# Patient Record
Sex: FEMALE | Race: BLACK OR AFRICAN AMERICAN | NOT HISPANIC OR LATINO | Employment: UNEMPLOYED | URBAN - METROPOLITAN AREA
[De-identification: names, ages, dates, MRNs, and addresses within clinical notes are randomized per-mention and may not be internally consistent; named-entity substitution may affect disease eponyms.]

---

## 2023-01-18 PROBLEM — H26.492 PCO (POSTERIOR CAPSULAR OPACIFICATION), LEFT: Status: ACTIVE | Noted: 2023-01-18

## 2023-01-18 PROBLEM — Z96.1 PSEUDOPHAKIA, RIGHT EYE: Status: ACTIVE | Noted: 2023-01-18

## 2023-01-18 PROBLEM — M79.641 PAIN IN BOTH HANDS: Status: ACTIVE | Noted: 2023-01-18

## 2023-01-18 PROBLEM — M19.90 ARTHRITIS: Status: ACTIVE | Noted: 2023-01-18

## 2023-01-18 PROBLEM — H26.9 CATARACT OF RIGHT EYE: Status: ACTIVE | Noted: 2023-01-18

## 2023-01-18 PROBLEM — K21.9 ESOPHAGEAL REFLUX: Status: ACTIVE | Noted: 2023-01-18

## 2023-01-18 PROBLEM — H25.811 COMBINED FORM OF AGE-RELATED CATARACT, RIGHT EYE: Status: ACTIVE | Noted: 2023-01-18

## 2023-01-18 PROBLEM — H26.40 AFTER CATARACT, LEFT EYE: Status: ACTIVE | Noted: 2023-01-18

## 2023-01-18 PROBLEM — R45.89 LONELINESS: Status: ACTIVE | Noted: 2023-01-18

## 2023-01-18 PROBLEM — Z74.09 DECREASED AMBULATION STATUS: Status: ACTIVE | Noted: 2023-01-18

## 2023-01-18 PROBLEM — G56.03 CARPAL TUNNEL SYNDROME, BILATERAL: Status: ACTIVE | Noted: 2023-01-18

## 2023-01-18 PROBLEM — Z78.0 ASYMPTOMATIC MENOPAUSE: Status: RESOLVED | Noted: 2023-01-18 | Resolved: 2023-01-18

## 2023-01-18 PROBLEM — N18.31 CKD STAGE G3A/A1, GFR 45-59 AND ALBUMIN CREATININE RATIO <30 MG/G (MULTI): Status: ACTIVE | Noted: 2023-01-18

## 2023-01-18 PROBLEM — G62.9 NEUROPATHY: Status: ACTIVE | Noted: 2023-01-18

## 2023-01-18 PROBLEM — E03.9 HYPOTHYROIDISM: Status: ACTIVE | Noted: 2023-01-18

## 2023-01-18 PROBLEM — R16.0 HEPATOMEGALY: Status: ACTIVE | Noted: 2023-01-18

## 2023-01-18 PROBLEM — R05.3 PERSISTENT COUGH: Status: ACTIVE | Noted: 2023-01-18

## 2023-01-18 PROBLEM — H52.4 MYOPIA WITH PRESBYOPIA: Status: ACTIVE | Noted: 2023-01-18

## 2023-01-18 PROBLEM — M79.642 PAIN IN BOTH HANDS: Status: ACTIVE | Noted: 2023-01-18

## 2023-01-18 PROBLEM — M79.606 LEG PAIN: Status: ACTIVE | Noted: 2023-01-18

## 2023-01-18 PROBLEM — Z72.0 CURRENT TOBACCO USE: Status: ACTIVE | Noted: 2023-01-18

## 2023-01-18 PROBLEM — R79.89 ELEVATED PARATHYROID HORMONE: Status: ACTIVE | Noted: 2023-01-18

## 2023-01-18 PROBLEM — I10 BENIGN ESSENTIAL HYPERTENSION: Status: ACTIVE | Noted: 2023-01-18

## 2023-01-18 PROBLEM — M54.17 LUMBOSACRAL NEURITIS: Status: ACTIVE | Noted: 2023-01-18

## 2023-01-18 PROBLEM — M54.9 BACK PAIN: Status: ACTIVE | Noted: 2023-01-18

## 2023-01-18 PROBLEM — H91.93 DECREASED HEARING OF BOTH EARS: Status: ACTIVE | Noted: 2023-01-18

## 2023-01-18 PROBLEM — M25.519 SHOULDER PAIN: Status: ACTIVE | Noted: 2023-01-18

## 2023-01-18 PROBLEM — H93.19 TINNITUS: Status: ACTIVE | Noted: 2023-01-18

## 2023-01-18 PROBLEM — Z96.1 PSEUDOPHAKIA, LEFT EYE: Status: ACTIVE | Noted: 2023-01-18

## 2023-01-18 PROBLEM — J31.0 CHRONIC RHINITIS: Status: ACTIVE | Noted: 2023-01-18

## 2023-01-18 PROBLEM — E06.3 HASHIMOTO'S THYROIDITIS: Status: ACTIVE | Noted: 2023-01-18

## 2023-01-18 PROBLEM — Z78.0 ASYMPTOMATIC MENOPAUSE: Status: ACTIVE | Noted: 2023-01-18

## 2023-01-18 PROBLEM — H52.209 ASTIGMATISM: Status: ACTIVE | Noted: 2023-01-18

## 2023-01-18 PROBLEM — M54.14 THORACIC NEURITIS: Status: ACTIVE | Noted: 2023-01-18

## 2023-01-18 PROBLEM — Z72.0 CURRENT TOBACCO USE: Status: RESOLVED | Noted: 2023-01-18 | Resolved: 2023-01-18

## 2023-01-18 PROBLEM — R45.89 LONELINESS: Status: RESOLVED | Noted: 2023-01-18 | Resolved: 2023-01-18

## 2023-01-18 PROBLEM — Z74.09 DECREASED AMBULATION STATUS: Status: RESOLVED | Noted: 2023-01-18 | Resolved: 2023-01-18

## 2023-01-18 PROBLEM — K52.9 GASTROENTERITIS: Status: ACTIVE | Noted: 2023-01-18

## 2023-01-18 PROBLEM — H52.10 MYOPIA WITH PRESBYOPIA: Status: ACTIVE | Noted: 2023-01-18

## 2023-01-18 PROBLEM — M17.9 OSTEOARTHRITIS OF KNEE: Status: ACTIVE | Noted: 2023-01-18

## 2023-01-18 PROBLEM — R79.89 RENAL AZOTEMIA: Status: ACTIVE | Noted: 2023-01-18

## 2023-01-18 PROBLEM — E55.9 VITAMIN D DEFICIENCY: Status: ACTIVE | Noted: 2023-01-18

## 2023-01-18 PROBLEM — E87.6 HYPOKALEMIA: Status: ACTIVE | Noted: 2023-01-18

## 2023-01-18 PROBLEM — M79.646 FINGER PAIN: Status: ACTIVE | Noted: 2023-01-18

## 2023-01-18 PROBLEM — Z91.81 AT MODERATE RISK FOR FALL: Status: RESOLVED | Noted: 2023-01-18 | Resolved: 2023-01-18

## 2023-01-18 PROBLEM — Z91.81 AT MODERATE RISK FOR FALL: Status: ACTIVE | Noted: 2023-01-18

## 2023-01-18 PROBLEM — H40.053 OCULAR HYPERTENSION, BILATERAL: Status: ACTIVE | Noted: 2023-01-18

## 2023-01-18 PROBLEM — K59.00 CONSTIPATION: Status: ACTIVE | Noted: 2023-01-18

## 2023-01-18 PROBLEM — M77.9 TENDONITIS: Status: ACTIVE | Noted: 2023-01-18

## 2023-01-18 PROBLEM — Z98.42 HISTORY OF YAG LASER CAPSULOTOMY OF LENS OF LEFT EYE: Status: ACTIVE | Noted: 2023-01-18

## 2023-01-18 PROBLEM — H91.90 HEARING LOSS: Status: ACTIVE | Noted: 2023-01-18

## 2023-01-18 PROBLEM — E78.00 HYPERCHOLESTEROLEMIA: Status: ACTIVE | Noted: 2023-01-18

## 2023-01-18 PROBLEM — D36.9 TUBULAR ADENOMA: Status: ACTIVE | Noted: 2023-01-18

## 2023-01-18 RX ORDER — LEVOTHYROXINE SODIUM 125 UG/1
TABLET ORAL DAILY
COMMUNITY
End: 2023-06-28 | Stop reason: SDUPTHER

## 2023-01-18 RX ORDER — FLUTICASONE PROPIONATE 50 MCG
1 SPRAY, SUSPENSION (ML) NASAL DAILY
COMMUNITY

## 2023-01-18 RX ORDER — ACETAMINOPHEN 325 MG/1
325-650 TABLET ORAL EVERY 4 HOURS PRN
COMMUNITY

## 2023-01-18 RX ORDER — AMLODIPINE BESYLATE 10 MG/1
10 TABLET ORAL DAILY
COMMUNITY
End: 2023-04-28 | Stop reason: SDUPTHER

## 2023-01-18 RX ORDER — MECLIZINE HYDROCHLORIDE 25 MG/1
25 TABLET ORAL 3 TIMES DAILY
COMMUNITY
End: 2024-02-28

## 2023-01-18 RX ORDER — BACLOFEN 10 MG/1
10 TABLET ORAL 3 TIMES DAILY PRN
COMMUNITY

## 2023-01-18 RX ORDER — HYDROCHLOROTHIAZIDE 25 MG/1
25 TABLET ORAL DAILY
COMMUNITY
End: 2023-04-28 | Stop reason: SDUPTHER

## 2023-01-18 RX ORDER — ACETAMINOPHEN 500 MG
1 TABLET ORAL DAILY
COMMUNITY

## 2023-01-18 RX ORDER — AMOXICILLIN 250 MG
1 CAPSULE ORAL DAILY PRN
COMMUNITY

## 2023-02-17 PROBLEM — E21.0 HYPERPARATHYROIDISM, PRIMARY (MULTI): Status: ACTIVE | Noted: 2023-02-17

## 2023-03-06 ENCOUNTER — OFFICE VISIT (OUTPATIENT)
Dept: PRIMARY CARE | Facility: CLINIC | Age: 89
End: 2023-03-06
Payer: MEDICARE

## 2023-03-06 DIAGNOSIS — Z00.00 ROUTINE GENERAL MEDICAL EXAMINATION AT HEALTH CARE FACILITY: ICD-10-CM

## 2023-03-06 DIAGNOSIS — E03.9 HYPOTHYROIDISM, UNSPECIFIED TYPE: ICD-10-CM

## 2023-03-06 DIAGNOSIS — E55.9 VITAMIN D DEFICIENCY: ICD-10-CM

## 2023-03-06 DIAGNOSIS — E21.0 HYPERPARATHYROIDISM, PRIMARY (MULTI): ICD-10-CM

## 2023-03-06 DIAGNOSIS — N18.31 CKD STAGE G3A/A1, GFR 45-59 AND ALBUMIN CREATININE RATIO <30 MG/G (MULTI): ICD-10-CM

## 2023-03-06 DIAGNOSIS — I10 BENIGN ESSENTIAL HYPERTENSION: Primary | ICD-10-CM

## 2023-03-06 DIAGNOSIS — Z00.00 MEDICARE ANNUAL WELLNESS VISIT, SUBSEQUENT: ICD-10-CM

## 2023-03-06 PROCEDURE — 4004F PT TOBACCO SCREEN RCVD TLK: CPT | Performed by: STUDENT IN AN ORGANIZED HEALTH CARE EDUCATION/TRAINING PROGRAM

## 2023-03-06 PROCEDURE — 1160F RVW MEDS BY RX/DR IN RCRD: CPT | Performed by: STUDENT IN AN ORGANIZED HEALTH CARE EDUCATION/TRAINING PROGRAM

## 2023-03-06 PROCEDURE — 3075F SYST BP GE 130 - 139MM HG: CPT | Performed by: STUDENT IN AN ORGANIZED HEALTH CARE EDUCATION/TRAINING PROGRAM

## 2023-03-06 PROCEDURE — G0439 PPPS, SUBSEQ VISIT: HCPCS | Performed by: STUDENT IN AN ORGANIZED HEALTH CARE EDUCATION/TRAINING PROGRAM

## 2023-03-06 PROCEDURE — 99214 OFFICE O/P EST MOD 30 MIN: CPT | Performed by: STUDENT IN AN ORGANIZED HEALTH CARE EDUCATION/TRAINING PROGRAM

## 2023-03-06 PROCEDURE — 1159F MED LIST DOCD IN RCRD: CPT | Performed by: STUDENT IN AN ORGANIZED HEALTH CARE EDUCATION/TRAINING PROGRAM

## 2023-03-06 PROCEDURE — 3078F DIAST BP <80 MM HG: CPT | Performed by: STUDENT IN AN ORGANIZED HEALTH CARE EDUCATION/TRAINING PROGRAM

## 2023-03-06 PROCEDURE — 1170F FXNL STATUS ASSESSED: CPT | Performed by: STUDENT IN AN ORGANIZED HEALTH CARE EDUCATION/TRAINING PROGRAM

## 2023-03-06 NOTE — PROGRESS NOTES
Subjective   Patient ID: Елена Perez is a 88 y.o. female who presents for Annual Exam (MCW. /77).    HPI     Review of Systems    Objective   Pulse 102   Wt 81.4 kg (179 lb 7.3 oz)   SpO2 95%   BMI 28.96 kg/m²     Physical Exam    Assessment/Plan

## 2023-03-07 VITALS
DIASTOLIC BLOOD PRESSURE: 77 MMHG | HEIGHT: 66 IN | WEIGHT: 179.45 LBS | OXYGEN SATURATION: 95 % | BODY MASS INDEX: 28.84 KG/M2 | SYSTOLIC BLOOD PRESSURE: 133 MMHG | HEART RATE: 102 BPM

## 2023-03-07 ASSESSMENT — PATIENT HEALTH QUESTIONNAIRE - PHQ9
2. FEELING DOWN, DEPRESSED OR HOPELESS: NOT AT ALL
SUM OF ALL RESPONSES TO PHQ9 QUESTIONS 1 AND 2: 0
1. LITTLE INTEREST OR PLEASURE IN DOING THINGS: NOT AT ALL

## 2023-03-07 ASSESSMENT — ACTIVITIES OF DAILY LIVING (ADL)
BATHING: INDEPENDENT
TOILETING: INDEPENDENT
FEEDING YOURSELF: INDEPENDENT
GROOMING: INDEPENDENT
HEARING - RIGHT EAR: HEARING AID
WALKS IN HOME: INDEPENDENT
HEARING - LEFT EAR: HEARING AID
PATIENT'S MEMORY ADEQUATE TO SAFELY COMPLETE DAILY ACTIVITIES?: YES
DRESSING YOURSELF: INDEPENDENT
ADEQUATE_TO_COMPLETE_ADL: YES

## 2023-03-07 NOTE — PROGRESS NOTES
Subjective   Reason for Visit: Елена Perez is an 88 y.o. female here for a Medicare Wellness visit.     Past Medical, Surgical, and Family History reviewed and updated in chart.    Reviewed all medications by prescribing practitioner or clinical pharmacist (such as prescriptions, OTCs, herbal therapies and supplements) and documented in the medical record.    HPI  87 y/o female with HTN, hypothyroidism ( ESt with endo ) , hyperparathyroidism  She is here with her son , Mr. Monteiro     No acute concerns today , but both son and pt were upset abt the MMSE and Mas personality .     Elevated PTH and vit D insuff noted in Dec 2022   Hypothyroidism : Est with endo   HTN  : at goal                         Patient Care Team:  Natty Gabriel MD as PCP - General     Review of Systems  Constitutional : No feeling poorly / fevers/ chills / night sweats/ fatigue   Cardiovascular : No CP /Palpitations/ lower extremity edema / syncope   Respiratory : No Cough /LUZ/Dyspnea at rest   Gastrointestinal : No abd pain / N/V  No bloody stools/ melena / constipation  Endo : No polyuria/polydipsia/ muscle weakness / sluggishness   CNS: No confusion / HA/ tingling/ numbness/ weakness of extremities  Psychiatric: No anxiety/ depression/ SI/HI    All other systems have been reviewed and are negative for complaint   Objective   Vitals:  Pulse 102   Wt 81.4 kg (179 lb 7.3 oz)   SpO2 95%   BMI 28.96 kg/m²       Physical Exam  Constitutional : Vitals reviewed. Alert and in no distress  Cardiovascular : RRR, Normal S1, S2, No pericardial rub/ gallop, no peripheral edema   Pulmonary: No respiratory distress, CTAB   MSK : Normal gait and station , strength and tone   Skin: Normal skin color and pigmentation, normal skin turgor and no rash   Neurologic : CNs 2-12 grossly intact , no obvious FNDs  Psych : A,Ox3, normal mood and affect    Assessment/Plan   Problem List Items Addressed This Visit    None  87 y/o female with HTN,  hypothyroidism ( ESt with endo ) , hyperparathyroidism  She is here with her son , Mr. Monteiro     Chronic medical conditions: Reviewed , assessed , stable.  - HTN : at goal   - Hyperparathyroidism :Rpt PTH, BMP, Vit D   Age appropriate labs / labs for mgmt of chronic medical conditions ordered, further mgmt pending the results.      Risk factors identified during visit :   None    Immunizations :  Influenza : Given today or previously   Prevnar 20 : Given today or previously   Pneumovax 23: Given today or previously    Shingles: recommended to receive at the pharmacy    Cancer screenings:   Mammogram :   Screening not indicated  Cervical cancer:  Screening not indicated  Colon cancer:     Screening  not indicated  Lung cancer :     Screening not indicated  HIV screening:   Screening not indicated  Osteoporosis :   Screening not indicated        RTO  in 6m or sooner if needed

## 2023-03-09 ASSESSMENT — ACTIVITIES OF DAILY LIVING (ADL)
DRESSING: INDEPENDENT
MANAGING_FINANCES: INDEPENDENT
BATHING: INDEPENDENT
GROCERY_SHOPPING: NEEDS ASSISTANCE
DOING_HOUSEWORK: NEEDS ASSISTANCE
TAKING_MEDICATION: INDEPENDENT

## 2023-03-09 ASSESSMENT — PATIENT HEALTH QUESTIONNAIRE - PHQ9
SUM OF ALL RESPONSES TO PHQ9 QUESTIONS 1 AND 2: 0
2. FEELING DOWN, DEPRESSED OR HOPELESS: NOT AT ALL
1. LITTLE INTEREST OR PLEASURE IN DOING THINGS: NOT AT ALL

## 2023-04-26 ENCOUNTER — TELEPHONE (OUTPATIENT)
Dept: PRIMARY CARE | Facility: CLINIC | Age: 89
End: 2023-04-26
Payer: COMMERCIAL

## 2023-04-26 NOTE — PROGRESS NOTES
"Patient Experience from recent annual visit with Dr. Gabriel on March 6, 2023.    Q 1. Easy get appt? ANSWER: yes  Q 2. Wait long to get appt? ANSWER: not long  Q 3. Referrals Easy? ANSWER: yes  Q 4. Dr know result of referral? ANSWER: probably yes  Q 5. Review Rxs? ANSWER: yes  Q 6. Dr review labs, xrays, etc.?  ANSWER: yes  Q 7. Insurance cover Rx? ANSWER: yes  Q 8. Dr ask about Balance? ANSWER: yes  Q 9.  Ask about bladder control? ANSWER: yes  Q 10.  Ask about exercise level? ANSWER: yes, she covered everything with me.    Other Comments:  \"very good doctor and like her very much.\"  "

## 2023-04-28 DIAGNOSIS — I10 BENIGN ESSENTIAL HYPERTENSION: Primary | ICD-10-CM

## 2023-05-01 RX ORDER — HYDROCHLOROTHIAZIDE 25 MG/1
25 TABLET ORAL DAILY
Qty: 90 TABLET | Refills: 1 | Status: SHIPPED | OUTPATIENT
Start: 2023-05-01 | End: 2023-11-17 | Stop reason: SDUPTHER

## 2023-05-01 RX ORDER — AMLODIPINE BESYLATE 10 MG/1
10 TABLET ORAL DAILY
Qty: 90 TABLET | Refills: 1 | Status: SHIPPED | OUTPATIENT
Start: 2023-05-01 | End: 2023-11-17 | Stop reason: SDUPTHER

## 2023-06-28 DIAGNOSIS — E03.9 HYPOTHYROIDISM, UNSPECIFIED TYPE: ICD-10-CM

## 2023-06-28 RX ORDER — LEVOTHYROXINE SODIUM 125 UG/1
TABLET ORAL
Qty: 52 TABLET | Refills: 3 | Status: SHIPPED | OUTPATIENT
Start: 2023-06-28

## 2023-09-07 ENCOUNTER — APPOINTMENT (OUTPATIENT)
Dept: PRIMARY CARE | Facility: CLINIC | Age: 89
End: 2023-09-07
Payer: COMMERCIAL

## 2023-09-08 ENCOUNTER — OFFICE VISIT (OUTPATIENT)
Dept: PRIMARY CARE | Facility: CLINIC | Age: 89
End: 2023-09-08
Payer: COMMERCIAL

## 2023-09-08 ENCOUNTER — LAB (OUTPATIENT)
Dept: LAB | Facility: LAB | Age: 89
End: 2023-09-08
Payer: COMMERCIAL

## 2023-09-08 VITALS
SYSTOLIC BLOOD PRESSURE: 131 MMHG | WEIGHT: 175.8 LBS | OXYGEN SATURATION: 96 % | HEART RATE: 93 BPM | HEIGHT: 66 IN | DIASTOLIC BLOOD PRESSURE: 70 MMHG | BODY MASS INDEX: 28.25 KG/M2

## 2023-09-08 DIAGNOSIS — H53.8 BLURRY VISION, LEFT EYE: ICD-10-CM

## 2023-09-08 DIAGNOSIS — Z23 NEED FOR INFLUENZA VACCINATION: ICD-10-CM

## 2023-09-08 DIAGNOSIS — E78.2 MIXED HYPERLIPIDEMIA: ICD-10-CM

## 2023-09-08 DIAGNOSIS — E21.0 HYPERPARATHYROIDISM, PRIMARY (MULTI): ICD-10-CM

## 2023-09-08 DIAGNOSIS — E03.9 HYPOTHYROIDISM, UNSPECIFIED TYPE: Primary | ICD-10-CM

## 2023-09-08 DIAGNOSIS — E03.9 HYPOTHYROIDISM, UNSPECIFIED TYPE: ICD-10-CM

## 2023-09-08 LAB
ANION GAP IN SER/PLAS: 13 MMOL/L (ref 10–20)
CALCIDIOL (25 OH VITAMIN D3) (NG/ML) IN SER/PLAS: 26 NG/ML
CALCIUM (MG/DL) IN SER/PLAS: 9.8 MG/DL (ref 8.6–10.6)
CARBON DIOXIDE, TOTAL (MMOL/L) IN SER/PLAS: 27 MMOL/L (ref 21–32)
CHLORIDE (MMOL/L) IN SER/PLAS: 104 MMOL/L (ref 98–107)
CHOLESTEROL (MG/DL) IN SER/PLAS: 203 MG/DL (ref 0–199)
CHOLESTEROL IN HDL (MG/DL) IN SER/PLAS: 35 MG/DL
CHOLESTEROL/HDL RATIO: 5.8
CREATININE (MG/DL) IN SER/PLAS: 1.08 MG/DL (ref 0.5–1.05)
GFR FEMALE: 49 ML/MIN/1.73M2
GLUCOSE (MG/DL) IN SER/PLAS: 113 MG/DL (ref 74–99)
LDL: 138 MG/DL (ref 0–99)
POTASSIUM (MMOL/L) IN SER/PLAS: 3.9 MMOL/L (ref 3.5–5.3)
SODIUM (MMOL/L) IN SER/PLAS: 140 MMOL/L (ref 136–145)
THYROTROPIN (MIU/L) IN SER/PLAS BY DETECTION LIMIT <= 0.05 MIU/L: 6.41 MIU/L (ref 0.44–3.98)
THYROXINE (T4) FREE (NG/DL) IN SER/PLAS: 1.14 NG/DL (ref 0.78–1.48)
TRIGLYCERIDE (MG/DL) IN SER/PLAS: 149 MG/DL (ref 0–149)
UREA NITROGEN (MG/DL) IN SER/PLAS: 18 MG/DL (ref 6–23)
VLDL: 30 MG/DL (ref 0–40)

## 2023-09-08 PROCEDURE — 82306 VITAMIN D 25 HYDROXY: CPT

## 2023-09-08 PROCEDURE — 1160F RVW MEDS BY RX/DR IN RCRD: CPT | Performed by: STUDENT IN AN ORGANIZED HEALTH CARE EDUCATION/TRAINING PROGRAM

## 2023-09-08 PROCEDURE — 3075F SYST BP GE 130 - 139MM HG: CPT | Performed by: STUDENT IN AN ORGANIZED HEALTH CARE EDUCATION/TRAINING PROGRAM

## 2023-09-08 PROCEDURE — 83970 ASSAY OF PARATHORMONE: CPT

## 2023-09-08 PROCEDURE — 84439 ASSAY OF FREE THYROXINE: CPT

## 2023-09-08 PROCEDURE — 84443 ASSAY THYROID STIM HORMONE: CPT

## 2023-09-08 PROCEDURE — 1159F MED LIST DOCD IN RCRD: CPT | Performed by: STUDENT IN AN ORGANIZED HEALTH CARE EDUCATION/TRAINING PROGRAM

## 2023-09-08 PROCEDURE — G0008 ADMIN INFLUENZA VIRUS VAC: HCPCS | Performed by: STUDENT IN AN ORGANIZED HEALTH CARE EDUCATION/TRAINING PROGRAM

## 2023-09-08 PROCEDURE — 99214 OFFICE O/P EST MOD 30 MIN: CPT | Performed by: STUDENT IN AN ORGANIZED HEALTH CARE EDUCATION/TRAINING PROGRAM

## 2023-09-08 PROCEDURE — 80061 LIPID PANEL: CPT

## 2023-09-08 PROCEDURE — 4004F PT TOBACCO SCREEN RCVD TLK: CPT | Performed by: STUDENT IN AN ORGANIZED HEALTH CARE EDUCATION/TRAINING PROGRAM

## 2023-09-08 PROCEDURE — 3078F DIAST BP <80 MM HG: CPT | Performed by: STUDENT IN AN ORGANIZED HEALTH CARE EDUCATION/TRAINING PROGRAM

## 2023-09-08 PROCEDURE — 90662 IIV NO PRSV INCREASED AG IM: CPT | Performed by: STUDENT IN AN ORGANIZED HEALTH CARE EDUCATION/TRAINING PROGRAM

## 2023-09-08 PROCEDURE — 80048 BASIC METABOLIC PNL TOTAL CA: CPT

## 2023-09-08 PROCEDURE — 36415 COLL VENOUS BLD VENIPUNCTURE: CPT

## 2023-09-08 NOTE — PROGRESS NOTES
Subjective   Patient ID: Елена Perez is a 88 y.o. female who presents for Follow-up (6 month follow-up. ).        HPI    89 y/o female with HTN, hypothyroidism ( ESt with endo ) , hyperparathyroidism   She is here with her son      No LMP recorded.     Review of Systems    Constitutional : No feeling poorly / fevers/ chills / night sweats/ fatigue   Cardiovascular : No CP /Palpitations/ lower extremity edema / syncope   Respiratory : No Cough /LUZ/Dyspnea at rest   Gastrointestinal : No abd pain / N/V  No bloody stools/ melena / constipation    CNS: No confusion / HA/ tingling/ numbness/ weakness of extremities  Psychiatric: No anxiety/ depression/ SI/HI    All other systems have been reviewed and are negative for complaint       Physical Exam    Constitutional : Vitals reviewed. Alert and in no distress  Cardiovascular : RRR, Normal S1, S2, No pericardial rub/ gallop, no peripheral edema   Pulmonary: No respiratory distress, CTAB     Neurologic : CNs 2-12 grossly intact , no obvious FNDs  Psych : A,Ox3, normal mood and affect      Assessment/Plan   Diagnoses and all orders for this visit:  Hypothyroidism, unspecified type  -     TSH with reflex to Free T4 if abnormal; Future  Need for influenza vaccination  -     Flu vaccine, quadrivalent, high-dose, preservative free, age 65y+ (FLUZONE)  Mixed hyperlipidemia  -     Lipid Panel; Future  Blurry vision, left eye  -     Referral to Ophthalmology; Future       89 y/o female with HTN, hypothyroidism ( ESt with endo ) , hyperparathyroidism  She is here with her son , Mr. Monteiro      Chronic medical conditions: Reviewed , assessed , stable.  - HTN : at goal   - Hyperparathyroidism :Rpt PTH, BMP, Vit D  to be done today  Age appropriate labs / labs for mgmt of chronic medical conditions ordered, further mgmt pending the results.    - HPL : Declined statin , dietary changes recommended     RTO  in 6m for MCW

## 2023-09-09 LAB — PARATHYRIN INTACT (PG/ML) IN SER/PLAS: 89.3 PG/ML (ref 18.5–88)

## 2023-11-17 DIAGNOSIS — I10 BENIGN ESSENTIAL HYPERTENSION: ICD-10-CM

## 2023-11-20 RX ORDER — AMLODIPINE BESYLATE 10 MG/1
10 TABLET ORAL DAILY
Qty: 90 TABLET | Refills: 1 | Status: SHIPPED | OUTPATIENT
Start: 2023-11-20 | End: 2024-05-17 | Stop reason: SDUPTHER

## 2023-11-20 RX ORDER — HYDROCHLOROTHIAZIDE 25 MG/1
25 TABLET ORAL DAILY
Qty: 90 TABLET | Refills: 1 | Status: SHIPPED | OUTPATIENT
Start: 2023-11-20 | End: 2024-05-17 | Stop reason: SDUPTHER

## 2024-02-28 ENCOUNTER — HOSPITAL ENCOUNTER (OUTPATIENT)
Dept: RADIOLOGY | Facility: CLINIC | Age: 89
Discharge: HOME | End: 2024-02-28
Payer: COMMERCIAL

## 2024-02-28 ENCOUNTER — OFFICE VISIT (OUTPATIENT)
Dept: PRIMARY CARE | Facility: CLINIC | Age: 89
End: 2024-02-28
Payer: COMMERCIAL

## 2024-02-28 VITALS
HEART RATE: 96 BPM | OXYGEN SATURATION: 94 % | DIASTOLIC BLOOD PRESSURE: 70 MMHG | SYSTOLIC BLOOD PRESSURE: 110 MMHG | BODY MASS INDEX: 28.48 KG/M2 | HEIGHT: 66 IN | WEIGHT: 177.2 LBS

## 2024-02-28 DIAGNOSIS — M47.892 OTHER OSTEOARTHRITIS OF SPINE, CERVICAL REGION: Primary | ICD-10-CM

## 2024-02-28 DIAGNOSIS — M47.892 OTHER OSTEOARTHRITIS OF SPINE, CERVICAL REGION: ICD-10-CM

## 2024-02-28 DIAGNOSIS — N18.31 CKD STAGE G3A/A1, GFR 45-59 AND ALBUMIN CREATININE RATIO <30 MG/G (MULTI): ICD-10-CM

## 2024-02-28 DIAGNOSIS — E21.0 HYPERPARATHYROIDISM, PRIMARY (MULTI): ICD-10-CM

## 2024-02-28 PROCEDURE — 72040 X-RAY EXAM NECK SPINE 2-3 VW: CPT

## 2024-02-28 PROCEDURE — 1159F MED LIST DOCD IN RCRD: CPT | Performed by: STUDENT IN AN ORGANIZED HEALTH CARE EDUCATION/TRAINING PROGRAM

## 2024-02-28 PROCEDURE — 3078F DIAST BP <80 MM HG: CPT | Performed by: STUDENT IN AN ORGANIZED HEALTH CARE EDUCATION/TRAINING PROGRAM

## 2024-02-28 PROCEDURE — 3074F SYST BP LT 130 MM HG: CPT | Performed by: STUDENT IN AN ORGANIZED HEALTH CARE EDUCATION/TRAINING PROGRAM

## 2024-02-28 PROCEDURE — 99214 OFFICE O/P EST MOD 30 MIN: CPT | Performed by: STUDENT IN AN ORGANIZED HEALTH CARE EDUCATION/TRAINING PROGRAM

## 2024-02-28 PROCEDURE — 1160F RVW MEDS BY RX/DR IN RCRD: CPT | Performed by: STUDENT IN AN ORGANIZED HEALTH CARE EDUCATION/TRAINING PROGRAM

## 2024-02-28 NOTE — PROGRESS NOTES
"Subjective   Patient ID: Елена Perez is a 89 y.o. female who presents for Follow-up ( follow-up neck sprain. ).        HPI    Neck muscle spasm and pain after trying to lift a chair     UC visit thereafter , was given prednisone and muscle relaxer.  \"Improved greatly \"   Visit Vitals  /70   Pulse 96   Ht 1.676 m (5' 6\")   Wt 80.4 kg (177 lb 3.2 oz)   SpO2 94%   BMI 28.60 kg/m²   Smoking Status Every Day   BSA 1.93 m²      No LMP recorded.     Review of Systems    Constitutional : No feeling poorly / fevers/ chills / night sweats/ fatigue   Cardiovascular : No CP /Palpitations/ lower extremity edema / syncope   Respiratory : No Cough /LUZ/Dyspnea at rest   Gastrointestinal : No abd pain / N/V  No bloody stools/ melena / constipation  Endo : No polyuria/polydipsia/ muscle weakness / sluggishness   CNS: No confusion / HA/ tingling/ numbness/ weakness of extremities  Psychiatric: No anxiety/ depression/ SI/HI    All other systems have been reviewed and are negative for complaint       Physical Exam    Constitutional : Vitals reviewed. Alert and in no distress  Cardiovascular : RRR, Normal S1, S2, No pericardial rub/ gallop, no peripheral edema   Pulmonary: No respiratory distress, CTAB   MSK : Normal gait and station , strength and tone   TTP at the left lateral side of the neck    Neurologic : CNs 2-12 grossly intact , no obvious FNDs  Psych : A,Ox3, normal mood and affect      Assessment/Plan   Diagnoses and all orders for this visit:  Other osteoarthritis of spine, cervical region  -     XR cervical spine 2-3 views; Future  Hyperparathyroidism, primary (CMS/HCC)  CKD stage G3a/A1, GFR 45-59 and albumin creatinine ratio <30 mg/g (CMS/HCC)        Possible neck muscle spasm after moving heavy recliner  Will get Xray C spine to r/o fracture given known DJD C spine and her age   Try Voltaren gel , salon patches for pain relief     Conditions addressed and mgmt as noted above.  Pertinent labs, images/ imaging " reports , chart review was done .

## 2024-05-17 DIAGNOSIS — I10 BENIGN ESSENTIAL HYPERTENSION: ICD-10-CM

## 2024-05-20 RX ORDER — HYDROCHLOROTHIAZIDE 25 MG/1
25 TABLET ORAL DAILY
Qty: 30 TABLET | Refills: 0 | Status: SHIPPED | OUTPATIENT
Start: 2024-05-20

## 2024-05-20 RX ORDER — AMLODIPINE BESYLATE 10 MG/1
10 TABLET ORAL DAILY
Qty: 30 TABLET | Refills: 0 | Status: SHIPPED | OUTPATIENT
Start: 2024-05-20

## 2024-07-09 DIAGNOSIS — E03.9 HYPOTHYROIDISM, UNSPECIFIED TYPE: ICD-10-CM

## 2024-07-09 DIAGNOSIS — I10 BENIGN ESSENTIAL HYPERTENSION: ICD-10-CM

## 2024-07-09 RX ORDER — LEVOTHYROXINE SODIUM 125 UG/1
TABLET ORAL
Qty: 52 TABLET | Refills: 3 | Status: SHIPPED | OUTPATIENT
Start: 2024-07-09

## 2024-07-09 RX ORDER — HYDROCHLOROTHIAZIDE 25 MG/1
25 TABLET ORAL DAILY
Qty: 90 TABLET | Refills: 1 | Status: SHIPPED | OUTPATIENT
Start: 2024-07-09

## 2024-07-09 RX ORDER — AMLODIPINE BESYLATE 10 MG/1
10 TABLET ORAL DAILY
Qty: 90 TABLET | Refills: 1 | Status: SHIPPED | OUTPATIENT
Start: 2024-07-09

## 2024-07-23 ENCOUNTER — APPOINTMENT (OUTPATIENT)
Dept: PRIMARY CARE | Facility: CLINIC | Age: 89
End: 2024-07-23
Payer: COMMERCIAL

## 2024-07-23 VITALS
HEIGHT: 66 IN | HEART RATE: 87 BPM | BODY MASS INDEX: 28.12 KG/M2 | OXYGEN SATURATION: 96 % | WEIGHT: 175 LBS | DIASTOLIC BLOOD PRESSURE: 75 MMHG | SYSTOLIC BLOOD PRESSURE: 126 MMHG

## 2024-07-23 DIAGNOSIS — M47.816 OSTEOARTHRITIS OF LUMBAR SPINE, UNSPECIFIED SPINAL OSTEOARTHRITIS COMPLICATION STATUS: ICD-10-CM

## 2024-07-23 DIAGNOSIS — H53.9 VISION CHANGES: ICD-10-CM

## 2024-07-23 DIAGNOSIS — E03.9 ACQUIRED HYPOTHYROIDISM: ICD-10-CM

## 2024-07-23 DIAGNOSIS — Z00.00 MEDICARE ANNUAL WELLNESS VISIT, SUBSEQUENT: Primary | ICD-10-CM

## 2024-07-23 DIAGNOSIS — E55.9 VITAMIN D DEFICIENCY: ICD-10-CM

## 2024-07-23 DIAGNOSIS — E21.0 HYPERPARATHYROIDISM, PRIMARY (MULTI): ICD-10-CM

## 2024-07-23 DIAGNOSIS — N18.31 CKD STAGE G3A/A1, GFR 45-59 AND ALBUMIN CREATININE RATIO <30 MG/G (MULTI): ICD-10-CM

## 2024-07-23 PROCEDURE — 1170F FXNL STATUS ASSESSED: CPT | Performed by: STUDENT IN AN ORGANIZED HEALTH CARE EDUCATION/TRAINING PROGRAM

## 2024-07-23 PROCEDURE — 4004F PT TOBACCO SCREEN RCVD TLK: CPT | Performed by: STUDENT IN AN ORGANIZED HEALTH CARE EDUCATION/TRAINING PROGRAM

## 2024-07-23 PROCEDURE — G0439 PPPS, SUBSEQ VISIT: HCPCS | Performed by: STUDENT IN AN ORGANIZED HEALTH CARE EDUCATION/TRAINING PROGRAM

## 2024-07-23 PROCEDURE — 99214 OFFICE O/P EST MOD 30 MIN: CPT | Performed by: STUDENT IN AN ORGANIZED HEALTH CARE EDUCATION/TRAINING PROGRAM

## 2024-07-23 PROCEDURE — 1124F ACP DISCUSS-NO DSCNMKR DOCD: CPT | Performed by: STUDENT IN AN ORGANIZED HEALTH CARE EDUCATION/TRAINING PROGRAM

## 2024-07-23 PROCEDURE — 1160F RVW MEDS BY RX/DR IN RCRD: CPT | Performed by: STUDENT IN AN ORGANIZED HEALTH CARE EDUCATION/TRAINING PROGRAM

## 2024-07-23 PROCEDURE — 3074F SYST BP LT 130 MM HG: CPT | Performed by: STUDENT IN AN ORGANIZED HEALTH CARE EDUCATION/TRAINING PROGRAM

## 2024-07-23 PROCEDURE — 1159F MED LIST DOCD IN RCRD: CPT | Performed by: STUDENT IN AN ORGANIZED HEALTH CARE EDUCATION/TRAINING PROGRAM

## 2024-07-23 PROCEDURE — 3078F DIAST BP <80 MM HG: CPT | Performed by: STUDENT IN AN ORGANIZED HEALTH CARE EDUCATION/TRAINING PROGRAM

## 2024-07-23 RX ORDER — CYCLOBENZAPRINE HCL 5 MG
5 TABLET ORAL NIGHTLY PRN
Qty: 30 TABLET | Refills: 2 | Status: SHIPPED | OUTPATIENT
Start: 2024-07-23 | End: 2024-09-21

## 2024-07-23 RX ORDER — CYCLOBENZAPRINE HCL 5 MG
TABLET ORAL
COMMUNITY
Start: 2024-02-20

## 2024-07-23 ASSESSMENT — ACTIVITIES OF DAILY LIVING (ADL)
DRESSING: INDEPENDENT
BATHING: INDEPENDENT
TAKING_MEDICATION: INDEPENDENT
GROCERY_SHOPPING: TOTAL CARE
DOING_HOUSEWORK: NEEDS ASSISTANCE
MANAGING_FINANCES: INDEPENDENT

## 2024-07-23 ASSESSMENT — PATIENT HEALTH QUESTIONNAIRE - PHQ9
2. FEELING DOWN, DEPRESSED OR HOPELESS: NOT AT ALL
1. LITTLE INTEREST OR PLEASURE IN DOING THINGS: NOT AT ALL
SUM OF ALL RESPONSES TO PHQ9 QUESTIONS 1 AND 2: 0

## 2024-07-23 NOTE — PROGRESS NOTES
Subjective   Reason for Visit: Елена Perez is an 89 y.o. female here for a Medicare Wellness visit.     Past Medical, Surgical, and Family History reviewed and updated in chart.    Reviewed all medications by prescribing practitioner or clinical pharmacist (such as prescriptions, OTCs, herbal therapies and supplements) and documented in the medical record.    HPI  89 y/o female with HTN, hypothyroidism ( ESt with endo ) , hyperparathyroidism   She is here with her son                Patient Care Team:  Natty Gabriel MD as PCP - General (Family Medicine)     Current Outpatient Medications   Medication Instructions    acetaminophen (TYLENOL) 325-650 mg, oral, Every 4 hours PRN    amLODIPine (NORVASC) 10 mg, oral, Daily    cholecalciferol (Vitamin D-3) 5,000 Units tablet 1 tablet, oral, Daily    cyclobenzaprine (Flexeril) 5 mg tablet TAKE 1 TABLET BY MOUTH EVERY 8 HOURS FOR 10 DAYS AS NEEDED    cyclobenzaprine (FLEXERIL) 5 mg, oral, Nightly PRN    fluticasone (Flonase) 50 mcg/actuation nasal spray 1 spray, Each Nostril, Daily, Shake gently. Before first use, prime pump. After use, clean tip and replace cap.    hydroCHLOROthiazide (HYDRODIURIL) 25 mg, oral, Daily    levothyroxine (Synthroid, Levoxyl) 125 mcg tablet Take 0.5 tablet by mouth every day x6 days, and 1 full tablet on day7    sennosides-docusate sodium (Lindsey-Colace) 8.6-50 mg tablet 1 tablet, oral, Daily PRN        Review of Systems  Constitutional: no chills, no fever and no night sweats.     Eyes: no blurred vision and no eyesight problems.     ENT: no hearing loss, no nasal congestion, no nasal discharge, no hoarseness and no sore throat.     Cardiovascular: no chest pain, no intermittent leg claudication, no lower extremity edema, no palpitations and no syncope.     Respiratory: no cough, no shortness of breath during exertion, no shortness of breath at rest and no wheezing.     Gastrointestinal: no abdominal pain, no blood in stools, no  "constipation, no diarrhea, no melena, no nausea, no rectal pain and no vomiting.     Genitourinary: no dysuria, no change in urinary frequency, no urinary hesitancy, no feelings of urinary urgency and no vaginal discharge.     Musculoskeletal: no arthralgias, no back pain and no myalgias.     Integumentary: no new skin lesions and no rashes.     Neurological: no difficulty walking, no headache, no limb weakness, no numbness and no tingling.     Psychiatric: no anxiety, no depression, no anhedonia and no substance use disorders.     Endocrine: no recent weight gain and no recent weight loss.     Hematologic/Lymphatic: no tendency for easy bruising and no swollen glands.          All other systems have been reviewed and are negative for complaint.    Objective   Vitals:  /75   Pulse 87   Ht 1.676 m (5' 6\")   Wt 79.4 kg (175 lb)   SpO2 96%   BMI 28.25 kg/m²       Physical Exam    Constitutional: Alert and in no acute distress. Well developed, well nourished.     Eyes: Normal external exam. Pupils were equal in size, round, reactive to light (PERRL) with normal accommodation and extraocular movements intact (EOMI).     Ears, Nose, Mouth, and Throat: External inspection of ears and nose: Normal.  Otoscopic examination: Normal.      Neck: No neck mass was observed. Supple.     Cardiovascular: Heart rate and rhythm were normal, normal S1 and S2, no gallops, no murmurs and no pericardial rub    Pulmonary: No respiratory distress. Clear bilateral breath sounds.     Abdomen: Soft nontender; no abdominal mass palpated. No organomegaly.     Musculoskeletal: No joint swelling seen, normal movements of all extremities. Range of motion: Normal.  Muscle strength/tone: Normal.        Neurologic: Deep tendon reflexes were 2+ and symmetric. Sensation: Normal.     Psychiatric: Judgment and insight: Intact. Mood and affect: Normal.    Assessment/Plan   Problem List Items Addressed This Visit       CKD stage G3a/A1, GFR 45-59 " and albumin creatinine ratio <30 mg/g (Multi)    Relevant Orders    CBC    Comprehensive Metabolic Panel    Hypothyroidism    Relevant Orders    TSH with reflex to Free T4 if abnormal    Lipid Panel    Vitamin D deficiency    Relevant Orders    Vitamin D 25-Hydroxy,Total (for eval of Vitamin D levels)    Hyperparathyroidism, primary (Multi)    Relevant Orders    Parathyroid Hormone, Intact     Other Visit Diagnoses       Medicare annual wellness visit, subsequent    -  Primary    Relevant Orders    Hemoglobin A1C    Vision changes        Relevant Orders    Referral to Ophthalmology    Osteoarthritis of lumbar spine, unspecified spinal osteoarthritis complication status        Relevant Medications    cyclobenzaprine (Flexeril) 5 mg tablet          89 y/o female with HTN, hypothyroidism ( ESt with endo ) , hyperparathyroidism  She is here with her son , Mr. Monteiro      Chronic medical conditions: Reviewed , assessed , stable.  - HTN : at goal   - Hyperparathyroidism :Rpt PTH, BMP, Vit D   Age appropriate labs / labs for mgmt of chronic medical conditions ordered, further mgmt pending the results.    - hypothyroidism : due for labs   - Morning stiffness frrom DJD : Flexeril prn   Drowsiness as SE discussed      MMSE : 26/30 today  Immunizations :  Influenza :  in the fall   Prevnar 20 : Given . previously   Pneumovax 23: Given .previously    Shingles: recommended to receive at the pharmacy, pt declined      Cancer screenings:   Mammogram :   Screening not indicated  Cervical cancer:  Screening not indicated  Colon cancer:     Screening  not indicated  Lung cancer :     Screening not indicated  HIV screening:   Screening not indicated  Osteoporosis :   Screening not indicated           RTO  in 6m or sooner if needed

## 2024-07-24 ENCOUNTER — LAB (OUTPATIENT)
Dept: LAB | Facility: LAB | Age: 89
End: 2024-07-24
Payer: COMMERCIAL

## 2024-07-24 DIAGNOSIS — Z00.00 MEDICARE ANNUAL WELLNESS VISIT, SUBSEQUENT: ICD-10-CM

## 2024-07-24 DIAGNOSIS — E03.9 ACQUIRED HYPOTHYROIDISM: ICD-10-CM

## 2024-07-24 DIAGNOSIS — E21.0 HYPERPARATHYROIDISM, PRIMARY (MULTI): ICD-10-CM

## 2024-07-24 DIAGNOSIS — N18.31 CKD STAGE G3A/A1, GFR 45-59 AND ALBUMIN CREATININE RATIO <30 MG/G (MULTI): ICD-10-CM

## 2024-07-24 DIAGNOSIS — E55.9 VITAMIN D DEFICIENCY: ICD-10-CM

## 2024-07-24 LAB
25(OH)D3 SERPL-MCNC: 29 NG/ML (ref 30–100)
ALBUMIN SERPL BCP-MCNC: 4.1 G/DL (ref 3.4–5)
ALP SERPL-CCNC: 69 U/L (ref 33–136)
ALT SERPL W P-5'-P-CCNC: 9 U/L (ref 7–45)
ANION GAP SERPL CALC-SCNC: 13 MMOL/L (ref 10–20)
AST SERPL W P-5'-P-CCNC: 11 U/L (ref 9–39)
BILIRUB SERPL-MCNC: 0.5 MG/DL (ref 0–1.2)
BUN SERPL-MCNC: 15 MG/DL (ref 6–23)
CALCIUM SERPL-MCNC: 9.8 MG/DL (ref 8.6–10.6)
CHLORIDE SERPL-SCNC: 98 MMOL/L (ref 98–107)
CHOLEST SERPL-MCNC: 206 MG/DL (ref 0–199)
CHOLESTEROL/HDL RATIO: 5.2
CO2 SERPL-SCNC: 27 MMOL/L (ref 21–32)
CREAT SERPL-MCNC: 0.96 MG/DL (ref 0.5–1.05)
EGFRCR SERPLBLD CKD-EPI 2021: 57 ML/MIN/1.73M*2
ERYTHROCYTE [DISTWIDTH] IN BLOOD BY AUTOMATED COUNT: 17.1 % (ref 11.5–14.5)
EST. AVERAGE GLUCOSE BLD GHB EST-MCNC: 146 MG/DL
GLUCOSE SERPL-MCNC: 114 MG/DL (ref 74–99)
HBA1C MFR BLD: 6.7 %
HCT VFR BLD AUTO: 63.2 % (ref 36–46)
HDLC SERPL-MCNC: 39.4 MG/DL
HGB BLD-MCNC: 20.7 G/DL (ref 12–16)
LDLC SERPL CALC-MCNC: 144 MG/DL
MCH RBC QN AUTO: 28.2 PG (ref 26–34)
MCHC RBC AUTO-ENTMCNC: 32.8 G/DL (ref 32–36)
MCV RBC AUTO: 86 FL (ref 80–100)
NON HDL CHOLESTEROL: 167 MG/DL (ref 0–149)
NRBC BLD-RTO: 0 /100 WBCS (ref 0–0)
PLATELET # BLD AUTO: 143 X10*3/UL (ref 150–450)
POTASSIUM SERPL-SCNC: 3.4 MMOL/L (ref 3.5–5.3)
PROT SERPL-MCNC: 7.6 G/DL (ref 6.4–8.2)
PTH-INTACT SERPL-MCNC: 82.1 PG/ML (ref 18.5–88)
RBC # BLD AUTO: 7.34 X10*6/UL (ref 4–5.2)
SODIUM SERPL-SCNC: 135 MMOL/L (ref 136–145)
T4 FREE SERPL-MCNC: 1.42 NG/DL (ref 0.78–1.48)
TRIGL SERPL-MCNC: 113 MG/DL (ref 0–149)
TSH SERPL-ACNC: 4.1 MIU/L (ref 0.44–3.98)
VLDL: 23 MG/DL (ref 0–40)
WBC # BLD AUTO: 6.8 X10*3/UL (ref 4.4–11.3)

## 2024-07-24 PROCEDURE — 84443 ASSAY THYROID STIM HORMONE: CPT

## 2024-07-24 PROCEDURE — 83036 HEMOGLOBIN GLYCOSYLATED A1C: CPT

## 2024-07-24 PROCEDURE — 82306 VITAMIN D 25 HYDROXY: CPT

## 2024-07-24 PROCEDURE — 84439 ASSAY OF FREE THYROXINE: CPT

## 2024-07-24 PROCEDURE — 80061 LIPID PANEL: CPT

## 2024-07-24 PROCEDURE — 80053 COMPREHEN METABOLIC PANEL: CPT

## 2024-07-24 PROCEDURE — 85027 COMPLETE CBC AUTOMATED: CPT

## 2024-07-24 PROCEDURE — 83970 ASSAY OF PARATHORMONE: CPT

## 2024-08-07 DIAGNOSIS — D58.2 ELEVATED HEMOGLOBIN (CMS-HCC): Primary | ICD-10-CM

## 2025-01-02 DIAGNOSIS — I10 BENIGN ESSENTIAL HYPERTENSION: ICD-10-CM

## 2025-01-07 ENCOUNTER — APPOINTMENT (OUTPATIENT)
Dept: OPHTHALMOLOGY | Facility: CLINIC | Age: OVER 89
End: 2025-01-07
Payer: COMMERCIAL

## 2025-01-07 DIAGNOSIS — H53.9 VISION CHANGES: ICD-10-CM

## 2025-01-07 DIAGNOSIS — Z96.1 PSEUDOPHAKIA, LEFT EYE: ICD-10-CM

## 2025-01-07 DIAGNOSIS — H34.8110 CENTRAL RETINAL VEIN OCCLUSION WITH MACULAR EDEMA OF RIGHT EYE: Primary | ICD-10-CM

## 2025-01-07 DIAGNOSIS — H26.491 PCO (POSTERIOR CAPSULAR OPACIFICATION), RIGHT: ICD-10-CM

## 2025-01-07 DIAGNOSIS — Z98.42 HISTORY OF YAG LASER CAPSULOTOMY OF LENS OF LEFT EYE: ICD-10-CM

## 2025-01-07 DIAGNOSIS — H40.053 OCULAR HYPERTENSION, BILATERAL: ICD-10-CM

## 2025-01-07 DIAGNOSIS — Z96.1 PSEUDOPHAKIA, RIGHT EYE: ICD-10-CM

## 2025-01-07 PROBLEM — H26.40 AFTER CATARACT, LEFT EYE: Status: RESOLVED | Noted: 2023-01-18 | Resolved: 2025-01-07

## 2025-01-07 PROBLEM — H25.811 COMBINED FORM OF AGE-RELATED CATARACT, RIGHT EYE: Status: RESOLVED | Noted: 2023-01-18 | Resolved: 2025-01-07

## 2025-01-07 PROCEDURE — 92134 CPTRZ OPH DX IMG PST SGM RTA: CPT | Performed by: STUDENT IN AN ORGANIZED HEALTH CARE EDUCATION/TRAINING PROGRAM

## 2025-01-07 PROCEDURE — 92004 COMPRE OPH EXAM NEW PT 1/>: CPT | Performed by: STUDENT IN AN ORGANIZED HEALTH CARE EDUCATION/TRAINING PROGRAM

## 2025-01-07 RX ORDER — HYDROCHLOROTHIAZIDE 25 MG/1
25 TABLET ORAL DAILY
Qty: 90 TABLET | Refills: 3 | Status: SHIPPED | OUTPATIENT
Start: 2025-01-07

## 2025-01-07 RX ORDER — AMLODIPINE BESYLATE 10 MG/1
10 TABLET ORAL DAILY
Qty: 90 TABLET | Refills: 3 | Status: SHIPPED | OUTPATIENT
Start: 2025-01-07

## 2025-01-07 ASSESSMENT — ENCOUNTER SYMPTOMS
RESPIRATORY NEGATIVE: 0
ENDOCRINE NEGATIVE: 0
PSYCHIATRIC NEGATIVE: 0
MUSCULOSKELETAL NEGATIVE: 0
CARDIOVASCULAR NEGATIVE: 0
EYES NEGATIVE: 1
GASTROINTESTINAL NEGATIVE: 0
HEMATOLOGIC/LYMPHATIC NEGATIVE: 0
ALLERGIC/IMMUNOLOGIC NEGATIVE: 0
NEUROLOGICAL NEGATIVE: 0
CONSTITUTIONAL NEGATIVE: 0

## 2025-01-07 ASSESSMENT — VISUAL ACUITY
METHOD: SNELLEN - LINEAR
OS_SC: 20/30
OD_SC: 20/400

## 2025-01-07 ASSESSMENT — SLIT LAMP EXAM - LIDS
COMMENTS: DERMATOCHALASIS, MGD
COMMENTS: DERMATOCHALASIS, MGD

## 2025-01-07 ASSESSMENT — REFRACTION_MANIFEST
OD_ADD: +2.50
OD_AXIS: 024
OS_CYLINDER: +0.50
OS_SPHERE: -0.75
OS_ADD: +2.50
OS_AXIS: 111
OD_SPHERE: -1.50
OD_CYLINDER: +1.00

## 2025-01-07 ASSESSMENT — EXTERNAL EXAM - LEFT EYE: OS_EXAM: NORMAL

## 2025-01-07 ASSESSMENT — CUP TO DISC RATIO
OD_RATIO: .3
OS_RATIO: .3

## 2025-01-07 ASSESSMENT — CONF VISUAL FIELD
OS_NORMAL: 1
OS_SUPERIOR_TEMPORAL_RESTRICTION: 0
OS_INFERIOR_NASAL_RESTRICTION: 0
OD_SUPERIOR_NASAL_RESTRICTION: 0
OD_NORMAL: 1
OD_INFERIOR_NASAL_RESTRICTION: 0
OS_SUPERIOR_NASAL_RESTRICTION: 0
OS_INFERIOR_TEMPORAL_RESTRICTION: 0
OD_SUPERIOR_TEMPORAL_RESTRICTION: 0
OD_INFERIOR_TEMPORAL_RESTRICTION: 0

## 2025-01-07 ASSESSMENT — TONOMETRY
OS_IOP_MMHG: 20
OD_IOP_MMHG: 19
IOP_METHOD: GOLDMANN APPLANATION

## 2025-01-07 ASSESSMENT — EXTERNAL EXAM - RIGHT EYE: OD_EXAM: NORMAL

## 2025-01-07 NOTE — PROGRESS NOTES
Assessment/Plan   Diagnoses and all orders for this visit:  Central retinal vein occlusion with macular edema of right eye  89 YO F presents with complaint of right eye painless vision loss for the past 6 months. Presented to [facility] ED 11/2024 for evaluation but did not see ophthalmology at that time.   (+)med hx of CKD and Benign Essential Hypertension  On DFE and MAC OCT extensive macular edema with hemorrhages noted right eye  Suspected etiology of edema is a CRVO  No signs of neovascularization noted on exam  Pt ed on findings. Referral to retina for eval and treatment 1-3 weeks  Advised f/u with PCP for vascular risk factor workup    History of YAG laser capsulotomy of lens of left eye  Pseudophakia, left eye  Pseudophakia, right eye  PCO (posterior capsular opacification), right  OD 6/2019-[facility]sie  OS approximately 2014, s/p YAG 3/2019  On exam today demonstrating signs of PCO right eye making views more difficulty-monitor at this time while being evaluated and treated for posterior segment findings    Ocular Hypertension Bilateral  (-)FHX of glaucoma  TMAX 22/22  IOP today 19/20  OCT RNFL today OD: unreliable OS: all quad wnl  Last HVF 24-2 2019 unreliable  With stable ONH appearance and OCT continue to monitor     Retina for eval and treatment 1-3 weeks

## 2025-01-24 ENCOUNTER — APPOINTMENT (OUTPATIENT)
Dept: PRIMARY CARE | Facility: CLINIC | Age: OVER 89
End: 2025-01-24
Payer: COMMERCIAL

## 2025-01-24 VITALS
BODY MASS INDEX: 27.87 KG/M2 | HEART RATE: 97 BPM | OXYGEN SATURATION: 95 % | WEIGHT: 173.4 LBS | HEIGHT: 66 IN | SYSTOLIC BLOOD PRESSURE: 134 MMHG | DIASTOLIC BLOOD PRESSURE: 76 MMHG

## 2025-01-24 DIAGNOSIS — E03.9 HYPOTHYROIDISM, UNSPECIFIED TYPE: ICD-10-CM

## 2025-01-24 DIAGNOSIS — N18.31 CKD STAGE G3A/A1, GFR 45-59 AND ALBUMIN CREATININE RATIO <30 MG/G (MULTI): ICD-10-CM

## 2025-01-24 DIAGNOSIS — E78.2 MIXED HYPERLIPIDEMIA: ICD-10-CM

## 2025-01-24 DIAGNOSIS — Z23 NEED FOR INFLUENZA VACCINATION: ICD-10-CM

## 2025-01-24 DIAGNOSIS — I10 PRIMARY HYPERTENSION: Primary | ICD-10-CM

## 2025-01-24 DIAGNOSIS — E55.9 VITAMIN D INSUFFICIENCY: ICD-10-CM

## 2025-01-24 DIAGNOSIS — R73.02 IGT (IMPAIRED GLUCOSE TOLERANCE): ICD-10-CM

## 2025-01-24 DIAGNOSIS — E11.9 DIABETES MELLITUS WITHOUT COMPLICATION (MULTI): ICD-10-CM

## 2025-01-24 DIAGNOSIS — I48.91 NEW ONSET A-FIB (MULTI): ICD-10-CM

## 2025-01-24 DIAGNOSIS — I48.91 ATRIAL FIBRILLATION, UNSPECIFIED TYPE (MULTI): ICD-10-CM

## 2025-01-24 DIAGNOSIS — R79.89 ABNORMAL TSH: ICD-10-CM

## 2025-01-24 DIAGNOSIS — M19.91 PRIMARY OSTEOARTHRITIS, UNSPECIFIED SITE: ICD-10-CM

## 2025-01-24 DIAGNOSIS — Z00.00 MEDICARE ANNUAL WELLNESS VISIT, SUBSEQUENT: ICD-10-CM

## 2025-01-24 PROBLEM — H34.8110 CENTRAL RETINAL VEIN OCCLUSION WITH MACULAR EDEMA OF RIGHT EYE: Status: RESOLVED | Noted: 2025-01-07 | Resolved: 2025-01-24

## 2025-01-24 RX ORDER — METOPROLOL TARTRATE 25 MG/1
25 TABLET, FILM COATED ORAL 2 TIMES DAILY
Qty: 180 TABLET | Refills: 1 | Status: SHIPPED | OUTPATIENT
Start: 2025-01-24 | End: 2025-07-23

## 2025-01-24 RX ORDER — CYCLOBENZAPRINE HCL 5 MG
TABLET ORAL
Qty: 30 TABLET | Refills: 2 | Status: SHIPPED | OUTPATIENT
Start: 2025-01-24

## 2025-01-24 NOTE — PROGRESS NOTES
"Subjective   Patient ID: Елена Perez is a 89 y.o. female who presents for Follow-up (6 month follow-up. ).        HPI    89 y/o female with HTN, hypothyroidism ( ESt with endo ) , hyperparathyroidism , ckd stage3   She is here with her son           Stiffness of joints           Visit Vitals  /76   Pulse 97   Ht 1.676 m (5' 6\")   Wt 78.7 kg (173 lb 6.4 oz)   SpO2 95%   BMI 27.99 kg/m²   Smoking Status Every Day   BSA 1.91 m²      No LMP recorded.   Current Outpatient Medications   Medication Instructions    acetaminophen (TYLENOL) 325-650 mg, Every 4 hours PRN    amLODIPine (NORVASC) 10 mg, oral, Daily    apixaban (ELIQUIS) 5 mg, oral, 2 times daily    cholecalciferol (Vitamin D-3) 5,000 Units tablet 1 tablet, Daily    cyclobenzaprine (Flexeril) 5 mg tablet 1 tab by mouth for stiffness , as needed    fluticasone (Flonase) 50 mcg/actuation nasal spray 1 spray, Daily    hydroCHLOROthiazide (HYDRODIURIL) 25 mg, oral, Daily    levothyroxine (Synthroid, Levoxyl) 125 mcg tablet Take 0.5 tablet by mouth every day x6 days, and 1 full tablet on day7    metoprolol tartrate (LOPRESSOR) 25 mg, oral, 2 times daily    sennosides-docusate sodium (Lindsey-Colace) 8.6-50 mg tablet 1 tablet, Daily PRN      Social History     Tobacco Use    Smoking status: Every Day     Types: Cigarettes    Smokeless tobacco: Never    Tobacco comments:     7/23/24: 3/4 ppd , smokes only at home   Substance Use Topics    Alcohol use: Not Currently        Review of Systems    Constitutional : No feeling poorly / fevers/ chills / night sweats/ fatigue   Cardiovascular : No CP /Palpitations/ lower extremity edema / syncope   Respiratory : No Cough /LUZ/Dyspnea at rest   Gastrointestinal : No abd pain / N/V  No bloody stools/ melena / constipation  Endo : No polyuria/polydipsia/ muscle weakness / sluggishness   CNS: No confusion / HA/ tingling/ numbness/ weakness of extremities  Psychiatric: No anxiety/ depression/ SI/HI    All other systems have been " reviewed and are negative for complaint       Physical Exam    Constitutional : Vitals reviewed. Alert and in no distress  Cardiovascular : irregular rhythm noted No pericardial rub/ gallop, no peripheral edema   Pulmonary: No respiratory distress, CTAB   MSK : Normal gait and station , strength and tone   Skin: Warm to touch ,  normal skin turgor   Neurologic : CNs 2-12 grossly intact , no obvious FNDs  Psych : A,Ox3, normal mood and affect      Assessment/Plan   Diagnoses and all orders for this visit:  Primary hypertension  Need for influenza vaccination  -     Flu vaccine, trivalent, preservative free, HIGH-DOSE, age 65y+ (Fluzone)  Diabetes mellitus without complication (Multi)  CKD stage G3a/A1, GFR 45-59 and albumin creatinine ratio <30 mg/g (Multi)  -     CBC; Future  -     Comprehensive Metabolic Panel; Future  -     Albumin-Creatinine Ratio, Urine Random; Future  Primary osteoarthritis, unspecified site  -     cyclobenzaprine (Flexeril) 5 mg tablet; 1 tab by mouth for stiffness , as needed  Abnormal TSH  -     Triiodothyronine, Total; Future  Medicare annual wellness visit, subsequent  Hypothyroidism, unspecified type  -     TSH with reflex to Free T4 if abnormal; Future  Mixed hyperlipidemia  -     Lipid Panel; Future  Vitamin D insufficiency  -     Vitamin D 25-Hydroxy,Total (for eval of Vitamin D levels); Future  IGT (impaired glucose tolerance)  -     Hemoglobin A1C; Future  Atrial fibrillation, unspecified type (Multi)  -     metoprolol tartrate (Lopressor) 25 mg tablet; Take 1 tablet (25 mg) by mouth 2 times a day.  New onset a-fib (Multi)  -     ECG 12 lead (Clinic Performed)  -     Referral to Cardiology; Future  -     apixaban (Eliquis) 5 mg tablet; Take 1 tablet (5 mg) by mouth 2 times a day.    89 y/o female with HTN, hypothyroidism ( ESt with endo ) , hyperparathyroidism , ckd stage3   She is here with her son     New onset afib on auscultation and EKG   Start BB for rate control   Cardiology  referral     ROMAN?DS?-VASc Score : 4 . Needs AC     Start Eliquis as above     High complexity  42043           Conditions addressed and mgmt as noted above.  Pertinent labs, images/ imaging reports , chart review was done .   Age appropriate labs / labs for mgmt of chronic medical conditions ordered, further mgmt pending the results.       This note is intended for the physician writing it, as well as to communicate findings to other healthcare professionals. These notes use medical lexicon that may be misunderstood by non medical persons. Therefore, interpretations of medical notes and terminology should be approached with caution.

## 2025-01-27 ENCOUNTER — APPOINTMENT (OUTPATIENT)
Dept: OPHTHALMOLOGY | Facility: CLINIC | Age: OVER 89
End: 2025-01-27
Payer: COMMERCIAL

## 2025-01-27 DIAGNOSIS — H34.8110 CENTRAL RETINAL VEIN OCCLUSION WITH MACULAR EDEMA OF RIGHT EYE: Primary | ICD-10-CM

## 2025-01-27 PROCEDURE — 92134 CPTRZ OPH DX IMG PST SGM RTA: CPT | Performed by: OPHTHALMOLOGY

## 2025-01-27 PROCEDURE — 99213 OFFICE O/P EST LOW 20 MIN: CPT | Performed by: OPHTHALMOLOGY

## 2025-01-27 PROCEDURE — 67028 INJECTION EYE DRUG: CPT | Mod: RIGHT SIDE | Performed by: OPHTHALMOLOGY

## 2025-01-27 RX ADMIN — CIPROFLOXACIN HYDROCHLORIDE 1.25 MG: 500 TABLET ORAL at 09:00

## 2025-01-27 ASSESSMENT — CONF VISUAL FIELD
OS_INFERIOR_TEMPORAL_RESTRICTION: 0
OS_SUPERIOR_TEMPORAL_RESTRICTION: 0
OD_INFERIOR_NASAL_RESTRICTION: 0
OD_INFERIOR_TEMPORAL_RESTRICTION: 0
OS_SUPERIOR_NASAL_RESTRICTION: 0
OS_NORMAL: 1
OD_SUPERIOR_NASAL_RESTRICTION: 0
OD_NORMAL: 1
OS_INFERIOR_NASAL_RESTRICTION: 0
OD_SUPERIOR_TEMPORAL_RESTRICTION: 0

## 2025-01-27 ASSESSMENT — TONOMETRY
IOP_METHOD: GOLDMANN APPLANATION
OD_IOP_MMHG: 18
OS_IOP_MMHG: 18

## 2025-01-27 ASSESSMENT — VISUAL ACUITY
OS_PH_SC: 20/25-2
OS_SC: 20/30
OD_SC: 20/200-1
METHOD: SNELLEN - LINEAR

## 2025-01-27 NOTE — PROGRESS NOTES
Assessment/Plan     Referral from Dr. Archer      Diagnoses and all orders for this visit:  Central retinal vein occlusion with macular edema of right eye  89 YO F presents with complaint of right eye painless vision loss for the past 6 months. Presented to [facility] ED 11/2024 for evaluation but did not see ophthalmology at that time.   (+)med hx of CKD and Benign Essential Hypertension  On DFE and MAC OCT extensive macular edema with hemorrhages noted right eye  Reports decreased vision right eye about 6 months  No pain  Hx/ of HTN  CRVO OD with ME  Discussed r/b/a/c of intravitreal antiVEGF, patient would like to proceed  MIL OD without difficulty today  1 month follow up    Pseudophakia OU  Ocular HTN OU  -Being followed by Dr. Archer

## 2025-01-29 ENCOUNTER — CLINICAL SUPPORT (OUTPATIENT)
Dept: EMERGENCY MEDICINE | Facility: HOSPITAL | Age: OVER 89
End: 2025-01-29
Payer: COMMERCIAL

## 2025-01-29 ENCOUNTER — HOSPITAL ENCOUNTER (EMERGENCY)
Facility: HOSPITAL | Age: OVER 89
Discharge: HOME | End: 2025-01-30
Attending: STUDENT IN AN ORGANIZED HEALTH CARE EDUCATION/TRAINING PROGRAM
Payer: COMMERCIAL

## 2025-01-29 DIAGNOSIS — I48.91 ATRIAL FIBRILLATION, UNSPECIFIED TYPE (MULTI): Primary | ICD-10-CM

## 2025-01-29 LAB
ALBUMIN SERPL BCP-MCNC: 4.4 G/DL (ref 3.4–5)
ALP SERPL-CCNC: 69 U/L (ref 33–136)
ALT SERPL W P-5'-P-CCNC: 20 U/L (ref 7–45)
ANION GAP SERPL CALC-SCNC: 15 MMOL/L (ref 10–20)
AST SERPL W P-5'-P-CCNC: 32 U/L (ref 9–39)
BASOPHILS # BLD AUTO: 0.03 X10*3/UL (ref 0–0.1)
BASOPHILS NFR BLD AUTO: 0.4 %
BILIRUB SERPL-MCNC: 0.6 MG/DL (ref 0–1.2)
BUN SERPL-MCNC: 19 MG/DL (ref 6–23)
CALCIUM SERPL-MCNC: 9.6 MG/DL (ref 8.6–10.6)
CARDIAC TROPONIN I PNL SERPL HS: 8 NG/L (ref 0–34)
CHLORIDE SERPL-SCNC: 101 MMOL/L (ref 98–107)
CO2 SERPL-SCNC: 25 MMOL/L (ref 21–32)
CREAT SERPL-MCNC: 1.16 MG/DL (ref 0.5–1.05)
EGFRCR SERPLBLD CKD-EPI 2021: 45 ML/MIN/1.73M*2
EOSINOPHIL # BLD AUTO: 0.07 X10*3/UL (ref 0–0.4)
EOSINOPHIL NFR BLD AUTO: 1 %
ERYTHROCYTE [DISTWIDTH] IN BLOOD BY AUTOMATED COUNT: 14.5 % (ref 11.5–14.5)
GLUCOSE SERPL-MCNC: 122 MG/DL (ref 74–99)
HCT VFR BLD AUTO: 37.6 % (ref 36–46)
HGB BLD-MCNC: 12.5 G/DL (ref 12–16)
IMM GRANULOCYTES # BLD AUTO: 0.02 X10*3/UL (ref 0–0.5)
IMM GRANULOCYTES NFR BLD AUTO: 0.3 % (ref 0–0.9)
LYMPHOCYTES # BLD AUTO: 1.91 X10*3/UL (ref 0.8–3)
LYMPHOCYTES NFR BLD AUTO: 26.1 %
MCH RBC QN AUTO: 27.2 PG (ref 26–34)
MCHC RBC AUTO-ENTMCNC: 33.2 G/DL (ref 32–36)
MCV RBC AUTO: 82 FL (ref 80–100)
MONOCYTES # BLD AUTO: 0.78 X10*3/UL (ref 0.05–0.8)
MONOCYTES NFR BLD AUTO: 10.7 %
NEUTROPHILS # BLD AUTO: 4.5 X10*3/UL (ref 1.6–5.5)
NEUTROPHILS NFR BLD AUTO: 61.5 %
NRBC BLD-RTO: 0 /100 WBCS (ref 0–0)
PLATELET # BLD AUTO: 222 X10*3/UL (ref 150–450)
POTASSIUM SERPL-SCNC: 4.9 MMOL/L (ref 3.5–5.3)
PROT SERPL-MCNC: 8.2 G/DL (ref 6.4–8.2)
RBC # BLD AUTO: 4.6 X10*6/UL (ref 4–5.2)
SODIUM SERPL-SCNC: 136 MMOL/L (ref 136–145)
WBC # BLD AUTO: 7.3 X10*3/UL (ref 4.4–11.3)

## 2025-01-29 PROCEDURE — 80053 COMPREHEN METABOLIC PANEL: CPT | Performed by: EMERGENCY MEDICINE

## 2025-01-29 PROCEDURE — 84484 ASSAY OF TROPONIN QUANT: CPT | Performed by: STUDENT IN AN ORGANIZED HEALTH CARE EDUCATION/TRAINING PROGRAM

## 2025-01-29 PROCEDURE — 36415 COLL VENOUS BLD VENIPUNCTURE: CPT | Performed by: EMERGENCY MEDICINE

## 2025-01-29 PROCEDURE — 85025 COMPLETE CBC W/AUTO DIFF WBC: CPT | Performed by: STUDENT IN AN ORGANIZED HEALTH CARE EDUCATION/TRAINING PROGRAM

## 2025-01-29 PROCEDURE — 99284 EMERGENCY DEPT VISIT MOD MDM: CPT | Performed by: STUDENT IN AN ORGANIZED HEALTH CARE EDUCATION/TRAINING PROGRAM

## 2025-01-29 PROCEDURE — 93005 ELECTROCARDIOGRAM TRACING: CPT

## 2025-01-29 PROCEDURE — 85025 COMPLETE CBC W/AUTO DIFF WBC: CPT | Performed by: EMERGENCY MEDICINE

## 2025-01-29 PROCEDURE — 80053 COMPREHEN METABOLIC PANEL: CPT | Performed by: STUDENT IN AN ORGANIZED HEALTH CARE EDUCATION/TRAINING PROGRAM

## 2025-01-29 PROCEDURE — 84484 ASSAY OF TROPONIN QUANT: CPT | Performed by: EMERGENCY MEDICINE

## 2025-01-29 RX ORDER — METOPROLOL TARTRATE 50 MG/1
25 TABLET ORAL ONCE
Status: COMPLETED | OUTPATIENT
Start: 2025-01-29 | End: 2025-01-30

## 2025-01-29 ASSESSMENT — PAIN SCALES - GENERAL: PAINLEVEL_OUTOF10: 0 - NO PAIN

## 2025-01-29 ASSESSMENT — PAIN - FUNCTIONAL ASSESSMENT: PAIN_FUNCTIONAL_ASSESSMENT: 0-10

## 2025-01-29 ASSESSMENT — COLUMBIA-SUICIDE SEVERITY RATING SCALE - C-SSRS
2. HAVE YOU ACTUALLY HAD ANY THOUGHTS OF KILLING YOURSELF?: NO
1. IN THE PAST MONTH, HAVE YOU WISHED YOU WERE DEAD OR WISHED YOU COULD GO TO SLEEP AND NOT WAKE UP?: NO
6. HAVE YOU EVER DONE ANYTHING, STARTED TO DO ANYTHING, OR PREPARED TO DO ANYTHING TO END YOUR LIFE?: NO

## 2025-01-29 NOTE — ED TRIAGE NOTES
Pt presents to ED feeling SOB with a rapid HR, onset this morning. Per family HR was in the 180's. Pt was diagnosed with A-Fib a few days ago. Pt also reports intermittent CP, is currently pain free

## 2025-01-30 VITALS
RESPIRATION RATE: 16 BRPM | HEIGHT: 66 IN | OXYGEN SATURATION: 98 % | DIASTOLIC BLOOD PRESSURE: 71 MMHG | TEMPERATURE: 98.2 F | WEIGHT: 173 LBS | HEART RATE: 67 BPM | BODY MASS INDEX: 27.8 KG/M2 | SYSTOLIC BLOOD PRESSURE: 111 MMHG

## 2025-01-30 LAB
CARDIAC TROPONIN I PNL SERPL HS: 8 NG/L (ref 0–34)
Q ONSET: 219 MS
QRS COUNT: 17 BEATS
QRS DURATION: 76 MS
QT INTERVAL: 344 MS
QTC CALCULATION(BAZETT): 454 MS
QTC FREDERICIA: 414 MS
R AXIS: 20 DEGREES
T AXIS: 0 DEGREES
T OFFSET: 391 MS
VENTRICULAR RATE: 105 BPM

## 2025-01-30 PROCEDURE — 84484 ASSAY OF TROPONIN QUANT: CPT | Performed by: STUDENT IN AN ORGANIZED HEALTH CARE EDUCATION/TRAINING PROGRAM

## 2025-01-30 PROCEDURE — 84443 ASSAY THYROID STIM HORMONE: CPT | Performed by: EMERGENCY MEDICINE

## 2025-01-30 PROCEDURE — 36415 COLL VENOUS BLD VENIPUNCTURE: CPT | Performed by: STUDENT IN AN ORGANIZED HEALTH CARE EDUCATION/TRAINING PROGRAM

## 2025-01-30 PROCEDURE — 2500000001 HC RX 250 WO HCPCS SELF ADMINISTERED DRUGS (ALT 637 FOR MEDICARE OP): Performed by: PEDIATRICS

## 2025-01-30 RX ADMIN — METOPROLOL TARTRATE 25 MG: 50 TABLET, FILM COATED ORAL at 00:02

## 2025-01-30 NOTE — DISCHARGE INSTRUCTIONS
We saw you today for shortness of breath. We think your heart rate increased due to your newly diagnosed atrial fibrillation. We gave you here your home med. Your blood draws are re-assuring.     Reasons to return to the ED:   -Chest pain  -Palpitations  -Shortness of breath  -feeling tired, dizzy or about to pass out  -Confused  -Any other concerns

## 2025-01-30 NOTE — ED PROVIDER NOTES
Emergency Department Provider Note        History of Present Illness     History provided by: Patient and Family Member  Limitations to History: None  External Records Reviewed with Brief Summary: PMHx of hypothyroidism, hypertension, CKD stage 3, DM, hyperlipidemia and new diagnosis of A-fib on 01/24/25. No recent admission. During visit of 01/24/25 she was noted to have an irregular rhythm for which an EKG was done. She was diagnosed with a-fib. She was started on Metoprolol and Eliquis.     HPI:  Елена Perez is a 89 y.o. female with PMHx of hypothyroidism, hypertension, CKD stage 3, DM, hyperlipidemia and new diagnosis of A-fib on 01/24/25 that presents for shortness of breath. She states that she felt shortness of breath this AM. Her son had a pulse ox and at that time her HR was 180s. He gave her one of his meds (he also has a-fib) at 11am. He doesn't remember the name. She states that after that medicine she did feel better. During the day, she had another episode of shortness of breath and son noted increased HR for which they brought her into the ED. She did report that during these episodes she felt nauseous and about to pass out. Of note, the pharmacy did not have metoprolol available until today. No LOC, fevers, headache, dizziness, chest pain, palpitations, abdominal pain, emesis.     Meds:   Metoprolol 25mg BID  Eliquis 5mg BID  Hydrochlorothiazide 25mg q D  Amlodipine 10mg q D      Physical Exam   Triage vitals:  T 36.7 °C (98.1 °F)  HR (!) 112  /82  RR 17  O2 96 % None (Room air)    General: Awake, alert, in no acute distress  Eyes: Gaze conjugate.  No scleral icterus or injection  HENT: Normo-cephalic, atraumatic. No stridor  CV: Regular rate, irregular rhythm. Radial pulses 2+ bilaterally  Resp: Breathing non-labored, speaking in full sentences.  Clear to auscultation bilaterally  GI: Soft, non-distended, non-tender. No rebound or guarding.  MSK/Extremities: No gross bony deformities.  Moving all extremities  Skin: Warm. Appropriate color  Neuro: Alert. Oriented. Face symmetric. Speech is fluent.  Gross strength and sensation intact in b/l UE and LEs  Psych: Appropriate mood and affect    Medical Decision Making & ED Course   Medical Decision Makin y.o. female with PMHx of hypothyroidism, hypertension, CKD stage 3, DM, hyperlipidemia and new diagnosis of A-fib on 25 that presents for shortness of breath. VS initially with tachycardia to 112. On my exam, she was not tachycardic. Repeat VS showed normal HR. Otherwise, she did have an irregular rhythm on exam. CBC re-assuring. First trop negative. Will give her home med (metoprolol). Pending 2nd trop.     Patient feels better. 2nd trop was negative. Will discharge home.     Differential diagnoses considered include but are not limited to: A-fib vs cardiac ischemia     Social Determinants of Health which Significantly Impact Care: None identified     EKG Independent Interpretation:  It demonstrates irregular rhythm (a-fib) with a ventricular rate of 105. Normal axis. Qtc 454ms. QRS 76ms . No ST elevation.    Independent Result Review and Interpretation:  Please see [facility]    Chronic conditions affecting the patient's care: As documented above in MDM    The patient was discussed with the following consultants/services: None    Care Considerations: As documented above in [facility]    ED Course:  ED Course as of 25   2340 Emergency Medicine Attending Attestation:     [unfilled]    The patient was seen by the resident/fellow.  I have personally performed a substantive portion of the encounter.  I have seen and examined the patient; agree with the workup, evaluation, MDM, management and diagnosis.  The care plan has been discussed with the resident; I have reviewed the resident's note and agree with the documented findings.      Patient with recently diagnosed atrial fibrillation (on metoprolol and Eliquis) who presents to  the emergency department for shortness of breath as well as palpitations.  Patient states that she has been unable to fill her metoprolol and Eliquis, although she took a few of her sons medication and she also has atrial fibrillation.  Patient noted some shortness of breath earlier today with a high heart rate a few times at the day.  On exam, patient is well-appearing, in no acute distress, sitting comfortably in the gurney with resolution of her symptoms.  She has clear breath sounds bilaterally with symmetric chest rise.  She has an irregularly irregular rhythm with a normal heart rate.  She has a soft benign abdomen.  She has trace lower extremity edema.  Patient presented to the emergency department with shortness of breath likely in the setting of atrial fibrillation with intermittent RVR secondary to not being able to fill her metoprolol.  She is now in a normal heart rate and asymptomatic.  Will evaluate for any signs suggestive of heart failure, any signs just of ischemia, we will plan to give her metoprolol and should she continue to feel well, she will be likely appropriate for discharge.  She has cardiology follow-up next week.    I independently interpreted patient's EKG and agree with the above mentioned interpretation.      Jerson Reyes MD   [AM]      ED Course User Index  [AM] Jerosn Reyes MD         Diagnoses as of 01/30/25 0225   Atrial fibrillation, unspecified type (Multi)     Disposition   As a result of the work-up, the patient was discharged home.  she was informed of her diagnosis and instructed to come back with any concerns or worsening of condition.  she and was agreeable to the plan as discussed above.  she was given the opportunity to ask questions.  All of the patient's questions were answered.    Procedures   Procedures    Patient seen and discussed with ED attending physician.    Nancy Heaton MD  Emergency Medicine       Nancy Heaton MD  01/30/25  [id number]

## 2025-02-02 RX ORDER — CIPROFLOXACIN HYDROCHLORIDE 500 MG/1
1.25 TABLET ORAL
Status: COMPLETED | OUTPATIENT
Start: 2025-01-27 | End: 2025-01-27

## 2025-02-02 ASSESSMENT — EXTERNAL EXAM - RIGHT EYE: OD_EXAM: NORMAL

## 2025-02-02 ASSESSMENT — CUP TO DISC RATIO
OS_RATIO: .3
OD_RATIO: .3

## 2025-02-02 ASSESSMENT — SLIT LAMP EXAM - LIDS
COMMENTS: DERMATOCHALASIS, MGD
COMMENTS: DERMATOCHALASIS, MGD

## 2025-02-02 ASSESSMENT — EXTERNAL EXAM - LEFT EYE: OS_EXAM: NORMAL

## 2025-02-03 ENCOUNTER — CLINICAL SUPPORT (OUTPATIENT)
Dept: EMERGENCY MEDICINE | Facility: HOSPITAL | Age: OVER 89
End: 2025-02-03
Payer: COMMERCIAL

## 2025-02-03 ENCOUNTER — HOSPITAL ENCOUNTER (EMERGENCY)
Facility: HOSPITAL | Age: OVER 89
Discharge: HOME | End: 2025-02-03
Attending: EMERGENCY MEDICINE
Payer: COMMERCIAL

## 2025-02-03 ENCOUNTER — APPOINTMENT (OUTPATIENT)
Dept: RADIOLOGY | Facility: HOSPITAL | Age: OVER 89
End: 2025-02-03
Payer: COMMERCIAL

## 2025-02-03 VITALS
TEMPERATURE: 97.2 F | RESPIRATION RATE: 20 BRPM | HEART RATE: 105 BPM | HEIGHT: 66 IN | DIASTOLIC BLOOD PRESSURE: 79 MMHG | SYSTOLIC BLOOD PRESSURE: 112 MMHG | BODY MASS INDEX: 27.64 KG/M2 | OXYGEN SATURATION: 96 % | WEIGHT: 172 LBS

## 2025-02-03 DIAGNOSIS — I73.9 PERIPHERAL VASCULAR DISEASE (CMS-HCC): ICD-10-CM

## 2025-02-03 DIAGNOSIS — N39.0 URINARY TRACT INFECTION WITHOUT HEMATURIA, SITE UNSPECIFIED: Primary | ICD-10-CM

## 2025-02-03 LAB
ALBUMIN SERPL BCP-MCNC: 4.3 G/DL (ref 3.4–5)
ALP SERPL-CCNC: 67 U/L (ref 33–136)
ALT SERPL W P-5'-P-CCNC: 20 U/L (ref 7–45)
ANION GAP SERPL CALC-SCNC: 13 MMOL/L (ref 10–20)
APPEARANCE UR: CLEAR
AST SERPL W P-5'-P-CCNC: 22 U/L (ref 9–39)
BACTERIA #/AREA URNS AUTO: ABNORMAL /HPF
BASOPHILS # BLD AUTO: 0.02 X10*3/UL (ref 0–0.1)
BASOPHILS NFR BLD AUTO: 0.2 %
BILIRUB SERPL-MCNC: 0.7 MG/DL (ref 0–1.2)
BILIRUB UR STRIP.AUTO-MCNC: NEGATIVE MG/DL
BUN SERPL-MCNC: 19 MG/DL (ref 6–23)
CALCIUM SERPL-MCNC: 9.9 MG/DL (ref 8.6–10.6)
CARDIAC TROPONIN I PNL SERPL HS: 8 NG/L (ref 0–34)
CARDIAC TROPONIN I PNL SERPL HS: 9 NG/L (ref 0–34)
CHLORIDE SERPL-SCNC: 100 MMOL/L (ref 98–107)
CO2 SERPL-SCNC: 28 MMOL/L (ref 21–32)
COLOR UR: ABNORMAL
CREAT SERPL-MCNC: 0.98 MG/DL (ref 0.5–1.05)
EGFRCR SERPLBLD CKD-EPI 2021: 55 ML/MIN/1.73M*2
EOSINOPHIL # BLD AUTO: 0.11 X10*3/UL (ref 0–0.4)
EOSINOPHIL NFR BLD AUTO: 1.1 %
ERYTHROCYTE [DISTWIDTH] IN BLOOD BY AUTOMATED COUNT: 14.6 % (ref 11.5–14.5)
FLUAV RNA RESP QL NAA+PROBE: NOT DETECTED
FLUBV RNA RESP QL NAA+PROBE: NOT DETECTED
GLUCOSE SERPL-MCNC: 93 MG/DL (ref 74–99)
GLUCOSE UR STRIP.AUTO-MCNC: NORMAL MG/DL
HCT VFR BLD AUTO: 39 % (ref 36–46)
HGB BLD-MCNC: 13.1 G/DL (ref 12–16)
IMM GRANULOCYTES # BLD AUTO: 0.05 X10*3/UL (ref 0–0.5)
IMM GRANULOCYTES NFR BLD AUTO: 0.5 % (ref 0–0.9)
INR PPP: 1.3 (ref 0.9–1.1)
KETONES UR STRIP.AUTO-MCNC: NEGATIVE MG/DL
LEUKOCYTE ESTERASE UR QL STRIP.AUTO: ABNORMAL
LYMPHOCYTES # BLD AUTO: 3.26 X10*3/UL (ref 0.8–3)
LYMPHOCYTES NFR BLD AUTO: 33.7 %
MAGNESIUM SERPL-MCNC: 2.42 MG/DL (ref 1.6–2.4)
MCH RBC QN AUTO: 27.6 PG (ref 26–34)
MCHC RBC AUTO-ENTMCNC: 33.6 G/DL (ref 32–36)
MCV RBC AUTO: 82 FL (ref 80–100)
MONOCYTES # BLD AUTO: 0.97 X10*3/UL (ref 0.05–0.8)
MONOCYTES NFR BLD AUTO: 10 %
MUCOUS THREADS #/AREA URNS AUTO: ABNORMAL /LPF
NEUTROPHILS # BLD AUTO: 5.25 X10*3/UL (ref 1.6–5.5)
NEUTROPHILS NFR BLD AUTO: 54.5 %
NITRITE UR QL STRIP.AUTO: ABNORMAL
NRBC BLD-RTO: 0 /100 WBCS (ref 0–0)
PH UR STRIP.AUTO: 6 [PH]
PHOSPHATE SERPL-MCNC: 3.9 MG/DL (ref 2.5–4.9)
PLATELET # BLD AUTO: 220 X10*3/UL (ref 150–450)
POTASSIUM SERPL-SCNC: 4.4 MMOL/L (ref 3.5–5.3)
PROT SERPL-MCNC: 8.1 G/DL (ref 6.4–8.2)
PROT UR STRIP.AUTO-MCNC: NEGATIVE MG/DL
PROTHROMBIN TIME: 15.1 SECONDS (ref 9.8–12.8)
RBC # BLD AUTO: 4.74 X10*6/UL (ref 4–5.2)
RBC # UR STRIP.AUTO: NEGATIVE /UL
RBC #/AREA URNS AUTO: ABNORMAL /HPF
RSV RNA RESP QL NAA+PROBE: NOT DETECTED
SARS-COV-2 RNA RESP QL NAA+PROBE: NOT DETECTED
SODIUM SERPL-SCNC: 137 MMOL/L (ref 136–145)
SP GR UR STRIP.AUTO: 1.02
SQUAMOUS #/AREA URNS AUTO: ABNORMAL /HPF
TSH SERPL-ACNC: 3.66 MIU/L (ref 0.44–3.98)
UROBILINOGEN UR STRIP.AUTO-MCNC: NORMAL MG/DL
WBC # BLD AUTO: 9.7 X10*3/UL (ref 4.4–11.3)
WBC #/AREA URNS AUTO: ABNORMAL /HPF

## 2025-02-03 PROCEDURE — 36415 COLL VENOUS BLD VENIPUNCTURE: CPT

## 2025-02-03 PROCEDURE — 71275 CT ANGIOGRAPHY CHEST: CPT

## 2025-02-03 PROCEDURE — 85610 PROTHROMBIN TIME: CPT

## 2025-02-03 PROCEDURE — 2500000001 HC RX 250 WO HCPCS SELF ADMINISTERED DRUGS (ALT 637 FOR MEDICARE OP)

## 2025-02-03 PROCEDURE — 81001 URINALYSIS AUTO W/SCOPE: CPT | Performed by: EMERGENCY MEDICINE

## 2025-02-03 PROCEDURE — 93005 ELECTROCARDIOGRAM TRACING: CPT

## 2025-02-03 PROCEDURE — 84075 ASSAY ALKALINE PHOSPHATASE: CPT

## 2025-02-03 PROCEDURE — 71046 X-RAY EXAM CHEST 2 VIEWS: CPT

## 2025-02-03 PROCEDURE — 84484 ASSAY OF TROPONIN QUANT: CPT

## 2025-02-03 PROCEDURE — 71275 CT ANGIOGRAPHY CHEST: CPT | Performed by: STUDENT IN AN ORGANIZED HEALTH CARE EDUCATION/TRAINING PROGRAM

## 2025-02-03 PROCEDURE — 87637 SARSCOV2&INF A&B&RSV AMP PRB: CPT

## 2025-02-03 PROCEDURE — 99285 EMERGENCY DEPT VISIT HI MDM: CPT | Mod: 25 | Performed by: EMERGENCY MEDICINE

## 2025-02-03 PROCEDURE — 2550000001 HC RX 255 CONTRASTS: Performed by: EMERGENCY MEDICINE

## 2025-02-03 PROCEDURE — 71046 X-RAY EXAM CHEST 2 VIEWS: CPT | Performed by: RADIOLOGY

## 2025-02-03 PROCEDURE — 74174 CTA ABD&PLVS W/CONTRAST: CPT | Performed by: STUDENT IN AN ORGANIZED HEALTH CARE EDUCATION/TRAINING PROGRAM

## 2025-02-03 PROCEDURE — 83735 ASSAY OF MAGNESIUM: CPT

## 2025-02-03 PROCEDURE — 85025 COMPLETE CBC W/AUTO DIFF WBC: CPT

## 2025-02-03 PROCEDURE — 2500000004 HC RX 250 GENERAL PHARMACY W/ HCPCS (ALT 636 FOR OP/ED): Performed by: EMERGENCY MEDICINE

## 2025-02-03 PROCEDURE — 80069 RENAL FUNCTION PANEL: CPT | Performed by: EMERGENCY MEDICINE

## 2025-02-03 RX ORDER — CEPHALEXIN 250 MG/1
500 CAPSULE ORAL ONCE
Status: COMPLETED | OUTPATIENT
Start: 2025-02-03 | End: 2025-02-03

## 2025-02-03 RX ORDER — CEPHALEXIN 500 MG/1
500 CAPSULE ORAL 4 TIMES DAILY
Qty: 20 CAPSULE | Refills: 0 | Status: SHIPPED | OUTPATIENT
Start: 2025-02-03 | End: 2025-02-08

## 2025-02-03 RX ORDER — ATORVASTATIN CALCIUM 10 MG/1
10 TABLET, FILM COATED ORAL DAILY
Qty: 20 TABLET | Refills: 0 | Status: SHIPPED | OUTPATIENT
Start: 2025-02-03 | End: 2025-02-23

## 2025-02-03 RX ORDER — NAPROXEN SODIUM 220 MG/1
81 TABLET, FILM COATED ORAL DAILY
Qty: 360 TABLET | Refills: 0 | Status: SHIPPED | OUTPATIENT
Start: 2025-02-03 | End: 2026-02-03

## 2025-02-03 RX ORDER — CEFTRIAXONE 1 G/50ML
1 INJECTION, SOLUTION INTRAVENOUS ONCE
Status: DISCONTINUED | OUTPATIENT
Start: 2025-02-03 | End: 2025-02-03 | Stop reason: HOSPADM

## 2025-02-03 RX ORDER — NAPROXEN SODIUM 220 MG/1
81 TABLET, FILM COATED ORAL ONCE
Status: COMPLETED | OUTPATIENT
Start: 2025-02-03 | End: 2025-02-03

## 2025-02-03 RX ORDER — ATORVASTATIN CALCIUM 20 MG/1
10 TABLET, FILM COATED ORAL ONCE
Status: COMPLETED | OUTPATIENT
Start: 2025-02-03 | End: 2025-02-03

## 2025-02-03 RX ADMIN — CEPHALEXIN 500 MG: 250 CAPSULE ORAL at 20:24

## 2025-02-03 RX ADMIN — ATORVASTATIN CALCIUM 10 MG: 20 TABLET, FILM COATED ORAL at 20:24

## 2025-02-03 RX ADMIN — IOHEXOL 90 ML: 350 INJECTION, SOLUTION INTRAVENOUS at 12:51

## 2025-02-03 RX ADMIN — ASPIRIN 81 MG CHEWABLE TABLET 81 MG: 81 TABLET CHEWABLE at 20:24

## 2025-02-03 ASSESSMENT — LIFESTYLE VARIABLES
TOTAL SCORE: 0
HAVE YOU EVER FELT YOU SHOULD CUT DOWN ON YOUR DRINKING: NO
HAVE PEOPLE ANNOYED YOU BY CRITICIZING YOUR DRINKING: NO
EVER FELT BAD OR GUILTY ABOUT YOUR DRINKING: NO
EVER HAD A DRINK FIRST THING IN THE MORNING TO STEADY YOUR NERVES TO GET RID OF A HANGOVER: NO

## 2025-02-03 ASSESSMENT — PAIN - FUNCTIONAL ASSESSMENT: PAIN_FUNCTIONAL_ASSESSMENT: 0-10

## 2025-02-03 NOTE — PROGRESS NOTES
"Елена Perez is a 89 y.o. female on day 0 of admission presenting with No Principal Problem: There is no principal problem currently on the Problem List. Please update the Problem List and refresh..    Subjective   ***       Objective     Physical Exam    Last Recorded Vitals  Blood pressure 134/70, pulse 86, temperature 36.4 °C (97.5 °F), temperature source Temporal, resp. rate (!) 24, height 1.676 m (5' 6\"), weight 78 kg (172 lb), SpO2 (!) 93%.  Intake/Output last 3 Shifts:  No intake/output data recorded.    Relevant Results  {If you would like to pull in Medications, type .meds     If you would like to pull in Lab results for the last 24 hours, type .[id number]    If you would like to pull in Imaging results, type .imgrslt :99}    {Link to Stroke Scoring tools - Link :99}                        Assessment/Plan   Assessment & Plan    ***     {This patient does not have an ACP note on file for this encounter, please fill one out - Advance Care Planning Activity :99}      NANDO RASHID      "

## 2025-02-03 NOTE — ED TRIAGE NOTES
Pt presents with SOB, that she has had for a few days she reports that she came to the ED for these same symptoms a few days ago but has not had any improvement. She denies having any cough, chest pain, trouble swallowing or throat pain

## 2025-02-03 NOTE — ED PROVIDER NOTES
HPI   Chief Complaint   Patient presents with    Shortness of Breath       HPI  Pt is 89f with PMH HTN, HLD, DM, hypothyroidism on levothyroxine, hypoparathyroidism, CDK stage 3, a fib with RVR recently diagnosed and started on eloquis and metoprolol 25 BID who is presenting with SOB and general malaise. Of note pt was recently seen in ED for a fib with RVR with SOB because she could not fill her metoprolol script.    Today, she says her symptoms are not the same as when she came in last week. She says her symptoms started around when she started taking her metoprolol a few days ago and felt fine beforehand. She has general feelings of nervousness/anxiety with some SOB. She also describes general weakness that she isn't sure whether it is more on one side or not. She also says she has occasional abdominal pain that is sharp with possible L sided CP that is mostly diffuse but sometimes epigastric and more left sided that started yesterday that didn't go fully away after taking pepto bismol. It also did not go away after a BM which it normally does. She has occasional palpitations. She is normally constipated, but did have a BM yesterday. She has no f/c, n/v, leg swelling, changes in BM, urination.           Patient History   Past Medical History:   Diagnosis Date    Pain in leg, unspecified 02/15/2019    Leg pain     Past Surgical History:   Procedure Laterality Date    OTHER SURGICAL HISTORY  09/30/2022    No history of surgery     Family History   Problem Relation Name Age of Onset    Parkinsonism Mother      Liver cancer Sister      Pancreatic cancer Sister       Social History     Tobacco Use    Smoking status: Every Day     Types: Cigarettes    Smokeless tobacco: Never    Tobacco comments:     7/23/24: 3/4 ppd , smokes only at home   Substance Use Topics    Alcohol use: Not Currently    Drug use: Never       Physical Exam   ED Triage Vitals [02/03/25 0558]   Temperature Heart Rate Respirations BP   36.6 °C (97.9  °F) 90 18 126/82      Pulse Ox Temp Source Heart Rate Source Patient Position   97 % Temporal Monitor Sitting      BP Location FiO2 (%)     Left arm --       Physical Exam  Constitutional:       General: She is not in acute distress.     Appearance: She is well-developed. She is not ill-appearing or toxic-appearing.   Cardiovascular:      Rate and Rhythm: Tachycardia present. Rhythm regularly irregular.      Pulses: No decreased pulses.           Radial pulses are 2+ on the right side and 2+ on the left side.        Dorsalis pedis pulses are 2+ on the right side and 2+ on the left side.        Posterior tibial pulses are 2+ on the right side and 2+ on the left side.      Heart sounds: No murmur heard.     No friction rub. No gallop.   Pulmonary:      Effort: Pulmonary effort is normal. No tachypnea, accessory muscle usage or respiratory distress.      Breath sounds: No decreased breath sounds, wheezing, rhonchi or rales.   Chest:      Chest wall: No mass, tenderness or edema.   Abdominal:      Palpations: Abdomen is soft. There is no hepatomegaly, splenomegaly or mass.      Tenderness: There is no abdominal tenderness. There is no guarding or rebound.   Musculoskeletal:      Right lower leg: No edema.      Left lower leg: No edema.   Skin:     General: Skin is warm and dry.      Capillary Refill: Capillary refill takes less than 2 seconds.   Neurological:      General: No focal deficit present.      Mental Status: She is alert and oriented to person, place, and time.      Motor: Weakness present. No pronator drift.      Comments: Slight positive drift on LLE compared to right. Generalized weakness but no focal deficits.    Psychiatric:         Mood and Affect: Mood is anxious.           ED Course & MDM   ED Course as of 02/03/25 1453   Mon Feb 03, 2025   0835 ATTENDING ATTESTATION  89-year-old female with multiple medical comorbidities most notable for recent diagnosis of atrial fibrillation placed on Eliquis and  metoprolol presenting to the emergency department with a complaint of feeling generally unwell.  The patient states that more specifically she started to feel general malaise and global weakness may be more on the left side after she started her metoprolol about a week ago.  Symptoms been relatively persistent she assures me she felt fine before hand.  Denies any fevers or chills she feels nauseous without any vomiting, has vague left-sided lower chest and upper abdominal pain periodically, actually notes this after she has a bowel movement typically and it does get some relief with Maalox though not complete and occasionally will happen separate from any bowel movement.  She does suffer from chronic constipation but states this is a new symptom.  I find the patient to be clinically stable at the bedside atrial fibrillation with irregularly irregular pulses but symmetric bilaterally EKG was reassuring nonischemic there was ectopy noted with multiple PVCs but normotensive.  No fever.  A chest x-ray was obtained demonstrates what appears to be widening cardiomediastinal silhouette as well as a less apparent aorticopulmonary notch, there is no old for comparison unfortunately.  Given the patient's vague description of symptoms coupled with the neuroexam the demonstrated very slight left lower extremity drift given current NIH score of 1 I have concern for potential vascular pathology.  Pulses were good distally normal DP PT, we will assess for potential dissection or affiliate pathology.  The patient is out of the window for any sort of TNK or thrombectomy does not meet brain attack criteria, the drift and weakness in the left lower extremity may very well be volitional she describes chronic left hip discomfort.  Disposition is pending clinical results  [facility] [RH]   1301 Urinalysis with Reflex Microscopic(!)  Urinalysis nitrite positive with some leukocyte esterase however, had mucus and squamous cells on microscopy, the  patient is not having any urinary symptoms no dysuria no hematuria hesitancy or frequency with a low suspicion for UTI.  Because of this we will send for culture and notify the patient of any positive results no need for preemptive treatment [RH]      ED Course User Index  [RH] Rich DAVENPORT Yumiko,      Pt is 89f with PMH HTN, HLD, DM, hypothyroidism on levothyroxine, hypoparathyroidism, CDK stage 3, a fib with RVR recently diagnosed and started on eloquis and metoprolol 25 BID who is presenting with SOB and general malaise. Found to have slight LLE drift with NIH stroke scale of 1. CXR showed potential widened mediastinum. ECG showed a fib with rates in 100s with PVCs. Pulses intact bilaterally.  Pt likely has anxiety related to metoprolol, however given her vague description of symptoms with possible LLE drift, abdominal pain, CXR potential mediastinal widening, we will get angio of chest abdomen pelvis to evaluate for dissection. Low concern for PE given satting ok and on eloquis with no leg swelling or pain. Will also get viral swabs, trops, basic labs, and TSH.     ED Course Updates:  - TSH wnl   - viral swabs negative  - CBC wnl, no white count or anemia   - INR 1.3   - trops wnl   - CMP and lytes wnl   - UA 75 LE, 2+ nitrites, 1+ bacteria, 6-10 WBC. Asymptomatic so will wait for culture and not treat empirically.       No data recorded     Benjamin Coma Scale Score: 15 (02/03/25 0603 : Chan Moses RN)                           Medical Decision Making      Procedure  Procedures     Shreyas Pacheco  02/03/25 4581

## 2025-02-03 NOTE — PROGRESS NOTES
Patient has been identified as having an emergent need for administration of iodinated contrast for CT scan prior to result of laboratory studies OR despite known elevated GFR due to possibility of life and/or limb threatening pathology.    I acknowledge the risks and benefits of emergently proceeding with contrast administration including that, at present, it is the position of the [employer] that contrast induced nephropathy (ZULY) is a rare but possible consequence. At this time the benefits of proceeding with contrast administration outweigh the risks.    Attempts will be made to mitigate possible ZULY risk with IV fluid hydration if able.    Rich Calderon, [facility]\    [facility]

## 2025-02-03 NOTE — PROGRESS NOTES
Emergency Medicine Transition of Care Note.    I received Елена Perez in signout from Dr. Bourne.  Please see the previous ED provider note for all HPI, PE and MDM up to the time of signout at 1500. This is in addition to the primary record.    In brief Елена Perez is an 89 y.o. female with a past medical history of A-fib on Eliquis and metoprolol who presents to the ED for shortness of breath and abdominal pain.  Patient's shortness of breath was thought to have a component of related anxiety per previous ED team.  Her urinalysis displayed findings concerning for UTI for which patient was given a dose of keflex in the ED.  Given patient's shortness of breath and abdominal pain, CT angio chest/abdomen/pelvis had been ordered by previous ED team.  Remainder patient's lab work was unremarkable. Troponins were negative. Viral swabs were negative.    Chief Complaint   Patient presents with    Shortness of Breath     At the time of signout we were awaiting: CTA CAP    Please see ED course MDM for remainder of care.    ED Course as of 02/05/25 1843   Mon Feb 03, 2025   0835 ATTENDING ATTESTATION  89-year-old female with multiple medical comorbidities most notable for recent diagnosis of atrial fibrillation placed on Eliquis and metoprolol presenting to the emergency department with a complaint of feeling generally unwell.  The patient states that more specifically she started to feel general malaise and global weakness may be more on the left side after she started her metoprolol about a week ago.  Symptoms been relatively persistent she assures me she felt fine before hand.  Denies any fevers or chills she feels nauseous without any vomiting, has vague left-sided lower chest and upper abdominal pain periodically, actually notes this after she has a bowel movement typically and it does get some relief with Maalox though not complete and occasionally will happen separate from any bowel movement.  She does suffer from  chronic constipation but states this is a new symptom.  I find the patient to be clinically stable at the bedside atrial fibrillation with irregularly irregular pulses but symmetric bilaterally EKG was reassuring nonischemic there was ectopy noted with multiple PVCs but normotensive.  No fever.  A chest x-ray was obtained demonstrates what appears to be widening cardiomediastinal silhouette as well as a less apparent aorticopulmonary notch, there is no old for comparison unfortunately.  Given the patient's vague description of symptoms coupled with the neuroexam the demonstrated very slight left lower extremity drift given current NIH score of 1 I have concern for potential vascular pathology.  Pulses were good distally normal DP PT, we will assess for potential dissection or affiliate pathology.  The patient is out of the window for any sort of TNK or thrombectomy does not meet brain attack criteria, the drift and weakness in the left lower extremity may very well be volitional she describes chronic left hip discomfort.  Disposition is pending clinical results  [facility] [RH]   1301 Urinalysis with Reflex Microscopic(!)  Urinalysis nitrite positive with some leukocyte esterase however, had mucus and squamous cells on microscopy, the patient is not having any urinary symptoms no dysuria no hematuria hesitancy or frequency with a low suspicion for UTI.  Because of this we will send for culture and notify the patient of any positive results no need for preemptive treatment [RH]   0907 CT angio chest abdomen pelvis  IMPRESSION:  1. No thoracic or abdominal aortic aneurysm or acute aortic pathology.  2. 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 No evidence of contrast extravasation.  3. 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 Consider correlation with dedicated ultrasound/ALISA  PVR. Consider vascular surgery consultation.  4. Small-to-moderate  right-sided pleural effusion with atelectasis.  Trace left pleural effusion. Bilateral interlobular septal line  thickening. Small pericardial effusion. The constellation of these  findings may be secondary to volume overload.  5. Prominent mediastinal and enlarged bilateral hilar lymph nodes.  6. Enlarged right thyroid gland versus mass. Recommend correlation  with dedicated thyroid ultrasound.  7. Right sub 6 mm pulmonary nodules, recommend attention on follow-up  imaging.   [MH]      ED Course User Index  [MH] Son Barnett MD  [] Rich Calderon DO         Diagnoses as of 02/05/25 1843   Urinary tract infection without hematuria, site unspecified   Peripheral vascular disease (CMS-HCC)       Medical Decision Making  CT angio chest/abdomen/pelvis showed no thoracic or abdominal aortic aneurysm or acute aortic pathology. CT angio radiology read did note complete thrombosis of the left superficial femoral artery. Vascular surgery was consulted for thrombosis of the left superficial femoral artery.  Patient was noted to have dopplerable DP/PT and popliteal artery of the left lower extremity. Vascular surgery evaluated the patient and recommended outpatient vascular surgery follow-up as well as initiation of aspirin and statin. Vascular Surgery did not feel that any surgical intervention is indicated for her asymptomatic peripheral vascular disease. Patient initiated on aspirin and statin in the emergency department. Patient notes that she has a cardiology follow-up tomorrow and will further discuss metoprolol with her cardiologist. CT also noted incidental findings of enlarged right thyroid gland versus mass and right sub 6 mm pulmonary nodules, for which patient was informed  and instructed to follow-up closely with a primary care physician for further workup and monitoring. Patient remained stable, resting calmly. Her abdomen is soft, non-peritonitic. The patient is in no respiratory distress, satting well on  room air. Shared decision making was had with the patient who reported that she wanted to go home and felt comfortable being discharged home. Discussed ED findings, plan for outpatient primary care and vascular surgery follow-up, and strict return precautions for any new or worsening symptoms.  Patient and family stated understanding and agreement the plan.  All questions were answered.  Patient discharged in stable condition with scripts for keflex, aspirin, and atorvastatin. Return precautions were provided, for which the patient expressed understanding. The patient was discharged home in stable condition. They should feel free to return to the Emergency Department at any time should their condition worsen or should they have any questions or concerns.     Final diagnoses:   [N39.0] Urinary tract infection without hematuria, site unspecified   [I73.9] Peripheral vascular disease (CMS-HCC)           Procedure  Procedures    Patient presentation and plan discussed with attending physician.    Son Barnett MD

## 2025-02-03 NOTE — PROGRESS NOTES
Music Therapy Note    Елена Perez     Therapy Session  Referral Type: New referral this admission  Visit Type: New visit  Session Start Time: 0913  Session End Time: 0918  Intervention Delivery: In-person  Conflict of Service: None  Number of family members present: 1  Family Present for Session: Spouse/Significant Other  Family Participation: Supportive  Number of staff members present: 2     Pre-assessment  Unable to Assess Reason: Cognitive limitation         Treatment/Interventions       Post-assessment  Total Session Time (min): 5 minutes    Narrative  Assessment Detail: Physician requested Music therapy to help address stress and pain. Music Therapist received permission from the patient and began session  Intervention: The therapist  engaged the patient in repetitive rhythmic movement via singing a familar song palying the guitar.  Evaluation: The patienr responsed immediately with rhythmic body movments and  smiles. Patient enjoyed first song but passed on continuning the session    Education Documentation  No documentation found.

## 2025-02-03 NOTE — CONSULTS
"      VASCULAR SURGERY CONSULT NOTE    Assessment/Plan   Елена Perez is 89 y.o. female with history of CKD, T2DM, afib with eliquis who presents with abdominal pain, found incidentally on CT scan to have L SFA occlusion with poor signals.    Patient found to have an asymptomatic L SFA occlusion and thus there is no indication for revascularization at this time. She would benefit from medical therapy of peripheral vascular disease.     Plan:  Would recommend initiation of aspirin, statin. She should follow up with PCP for continued management of her medications and peripheral vascular disease.   No surgical intervention is indicated for asymptomatic peripheral vascular disease    Seen and d/w Dr. Castro, PGY3, to be staffed with attending, Dr. Brewster    Addendum after discussion with attending:   No indication for intervention; patient asymptomatic. Counseled re: claudication, ischemic rest pain, non-healing wounds. Can follow up as needed. Dispo per ED.     Discussed with Dr. Brewster    Subjective   HPI:  Елена Perez is 89 y.o. female with history of CKD, T2DM, afib with eliquis who presents with abdominal pain, found incidentally on CT scan to have L SFA occlusion with poor signals.     Patient states she feels \"stiff\" when she walks which limits her to ~50 yards of walking at a time. Denies claudication pains. Denies rest pain, sensory deficits, weakness in her legs. She recently was diagnosed with afib with RVR and prescribed eliquis/metoprolol and she reports that her abdominal pain started her new medications.     Vascular History:  None    PMH:   HTN  HLD  DM  Hypothyroidism  Hypoparathyroidism  CKD3  Afib with RVR     PSH:   Past Surgical History:   Procedure Laterality Date    OTHER SURGICAL HISTORY  09/30/2022    No history of surgery        Home Meds:  No current facility-administered medications on file prior to encounter.     Current Outpatient Medications on File Prior to Encounter   Medication Sig " "Dispense Refill    acetaminophen (Tylenol) 325 mg tablet Take 1-2 tablets (325-650 mg) by mouth every 4 hours if needed for mild pain (1 - 3).      amLODIPine (Norvasc) 10 mg tablet Take 1 tablet (10 mg) by mouth once daily. 90 tablet 3    apixaban (Eliquis) 5 mg tablet Take 1 tablet (5 mg) by mouth 2 times a day. 180 tablet 1    cholecalciferol (Vitamin D-3) 5,000 Units tablet Take 1 tablet (5,000 Units) by mouth once daily.      cyclobenzaprine (Flexeril) 5 mg tablet 1 tab by mouth for stiffness , as needed 30 tablet 2    fluticasone (Flonase) 50 mcg/actuation nasal spray Administer 1 spray into each nostril once daily. Shake gently. Before first use, prime pump. After use, clean tip and replace cap.      hydroCHLOROthiazide (HYDRODiuril) 25 mg tablet Take 1 tablet (25 mg) by mouth once daily. 90 tablet 3    levothyroxine (Synthroid, Levoxyl) 125 mcg tablet Take 0.5 tablet by mouth every day x6 days, and 1 full tablet on day7 52 tablet 3    metoprolol tartrate (Lopressor) 25 mg tablet Take 1 tablet (25 mg) by mouth 2 times a day. 180 tablet 1    sennosides-docusate sodium (Lindsey-Colace) 8.6-50 mg tablet Take 1 tablet by mouth once daily as needed.          Allergies:  No Known Allergies    SH/FH:   Every day smoker, 3/4 ppd  No alcohol use documented    ROS: 12 system negative except HPI    Objective   Vitals:  Heart Rate:  []   Temperature:  [36.2 °C (97.2 °F)-36.6 °C (97.9 °F)]   Respirations:  [11-35]   BP: (109-134)/(70-83)   Height:  [167.6 cm (5' 6\")]   Weight:  [78 kg (172 lb)]   Pulse Ox:  [92 %-97 %]     Exam:  Constitutional: No acute distress, resting comfortably  Neuro:  AOx3, grossly intact  ENMT: moist mucous membranes  Head/neck: atraumatic  CV: no tachycardia, irregularly irregular rhythm  Pulm: non-labored on room air  GI: soft, non-tender, non-distended  Skin: warm and dry  Musculoskeletal: moving all extremities  Extremities: warm, full ROM, 5/5 strength, no evidence of trauma or wounds  R " DP multiphasic, nondopperable R PT signal.  L DP nondopperable, monophasic PT signal.    Labs:  Results from last 7 days   Lab Units 02/03/25  0755 01/29/25  1831   WBC AUTO x10*3/uL 9.7 7.3   HEMOGLOBIN g/dL 13.1 12.5   PLATELETS AUTO x10*3/uL 220 222      Results from last 7 days   Lab Units 02/03/25  0755 01/29/25  1831   SODIUM mmol/L 137 136   POTASSIUM mmol/L 4.4 4.9   CHLORIDE mmol/L 100 101   CO2 mmol/L 28 25   BUN mg/dL 19 19   CREATININE mg/dL 0.98 1.16*   GLUCOSE mg/dL 93 122*   MAGNESIUM mg/dL 2.42*  --    PHOSPHORUS mg/dL 3.9  --       Results from last 7 days   Lab Units 02/03/25  0755   INR  1.3*   PROTIME seconds 15.1*           Imaging:  CT angio chest abdomen pelvis    Result Date: 2/3/2025  1. No thoracic or abdominal aortic aneurysm or acute aortic pathology. 2. 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 No evidence of contrast extravasation. 3. Complete thrombosis of the left superficial femoral artery (series 501, image 584). Consider correlation with dedicated ultrasound/ALISA PVR. Consider vascular surgery consultation. 4. Small-to-moderate right-sided pleural effusion with atelectasis. Trace left pleural effusion. Bilateral interlobular septal line thickening. Small pericardial effusion. The constellation of these findings may be secondary to volume overload. 5. Prominent mediastinal and enlarged bilateral hilar lymph nodes. 6. Enlarged right thyroid gland versus mass. Recommend correlation with dedicated thyroid ultrasound. 7. Right sub 6 mm pulmonary nodules, recommend attention on follow-up imaging.   I personally reviewed the images/study and I agree with the findings as stated by Gaurav Benson MD. This study was interpreted at [facility], [place], [place].   MACRO: Critical Finding:  See findings. Notification was initiated on 2/3/2025 at 5:08 pm by  Jones Medina.  (**-OCF-**) Instructions: Vascular surgery.    Signed by: Jones Medina 2/3/2025 5:09 PM Dictation workstation:   [id number]

## 2025-02-04 LAB
Q ONSET: 224 MS
QRS COUNT: 20 BEATS
QRS DURATION: 74 MS
QT INTERVAL: 316 MS
QTC CALCULATION(BAZETT): 446 MS
QTC FREDERICIA: 398 MS
R AXIS: -1 DEGREES
T AXIS: -59 DEGREES
T OFFSET: 382 MS
VENTRICULAR RATE: 120 BPM

## 2025-02-04 NOTE — DISCHARGE INSTRUCTIONS
You were noted to have a UTI as well as left superficial artery claudication the emergency department.  Follow-up with primary care within the next week.  Please take medications as prescribed.  Please return to the emergency department for any new or worsening symptoms.  The phone number for the primary care clinic at [facility] is [phone].

## 2025-02-05 ENCOUNTER — APPOINTMENT (OUTPATIENT)
Dept: CARDIOLOGY | Facility: CLINIC | Age: OVER 89
End: 2025-02-05
Payer: COMMERCIAL

## 2025-02-05 ENCOUNTER — OFFICE VISIT (OUTPATIENT)
Dept: CARDIOLOGY | Facility: CLINIC | Age: OVER 89
End: 2025-02-05
Payer: COMMERCIAL

## 2025-02-05 VITALS
BODY MASS INDEX: 27.76 KG/M2 | HEIGHT: 66 IN | OXYGEN SATURATION: 98 % | WEIGHT: 172.7 LBS | DIASTOLIC BLOOD PRESSURE: 82 MMHG | SYSTOLIC BLOOD PRESSURE: 111 MMHG | HEART RATE: 97 BPM

## 2025-02-05 DIAGNOSIS — I48.91 NEW ONSET A-FIB (MULTI): ICD-10-CM

## 2025-02-05 DIAGNOSIS — R06.09 DYSPNEA ON EXERTION: Primary | ICD-10-CM

## 2025-02-05 PROCEDURE — 3074F SYST BP LT 130 MM HG: CPT | Performed by: INTERNAL MEDICINE

## 2025-02-05 PROCEDURE — 1159F MED LIST DOCD IN RCRD: CPT | Performed by: INTERNAL MEDICINE

## 2025-02-05 PROCEDURE — 3079F DIAST BP 80-89 MM HG: CPT | Performed by: INTERNAL MEDICINE

## 2025-02-05 PROCEDURE — 99204 OFFICE O/P NEW MOD 45 MIN: CPT | Performed by: INTERNAL MEDICINE

## 2025-02-05 PROCEDURE — 99214 OFFICE O/P EST MOD 30 MIN: CPT | Performed by: INTERNAL MEDICINE

## 2025-02-05 PROCEDURE — 1126F AMNT PAIN NOTED NONE PRSNT: CPT | Performed by: INTERNAL MEDICINE

## 2025-02-05 RX ORDER — METOPROLOL SUCCINATE 50 MG/1
50 TABLET, EXTENDED RELEASE ORAL DAILY
Qty: 90 TABLET | Refills: 3 | Status: SHIPPED | OUTPATIENT
Start: 2025-02-05 | End: 2026-02-05

## 2025-02-05 ASSESSMENT — PAIN SCALES - GENERAL: PAINLEVEL_OUTOF10: 0-NO PAIN

## 2025-02-05 ASSESSMENT — ENCOUNTER SYMPTOMS
LOSS OF SENSATION IN FEET: 1
DEPRESSION: 1
OCCASIONAL FEELINGS OF UNSTEADINESS: 0

## 2025-02-05 NOTE — PROGRESS NOTES
Referred by Dr. Gabriel for New Patient Visit     History Of Present Illness:    Елена Perez is a 89 y.o. female with complex PMH (detailed below) presenting to outpatient cardiology clinic regarding atrial fibrillation.    She reports to clinic and what she feels is her usual state of health, is tolerating her outpatient med regimen without difficulty and has no acute cardiovascular complaints.  She does report chronic dyspnea on exertion which is been going on for several months.  Denies ever having overt chest pain or symptoms involving her left jaw or arm.  Occasionally does have palpitations, but denies presyncope, syncope, or changes in abdominal or lower extremity edema.  She is accompanied today by her son who confirms the aforementioned history, he is very involved in her care.    Past Medical History:  Hypertension  dyslipidemia  Atrial fibrillation with elevated [id number]  Type 2 diabetes   peripheral artery disease   - Incidental finding left superficial femoral artery occlusion  Hypothyroidism    Review of Systems   Constitutional:  No Weight Change, No Fever, No Chills, No Night Sweats, No Fatigue, No Malaise   ENT/Mouth:  No Hearing Changes, No Ear Pain, No Nasal Congestion, No  Sinus Pain, No Hoarseness, No sore throat, No Rhinorrhea, No Swallowing  Difficulty   Eyes:  No Eye Pain, No Swelling, No Redness, No Foreign Body, No Discharge, No Vision Changes   Cardiovascular:  See HPI   Respiratory:  No Cough, No Sputum, No Wheezing, No Smoke Exposure, No Dyspnea   Gastrointestinal:  No Nausea, No Vomiting, No Diarrhea, No  Constipation, No Pain, No Heartburn, No Anorexia, No Dysphagia, No  Hematochezia, No Melena, No Flatulence, No Jaundice   Genitourinary:  No Dysmenorrhea, No DUB, No Dyspareunia, No Dysuria, No  Urinary Frequency, No Hematuria, No Urinary Incontinence, No Urgency,  No Flank Pain, No Urinary Flow Changes   Musculoskeletal:  No Arthralgias, No Myalgias, No Joint Swelling, No   Joint Stiffness, No Back Pain, No Neck Pain, No Injury History   Skin:  No Skin Lesions, No Pruritis, No Hair Changes, No Breast/Skin Changes, No Nipple Discharge   Neuro:  No Weakness, No Numbness, No Paresthesias, No Loss of  Consciousness, No Syncope, No Dizziness, No Headache, No Coordination  Changes, No Recent Falls   Psych:  No Anxiety/Panic, No Depression, No Insomnia, No Delusions, No Rumination, No SI/HI/AH/VH, No Social Issues,  No Memory Changes, No Violence/Abuse Hx., No Eating Concerns   Heme/Lymph:  No Bruising, No Bleeding, No Transfusions History, No Lymphadenopathy   Endocrine:  No Polyuria, No Polydipsia, No Temperature Intolerance        Past Medical History:  She has a past medical history of Pain in leg, unspecified (02/15/2019).    Past Surgical History:  She has a past surgical history that includes Other surgical history (09/30/2022).      Social History:  She reports that she has been smoking cigarettes. She has never used smokeless tobacco. She reports that she does not currently use alcohol. She reports that she does not use drugs.    Family History:  Family History   Problem Relation Name Age of Onset    Parkinsonism Mother      Liver cancer Sister      Pancreatic cancer Sister          Allergies:  Patient has no known allergies.    Outpatient Medications:  Current Outpatient Medications   Medication Instructions    acetaminophen (TYLENOL) 325-650 mg, Every 4 hours PRN    amLODIPine (NORVASC) 10 mg, oral, Daily    apixaban (ELIQUIS) 5 mg, oral, 2 times daily    aspirin 81 mg, oral, Daily    atorvastatin (LIPITOR) 10 mg, oral, Daily    cephalexin (KEFLEX) 500 mg, oral, 4 times daily    cholecalciferol (Vitamin D-3) 5,000 Units tablet 1 tablet, Daily    cyclobenzaprine (Flexeril) 5 mg tablet 1 tab by mouth for stiffness , as needed    fluticasone (Flonase) 50 mcg/actuation nasal spray 1 spray, Daily    hydroCHLOROthiazide (HYDRODIURIL) 25 mg, oral, Daily    levothyroxine (Synthroid,  "Levoxyl) 125 mcg tablet Take 0.5 tablet by mouth every day x6 days, and 1 full tablet on day7    metoprolol succinate XL (TOPROL-XL) 50 mg, oral, Daily, Do not crush or chew.    sennosides-docusate sodium (Lindsey-Colace) 8.6-50 mg tablet 1 tablet, Daily PRN        Last Recorded Vitals:  Vitals:    02/05/25 0837   BP: 111/82   BP Location: Right arm   Patient Position: Sitting   BP Cuff Size: Adult   Pulse: 97   SpO2: 98%   Weight: 78.3 kg (172 lb 11.2 oz)   Height: 1.676 m (5' 6\")       Physical Exam:  GEN: calm, cooperative, asking appropriate questions  NEURO: Alert and oriented x3, CN2-12 intact, SILT, Moving all extremities without difficulty  HEENT: atraumatic, no goiter, no JVD, normal uvula   CARDS: PMI is non-displaced, RRR, no MRG  PULM: CTAB, no wheezes / rales / rhonchi   ABD: soft, non-distended, non-tender, non-tympanitic, BS in all 4 quadrants. No hepatosplenomegaly  : unremarkable  SKIN: healthy appearing, normal skin turgor   EXT: atraumatic  VASC: b/l radial pulses are brisk and equal            Last Labs:  CBC -  Lab Results   Component Value Date    WBC 9.7 02/03/2025    HGB 13.1 02/03/2025    HCT 39.0 02/03/2025    MCV 82 02/03/2025     02/03/2025       CMP -  Lab Results   Component Value Date    CALCIUM 9.9 02/03/2025    PHOS 3.9 02/03/2025    PROT 8.1 02/03/2025    ALBUMIN 4.3 02/03/2025    AST 22 02/03/2025    ALT 20 02/03/2025    ALKPHOS 67 02/03/2025    BILITOT 0.7 02/03/2025       LIPID PANEL -   Lab Results   Component Value Date    CHOL 206 (H) 07/24/2024    HDL 39.4 07/24/2024    CHHDL 5.2 07/24/2024    VLDL 23 07/24/2024    TRIG 113 07/24/2024    NHDL 167 (H) 07/24/2024       RENAL FUNCTION PANEL -   Lab Results   Component Value Date    K 4.4 02/03/2025    PHOS 3.9 02/03/2025       Lab Results   Component Value Date    HGBA1C 6.7 (H) 07/24/2024           Last Cardiology Tests:    ECG:  Encounter Date: 02/03/25   ECG 12 lead   Result Value    Ventricular Rate 120    QRS Duration " "74    QT Interval 316    QTC Calculation(Bazett) 446    R Axis -1    T Axis -59    QRS Count 20    Q Onset 224    T Offset 382    QTC Fredericia 398    Narrative    Atrial fibrillation with rapid ventricular response with premature ventricular or aberrantly conducted complexes  Possible Anterior infarct (cited on or before 29-JAN-2025)  Abnormal ECG  When compared with ECG of 29-JAN-2025 17:52,  No significant change was found  Confirmed by Jad Lantigua ([id number]) on 2/4/2025 12:13:19 AM         Echo:  Echo Results:  No results found for this or any previous visit from the past 365 days.       Ejection Fractions:  No results found for: \"EF\"    Cath:  No results found for this or any previous visit from the past 365 days.        Stress Test:  Stress Results:  No results found for this or any previous visit from the past 365 days.       Cardiac Imaging:  ECG 12 lead  Atrial fibrillation with rapid ventricular response with premature ventricular or aberrantly conducted complexes  Possible Anterior infarct (cited on or before 29-JAN-2025)  Abnormal ECG  When compared with ECG of 29-JAN-2025 17:52,  No significant change was found  Confirmed by Jad Lantigua ([id number]) on 2/4/2025 12:13:19 AM      Assessment/Plan   This is a clinically stable 89-year-old female here to establish care regarding her multiple cardiovascular conditions.  Has intermittently been taking metoprolol, and we discussed the importance of compliance to avoid rapid ventricular response/onset of her symptoms.  We talked about the need for stroke prevention given her elevated [id number] and for now we will pursue anticoagulation with apixaban.  At future visits we we will discuss the possibility of referral to structural heart/Dr. Kothari clinic for possible watchman.    Regarding her primary symptom of dyspnea with exertion, symptoms have been stable for several months.  We will obtain a transthoracic echocardiogram and refer to pulmonology (given her " many decades of smoking ) to better understand etiology of her symptoms.  Please see detailed problem based assessment / plan below    # A-fib, elevated [id number]  -Metoprolol succinate  -Apixaban  -Consider referral to structural heart/Dr. Kothari clinic in the future    # Dyspnea on exertion  -Metoprolol succinate  -Referral to pulmonology  -Transthoracic echocardiogram  -Smoking cessation    # Hypertension, well-controlled  -Continue amlodipine 10 mg  -Continue hydrochlorothiazide 25 mg    # PAD/dyslipidemia/elevated CAD risk  -Continue aspirin 81 mg  -Continue atorvastatin 10 mg, we discussed the need to escalate this dose  -- Patient has elected to hold off on escalation of her lipid-lowering agents at this time, which we will discuss again at our next visit    # Cardiovascular Health Maintenance  - Physical Activity: Encourage 10-15K steps per day  - Healthy Diet: Avoid high fat and high sodium foods (processed meats / fast foods)  --- consider a mediterranean or DASH diet, emphasizing vegetables, fruits, whole grains, lean proteins  - Avoidance of smoking and smoke exposure    Problem List Items Addressed This Visit    None  Visit Diagnoses         Codes    Dyspnea on exertion    -  Primary R06.09    Relevant Medications    metoprolol succinate XL (Toprol-XL) 50 mg 24 hr tablet    Other Relevant Orders    Transthoracic Echo (TTE) Complete    Referral to Pulmonology    Follow Up In Cardiology    New onset a-fib (Multi)     I48.91    Relevant Medications    metoprolol succinate XL (Toprol-XL) 50 mg 24 hr tablet    apixaban (Eliquis) 5 mg tablet    Other Relevant Orders    Transthoracic Echo (TTE) Complete    Follow Up In Cardiology                Time Spent: I spent 40 minutes reviewing medical testing, obtaining medical history and counselling and educating on diagnosis and documenting clinical encounter.   C. Barton Gillombardo, MD  Interventional Cardiology  Pager: [phone]

## 2025-02-07 PROBLEM — I73.9 PERIPHERAL VASCULAR DISEASE (CMS-HCC): Status: ACTIVE | Noted: 2025-02-07

## 2025-02-24 ENCOUNTER — APPOINTMENT (OUTPATIENT)
Dept: OPHTHALMOLOGY | Facility: CLINIC | Age: OVER 89
End: 2025-02-24
Payer: COMMERCIAL

## 2025-02-26 ENCOUNTER — TELEPHONE (OUTPATIENT)
Dept: RADIOLOGY | Facility: HOSPITAL | Age: OVER 89
End: 2025-02-26
Payer: COMMERCIAL

## 2025-02-26 DIAGNOSIS — E07.9 THYROID MASS: ICD-10-CM

## 2025-02-26 NOTE — TELEPHONE ENCOUNTER
Listed mobile phone number is also the phone number for Елена's son. Елена and her son live together. Discussed with Karthikeyan regarding radiology follow up recommendations based on imaging completed 2/3/2025. Discussed the thyroid and lung finding in which radiology recommends a thyroid ultrasound. Cardiology placed a referral for pulmonary related to shortness of breath that is not scheduled. Discussed that I will reach out for assistance with scheduling with a pulmonologist. Also discussed that I would message the provider that Елена saw back in 2015 when she had a thyroid ultrasound ordered.

## 2025-02-26 NOTE — TELEPHONE ENCOUNTER
Listed mobile phone number is also the phone number for Елена's son. Елена and her son live together. Discussed with Karthikeyan that the provider, Dr. Erica Fournier, can follow up regarding the thyroid finding. Discussed that a thyroid ultrasound order is awaiting her signature and I will call to get this scheduled once the order is signed. Once the thyroid ultrasound is scheduled we will then schedule a follow up visit with Dr. Erica Fournier.     At 1152 I received a secure chat from pulmonology scheduling, Phillip Clemons stating he spoke with Елена and at this time she doesn't want to schedule a visit with the pulmonologist.

## 2025-02-27 NOTE — TELEPHONE ENCOUNTER
Listed mobile phone number is also the phone number for Елена's son. Елена and her son live together. Thyroid ultrasound scheduled for 3/11/2025 with a 0930 arrival and 0945 testing at [address]. Discussed with Елена's son that there are no restrictions with eating/drinking. EPIC inbox message sent back to Dr. Erica Fournier's nurse, Susan Noe, KEESHA to assist with obtaining a follow up visit to review the ultrasound with Dr. Erica Fournier.

## 2025-03-05 ENCOUNTER — TELEPHONE (OUTPATIENT)
Dept: OTOLARYNGOLOGY | Facility: HOSPITAL | Age: OVER 89
End: 2025-03-05
Payer: COMMERCIAL

## 2025-03-05 NOTE — TELEPHONE ENCOUNTER
cAlled and left message to schedule an appointment with Dr. Fournier for her thyroid last seen 2015

## 2025-03-11 ENCOUNTER — HOSPITAL ENCOUNTER (OUTPATIENT)
Dept: RADIOLOGY | Facility: CLINIC | Age: OVER 89
Discharge: HOME | End: 2025-03-11
Payer: COMMERCIAL

## 2025-03-11 DIAGNOSIS — E07.9 THYROID MASS: ICD-10-CM

## 2025-03-11 PROCEDURE — 76536 US EXAM OF HEAD AND NECK: CPT

## 2025-03-13 ENCOUNTER — TELEPHONE (OUTPATIENT)
Dept: RADIOLOGY | Facility: HOSPITAL | Age: OVER 89
End: 2025-03-13
Payer: COMMERCIAL

## 2025-03-13 NOTE — TELEPHONE ENCOUNTER
Call to Елена's son, Karthikeyan. A follow up visit for Елена with Dr. Erica Fournier is needed so that recent thyroid ultrasound imaging on 3/11/2025 can be reviewed. Dr. Manley office has also attempted to contact for a follow up appointment. Left voice message. [employer] reminder message sent.

## 2025-03-17 ENCOUNTER — PATIENT MESSAGE (OUTPATIENT)
Dept: OTOLARYNGOLOGY | Facility: HOSPITAL | Age: OVER 89
End: 2025-03-17
Payer: COMMERCIAL

## 2025-03-24 ENCOUNTER — APPOINTMENT (OUTPATIENT)
Dept: OPHTHALMOLOGY | Facility: CLINIC | Age: OVER 89
End: 2025-03-24
Payer: COMMERCIAL

## 2025-03-24 DIAGNOSIS — H34.8110 CENTRAL RETINAL VEIN OCCLUSION WITH MACULAR EDEMA OF RIGHT EYE: Primary | ICD-10-CM

## 2025-03-24 PROCEDURE — 67028 INJECTION EYE DRUG: CPT | Mod: RIGHT SIDE | Performed by: OPHTHALMOLOGY

## 2025-03-24 PROCEDURE — 2023F DILAT RTA XM W/O RTNOPTHY: CPT | Performed by: OPHTHALMOLOGY

## 2025-03-24 PROCEDURE — 92134 CPTRZ OPH DX IMG PST SGM RTA: CPT | Performed by: OPHTHALMOLOGY

## 2025-03-24 PROCEDURE — 99213 OFFICE O/P EST LOW 20 MIN: CPT | Performed by: OPHTHALMOLOGY

## 2025-03-24 RX ADMIN — CIPROFLOXACIN HYDROCHLORIDE 1.25 MG: 500 TABLET ORAL at 11:00

## 2025-03-24 ASSESSMENT — VISUAL ACUITY
OS_SC: 20/40
OS_PH_SC: 20/30
OD_SC: 20/200
METHOD: SNELLEN - LINEAR

## 2025-03-24 ASSESSMENT — TONOMETRY
OS_IOP_MMHG: 18
IOP_METHOD: GOLDMANN APPLANATION
OD_IOP_MMHG: 18

## 2025-03-24 NOTE — PROGRESS NOTES
Imaging    Macula OCT 03/24/25  Right eye (OD): macular edema with thickening, IRF, stable vs previous  Left eye (OS): Normal contour and appearance, no IRF/subretinal fluid (SRF)      Assessment/Plan   Seen last 1/27/25    Diagnoses and all orders for this visit:  Central retinal vein occlusion with macular edema of right eye  89 YO F presented with complaint of right eye painless vision loss for the past 6 months. Presented to [facility] ED 11/2024 for evaluation but did not see ophthalmology at that time.   (+)med hx of CKD and Benign Essential Hypertension  -Seen initially on 1/27/25 - On DFE and MAC OCT extensive macular edema with hemorrhages noted right eye, noted decreased vision right eye about 6 months, had MIL #1 OD 1/27/25 for CRVO with ME  -Today, vision stable, macular edema appears stable on OCT without any significant improvement/response  -Patient did not some subjective improvement for period of time after last injection  -Recommend repeat MIL OD today, but will request authorization for alternative anti-VEGF given lack of response to Avastin.    -MIL #2 OD without difficulty today  -Please obtain insurance authorization for Eylea, Lucentis, or Vabysmo    Pseudophakia OU  Ocular HTN OU  -Being followed by Dr. Archer

## 2025-03-25 RX ORDER — CIPROFLOXACIN HYDROCHLORIDE 500 MG/1
1.25 TABLET ORAL
Status: COMPLETED | OUTPATIENT
Start: 2025-03-24 | End: 2025-03-24

## 2025-03-25 ASSESSMENT — EXTERNAL EXAM - LEFT EYE: OS_EXAM: NORMAL

## 2025-03-25 ASSESSMENT — CUP TO DISC RATIO
OS_RATIO: .3
OD_RATIO: .3

## 2025-03-25 ASSESSMENT — EXTERNAL EXAM - RIGHT EYE: OD_EXAM: NORMAL

## 2025-03-25 ASSESSMENT — SLIT LAMP EXAM - LIDS
COMMENTS: DERMATOCHALASIS, MGD
COMMENTS: DERMATOCHALASIS, MGD

## 2025-03-25 NOTE — PROGRESS NOTES
"Cancer follow up  TSH:   Lab Results   Component Value Date    TSH 3.66 01/30/2025      Chief Complaint   Patient presents with    Thyroid Problem     Goiter     HPI:  Елена Perez is a 89 y.o. female following up with me today for thyroid goiter. Last seen 2015. She did have a left hemithyroidectomy in the past for goiter and path +thyroiditis. Recent thyroid US showed an enlarged R thyroid lobe (8.9cm) and NODULE #: 1 Location: Right thyroid lobe superior aspect 1.1 x 0.6 x 0.8 cm TI-RADS category  3. NODULE #: 2 Right thyroid lobe deep inferior aspect Size: 2.4 x 1.3 x 1.6 cm TI-RADS category 4.  Prior right thyroid lobe was 8.4cm in 2015 so there hasn't been much growth in 10 yrs.  Prior thyroid nodule 1.4cm.    Social History  She reports that she has been smoking cigarettes. She has never used smokeless tobacco. She reports that she does not currently use alcohol. She reports that she does not use drugs.    ROS:  Review of Systems   Constitutional:  Negative for appetite change, chills, fatigue, fever and unexpected weight change.   HENT:  Negative for dental problem, drooling, ear pain, facial swelling, hearing loss, mouth sores, sore throat, tinnitus, trouble swallowing and voice change.    Respiratory:  Negative for cough, shortness of breath and stridor.    Gastrointestinal:  Negative for nausea and vomiting.   Musculoskeletal:  Negative for neck pain.   Hematological:  Negative for adenopathy.        PE:  ENT Physical Exam   CONSTITUTIONAL:  Vitals reviewed in nursing chart.  (Height 5'6\" weight 172lbs respirations 12)  CONSTITUTIONAL:  Well developed, well nourished.  Appears older.  CONSTITUTIONAL:  Normal ability to communicate.  Voice quality normal.  RESPIRATION:  Breathing comfortably, no stridor.  EYES:  Extraocular movement's intact, sclera normal.  NEURO:  Alert and oriented times 3.    PSYCH:  Mood and affect appropriate.  ENT - NOSE / EARS:  External inspection normal.  ENT - Lips / teeth / " gums:  No concerning lesions.  HEAD AND FACE:  Skin with no masses or lesions.    HEAD AND FACE:  Assessment of facial strength - CN VII symmetric and normal.  NECK:  Symmetric.  Not enlarged, thyroid incision from left hemithyroid    Procedures     ASSESSMENT AND PLAN:  Problem List Items Addressed This Visit       Hypothyroidism - Primary    Current Assessment & Plan     Well controled on synthroid  Continue daily synthroid         Nontoxic multinodular goiter    Current Assessment & Plan     Right multinodular goiter  Reviewed thyroid US from 3/25 and from 2015  Discussed that this hasn't enlarged much over the last 10 yrs (8.4 -> 8.9cm)  Reviewed thyroid nodules - discussed that we could proceed with US guided FNA   She would prefer observation  Discussed that we can continue observation with repeat thyroid US in 1 year  No compressive symptoms  Follow up with thyroid US in 1 year           Erica Fournier MD    Head & Neck Surgical Oncology & Reconstruction  Department of Otolaryngology - Head and Neck Surgery     By signing my name below, I, Susan Noe Scribe, attest that this documentation has been prepared under the direction and in the presence of Dr. Erica Fournier MD.     All medical record entries made by the Scribe were at my direction and personally dictated by me, Dr. Erica Fournier. I have reviewed the chart and agree that the record accurately reflects my personal performance of the history, physical exam, discussion and plan.

## 2025-04-02 ENCOUNTER — TELEMEDICINE (OUTPATIENT)
Dept: OTOLARYNGOLOGY | Facility: HOSPITAL | Age: OVER 89
End: 2025-04-02
Payer: COMMERCIAL

## 2025-04-02 DIAGNOSIS — E06.3 HYPOTHYROIDISM DUE TO HASHIMOTO THYROIDITIS: Primary | ICD-10-CM

## 2025-04-02 DIAGNOSIS — E04.2 NONTOXIC MULTINODULAR GOITER: ICD-10-CM

## 2025-04-02 PROCEDURE — 1159F MED LIST DOCD IN RCRD: CPT | Performed by: OTOLARYNGOLOGY

## 2025-04-02 PROCEDURE — 1160F RVW MEDS BY RX/DR IN RCRD: CPT | Performed by: OTOLARYNGOLOGY

## 2025-04-02 PROCEDURE — 99213 OFFICE O/P EST LOW 20 MIN: CPT | Performed by: OTOLARYNGOLOGY

## 2025-04-02 ASSESSMENT — ENCOUNTER SYMPTOMS
NECK PAIN: 0
VOMITING: 0
SORE THROAT: 0
SHORTNESS OF BREATH: 0
TROUBLE SWALLOWING: 0
FACIAL SWELLING: 0
NAUSEA: 0
ADENOPATHY: 0
FATIGUE: 0
APPETITE CHANGE: 0
COUGH: 0
VOICE CHANGE: 0
STRIDOR: 0
UNEXPECTED WEIGHT CHANGE: 0
FEVER: 0
CHILLS: 0

## 2025-04-02 NOTE — ASSESSMENT & PLAN NOTE
Right multinodular goiter  Reviewed thyroid US from 3/25 and from 2015  Discussed that this hasn't enlarged much over the last 10 yrs (8.4 -> 8.9cm)  Reviewed thyroid nodules - discussed that we could proceed with US guided FNA   She would prefer observation  Discussed that we can continue observation with repeat thyroid US in 1 year  No compressive symptoms  Follow up with thyroid US in 1 year

## 2025-04-20 ENCOUNTER — HOSPITAL ENCOUNTER (OUTPATIENT)
Facility: HOSPITAL | Age: OVER 89
Setting detail: OBSERVATION
Discharge: HOME | End: 2025-04-21
Attending: STUDENT IN AN ORGANIZED HEALTH CARE EDUCATION/TRAINING PROGRAM | Admitting: STUDENT IN AN ORGANIZED HEALTH CARE EDUCATION/TRAINING PROGRAM
Payer: COMMERCIAL

## 2025-04-20 ENCOUNTER — APPOINTMENT (OUTPATIENT)
Dept: RADIOLOGY | Facility: HOSPITAL | Age: OVER 89
End: 2025-04-20
Payer: COMMERCIAL

## 2025-04-20 ENCOUNTER — CLINICAL SUPPORT (OUTPATIENT)
Dept: EMERGENCY MEDICINE | Facility: HOSPITAL | Age: OVER 89
End: 2025-04-20
Payer: COMMERCIAL

## 2025-04-20 DIAGNOSIS — N17.9 AKI (ACUTE KIDNEY INJURY): ICD-10-CM

## 2025-04-20 DIAGNOSIS — I50.9 HEART FAILURE, UNSPECIFIED HF CHRONICITY, UNSPECIFIED HEART FAILURE TYPE: ICD-10-CM

## 2025-04-20 DIAGNOSIS — J96.01 ACUTE HYPOXIC RESPIRATORY FAILURE: ICD-10-CM

## 2025-04-20 DIAGNOSIS — I10 BENIGN ESSENTIAL HYPERTENSION: ICD-10-CM

## 2025-04-20 DIAGNOSIS — I50.9 CONGESTIVE HEART FAILURE, UNSPECIFIED HF CHRONICITY, UNSPECIFIED HEART FAILURE TYPE: Primary | ICD-10-CM

## 2025-04-20 DIAGNOSIS — E11.9 TYPE 2 DIABETES MELLITUS WITHOUT COMPLICATION, WITHOUT LONG-TERM CURRENT USE OF INSULIN: ICD-10-CM

## 2025-04-20 LAB
ALBUMIN SERPL BCP-MCNC: 4.3 G/DL (ref 3.4–5)
ALP SERPL-CCNC: 81 U/L (ref 33–136)
ALT SERPL W P-5'-P-CCNC: 8 U/L (ref 7–45)
ANION GAP BLDV CALCULATED.4IONS-SCNC: 11 MMOL/L (ref 10–25)
ANION GAP SERPL CALC-SCNC: 13 MMOL/L (ref 10–20)
AST SERPL W P-5'-P-CCNC: 17 U/L (ref 9–39)
BASE EXCESS BLDV CALC-SCNC: 0.5 MMOL/L (ref -2–3)
BASOPHILS # BLD AUTO: 0.02 X10*3/UL (ref 0–0.1)
BASOPHILS NFR BLD AUTO: 0.2 %
BILIRUB SERPL-MCNC: 0.5 MG/DL (ref 0–1.2)
BNP SERPL-MCNC: 184 PG/ML (ref 0–99)
BODY TEMPERATURE: 37 DEGREES CELSIUS
BUN SERPL-MCNC: 15 MG/DL (ref 6–23)
CA-I BLDV-SCNC: 1.23 MMOL/L (ref 1.1–1.33)
CALCIUM SERPL-MCNC: 9.7 MG/DL (ref 8.6–10.6)
CARDIAC TROPONIN I PNL SERPL HS: 6 NG/L (ref 0–34)
CARDIAC TROPONIN I PNL SERPL HS: 7 NG/L (ref 0–34)
CHLORIDE BLDV-SCNC: 101 MMOL/L (ref 98–107)
CHLORIDE SERPL-SCNC: 100 MMOL/L (ref 98–107)
CO2 SERPL-SCNC: 24 MMOL/L (ref 21–32)
CREAT SERPL-MCNC: 1.09 MG/DL (ref 0.5–1.05)
EGFRCR SERPLBLD CKD-EPI 2021: 49 ML/MIN/1.73M*2
EOSINOPHIL # BLD AUTO: 0.06 X10*3/UL (ref 0–0.4)
EOSINOPHIL NFR BLD AUTO: 0.6 %
ERYTHROCYTE [DISTWIDTH] IN BLOOD BY AUTOMATED COUNT: 15.9 % (ref 11.5–14.5)
EST. AVERAGE GLUCOSE BLD GHB EST-MCNC: 146 MG/DL
FLUAV RNA RESP QL NAA+PROBE: NOT DETECTED
FLUBV RNA RESP QL NAA+PROBE: NOT DETECTED
GLUCOSE BLD MANUAL STRIP-MCNC: 143 MG/DL (ref 74–99)
GLUCOSE BLD MANUAL STRIP-MCNC: 213 MG/DL (ref 74–99)
GLUCOSE BLDV-MCNC: 138 MG/DL (ref 74–99)
GLUCOSE SERPL-MCNC: 138 MG/DL (ref 74–99)
HBA1C MFR BLD: 6.7 % (ref ?–5.7)
HCO3 BLDV-SCNC: 27 MMOL/L (ref 22–26)
HCT VFR BLD AUTO: 37.8 % (ref 36–46)
HCT VFR BLD EST: 39 % (ref 36–46)
HGB BLD-MCNC: 12.6 G/DL (ref 12–16)
HGB BLDV-MCNC: 13 G/DL (ref 12–16)
HOLD SPECIMEN: NORMAL
IMM GRANULOCYTES # BLD AUTO: 0.08 X10*3/UL (ref 0–0.5)
IMM GRANULOCYTES NFR BLD AUTO: 0.8 % (ref 0–0.9)
INHALED O2 CONCENTRATION: 32 %
LACTATE BLDV-SCNC: 1.4 MMOL/L (ref 0.4–2)
LYMPHOCYTES # BLD AUTO: 4.69 X10*3/UL (ref 0.8–3)
LYMPHOCYTES NFR BLD AUTO: 44 %
MAGNESIUM SERPL-MCNC: 2.11 MG/DL (ref 1.6–2.4)
MCH RBC QN AUTO: 27.3 PG (ref 26–34)
MCHC RBC AUTO-ENTMCNC: 33.3 G/DL (ref 32–36)
MCV RBC AUTO: 82 FL (ref 80–100)
MONOCYTES # BLD AUTO: 0.76 X10*3/UL (ref 0.05–0.8)
MONOCYTES NFR BLD AUTO: 7.1 %
NEUTROPHILS # BLD AUTO: 5.04 X10*3/UL (ref 1.6–5.5)
NEUTROPHILS NFR BLD AUTO: 47.3 %
NRBC BLD-RTO: 0 /100 WBCS (ref 0–0)
OXYHGB MFR BLDV: 62.1 % (ref 45–75)
PCO2 BLDV: 50 MM HG (ref 41–51)
PH BLDV: 7.34 PH (ref 7.33–7.43)
PLATELET # BLD AUTO: 262 X10*3/UL (ref 150–450)
PO2 BLDV: 40 MM HG (ref 35–45)
POTASSIUM BLDV-SCNC: 3.5 MMOL/L (ref 3.5–5.3)
POTASSIUM SERPL-SCNC: 3.7 MMOL/L (ref 3.5–5.3)
PROT SERPL-MCNC: 8.2 G/DL (ref 6.4–8.2)
Q ONSET: 222 MS
QRS COUNT: 15 BEATS
QRS DURATION: 72 MS
QT INTERVAL: 342 MS
QTC CALCULATION(BAZETT): 427 MS
QTC FREDERICIA: 397 MS
R AXIS: -14 DEGREES
RBC # BLD AUTO: 4.61 X10*6/UL (ref 4–5.2)
RSV RNA RESP QL NAA+PROBE: NOT DETECTED
SAO2 % BLDV: 65 % (ref 45–75)
SARS-COV-2 RNA RESP QL NAA+PROBE: NOT DETECTED
SODIUM BLDV-SCNC: 135 MMOL/L (ref 136–145)
SODIUM SERPL-SCNC: 133 MMOL/L (ref 136–145)
T AXIS: 98 DEGREES
T OFFSET: 393 MS
VENTRICULAR RATE: 94 BPM
WBC # BLD AUTO: 10.7 X10*3/UL (ref 4.4–11.3)

## 2025-04-20 PROCEDURE — 83036 HEMOGLOBIN GLYCOSYLATED A1C: CPT

## 2025-04-20 PROCEDURE — G0378 HOSPITAL OBSERVATION PER HR: HCPCS

## 2025-04-20 PROCEDURE — 99285 EMERGENCY DEPT VISIT HI MDM: CPT | Mod: 25 | Performed by: STUDENT IN AN ORGANIZED HEALTH CARE EDUCATION/TRAINING PROGRAM

## 2025-04-20 PROCEDURE — 2500000005 HC RX 250 GENERAL PHARMACY W/O HCPCS

## 2025-04-20 PROCEDURE — 36415 COLL VENOUS BLD VENIPUNCTURE: CPT

## 2025-04-20 PROCEDURE — 71046 X-RAY EXAM CHEST 2 VIEWS: CPT

## 2025-04-20 PROCEDURE — 84484 ASSAY OF TROPONIN QUANT: CPT

## 2025-04-20 PROCEDURE — 93005 ELECTROCARDIOGRAM TRACING: CPT

## 2025-04-20 PROCEDURE — 87637 SARSCOV2&INF A&B&RSV AMP PRB: CPT

## 2025-04-20 PROCEDURE — 2500000002 HC RX 250 W HCPCS SELF ADMINISTERED DRUGS (ALT 637 FOR MEDICARE OP, ALT 636 FOR OP/ED)

## 2025-04-20 PROCEDURE — 1100000001 HC PRIVATE ROOM DAILY

## 2025-04-20 PROCEDURE — 93010 ELECTROCARDIOGRAM REPORT: CPT | Performed by: STUDENT IN AN ORGANIZED HEALTH CARE EDUCATION/TRAINING PROGRAM

## 2025-04-20 PROCEDURE — 2500000001 HC RX 250 WO HCPCS SELF ADMINISTERED DRUGS (ALT 637 FOR MEDICARE OP)

## 2025-04-20 PROCEDURE — 76604 US EXAM CHEST: CPT | Performed by: STUDENT IN AN ORGANIZED HEALTH CARE EDUCATION/TRAINING PROGRAM

## 2025-04-20 PROCEDURE — 84132 ASSAY OF SERUM POTASSIUM: CPT

## 2025-04-20 PROCEDURE — 96374 THER/PROPH/DIAG INJ IV PUSH: CPT

## 2025-04-20 PROCEDURE — 2500000004 HC RX 250 GENERAL PHARMACY W/ HCPCS (ALT 636 FOR OP/ED): Mod: JZ

## 2025-04-20 PROCEDURE — 82947 ASSAY GLUCOSE BLOOD QUANT: CPT

## 2025-04-20 PROCEDURE — 99285 EMERGENCY DEPT VISIT HI MDM: CPT | Performed by: STUDENT IN AN ORGANIZED HEALTH CARE EDUCATION/TRAINING PROGRAM

## 2025-04-20 PROCEDURE — 83735 ASSAY OF MAGNESIUM: CPT

## 2025-04-20 PROCEDURE — 85025 COMPLETE CBC W/AUTO DIFF WBC: CPT

## 2025-04-20 PROCEDURE — 71046 X-RAY EXAM CHEST 2 VIEWS: CPT | Mod: FOREIGN READ | Performed by: RADIOLOGY

## 2025-04-20 PROCEDURE — 83880 ASSAY OF NATRIURETIC PEPTIDE: CPT

## 2025-04-20 PROCEDURE — 76604 US EXAM CHEST: CPT

## 2025-04-20 RX ORDER — CHOLECALCIFEROL (VITAMIN D3) 25 MCG
125 TABLET ORAL DAILY
Status: DISCONTINUED | OUTPATIENT
Start: 2025-04-20 | End: 2025-04-21 | Stop reason: HOSPADM

## 2025-04-20 RX ORDER — METOPROLOL TARTRATE 25 MG/1
25 TABLET, FILM COATED ORAL 2 TIMES DAILY
Status: DISCONTINUED | OUTPATIENT
Start: 2025-04-20 | End: 2025-04-21 | Stop reason: HOSPADM

## 2025-04-20 RX ORDER — HYDROCHLOROTHIAZIDE 25 MG/1
25 TABLET ORAL ONCE
Status: COMPLETED | OUTPATIENT
Start: 2025-04-20 | End: 2025-04-20

## 2025-04-20 RX ORDER — ACETAMINOPHEN 160 MG/5ML
650 SOLUTION ORAL EVERY 4 HOURS PRN
Status: DISCONTINUED | OUTPATIENT
Start: 2025-04-20 | End: 2025-04-21 | Stop reason: HOSPADM

## 2025-04-20 RX ORDER — ONDANSETRON 4 MG/1
4 TABLET, FILM COATED ORAL EVERY 8 HOURS PRN
Status: DISCONTINUED | OUTPATIENT
Start: 2025-04-20 | End: 2025-04-21 | Stop reason: HOSPADM

## 2025-04-20 RX ORDER — DEXTROSE 50 % IN WATER (D50W) INTRAVENOUS SYRINGE
12.5
Status: DISCONTINUED | OUTPATIENT
Start: 2025-04-20 | End: 2025-04-21 | Stop reason: HOSPADM

## 2025-04-20 RX ORDER — ONDANSETRON HYDROCHLORIDE 2 MG/ML
4 INJECTION, SOLUTION INTRAVENOUS EVERY 8 HOURS PRN
Status: DISCONTINUED | OUTPATIENT
Start: 2025-04-20 | End: 2025-04-21 | Stop reason: HOSPADM

## 2025-04-20 RX ORDER — FLUTICASONE PROPIONATE 50 MCG
1 SPRAY, SUSPENSION (ML) NASAL DAILY
Status: DISCONTINUED | OUTPATIENT
Start: 2025-04-20 | End: 2025-04-21 | Stop reason: HOSPADM

## 2025-04-20 RX ORDER — POLYETHYLENE GLYCOL 3350 17 G/17G
17 POWDER, FOR SOLUTION ORAL DAILY PRN
Status: DISCONTINUED | OUTPATIENT
Start: 2025-04-20 | End: 2025-04-21 | Stop reason: HOSPADM

## 2025-04-20 RX ORDER — ATORVASTATIN CALCIUM 20 MG/1
10 TABLET, FILM COATED ORAL DAILY
Status: DISCONTINUED | OUTPATIENT
Start: 2025-04-20 | End: 2025-04-20

## 2025-04-20 RX ORDER — HYDRALAZINE HYDROCHLORIDE 10 MG/1
10 TABLET, FILM COATED ORAL 3 TIMES DAILY PRN
Status: DISCONTINUED | OUTPATIENT
Start: 2025-04-20 | End: 2025-04-21 | Stop reason: HOSPADM

## 2025-04-20 RX ORDER — HYDROCHLOROTHIAZIDE 25 MG/1
25 TABLET ORAL DAILY
Status: DISCONTINUED | OUTPATIENT
Start: 2025-04-21 | End: 2025-04-21 | Stop reason: HOSPADM

## 2025-04-20 RX ORDER — ACETAMINOPHEN 650 MG/1
650 SUPPOSITORY RECTAL EVERY 4 HOURS PRN
Status: DISCONTINUED | OUTPATIENT
Start: 2025-04-20 | End: 2025-04-21 | Stop reason: HOSPADM

## 2025-04-20 RX ORDER — METOPROLOL SUCCINATE 50 MG/1
50 TABLET, EXTENDED RELEASE ORAL DAILY
Status: DISCONTINUED | OUTPATIENT
Start: 2025-04-20 | End: 2025-04-20

## 2025-04-20 RX ORDER — METOPROLOL TARTRATE 50 MG/1
25 TABLET ORAL 2 TIMES DAILY
Status: DISCONTINUED | OUTPATIENT
Start: 2025-04-20 | End: 2025-04-20

## 2025-04-20 RX ORDER — AMLODIPINE BESYLATE 10 MG/1
10 TABLET ORAL DAILY
Status: DISCONTINUED | OUTPATIENT
Start: 2025-04-21 | End: 2025-04-21 | Stop reason: HOSPADM

## 2025-04-20 RX ORDER — ACETAMINOPHEN 325 MG/1
650 TABLET ORAL EVERY 4 HOURS PRN
Status: DISCONTINUED | OUTPATIENT
Start: 2025-04-20 | End: 2025-04-21 | Stop reason: HOSPADM

## 2025-04-20 RX ORDER — INSULIN LISPRO 100 [IU]/ML
0-5 INJECTION, SOLUTION INTRAVENOUS; SUBCUTANEOUS
Status: DISCONTINUED | OUTPATIENT
Start: 2025-04-20 | End: 2025-04-21 | Stop reason: HOSPADM

## 2025-04-20 RX ORDER — POTASSIUM CHLORIDE 1.5 G/1.58G
20 POWDER, FOR SOLUTION ORAL ONCE
Status: COMPLETED | OUTPATIENT
Start: 2025-04-20 | End: 2025-04-20

## 2025-04-20 RX ORDER — DEXTROSE 50 % IN WATER (D50W) INTRAVENOUS SYRINGE
25
Status: DISCONTINUED | OUTPATIENT
Start: 2025-04-20 | End: 2025-04-21 | Stop reason: HOSPADM

## 2025-04-20 RX ORDER — FUROSEMIDE 10 MG/ML
40 INJECTION INTRAMUSCULAR; INTRAVENOUS ONCE
Status: COMPLETED | OUTPATIENT
Start: 2025-04-20 | End: 2025-04-20

## 2025-04-20 RX ORDER — AMOXICILLIN 250 MG
1 CAPSULE ORAL DAILY PRN
Status: DISCONTINUED | OUTPATIENT
Start: 2025-04-20 | End: 2025-04-21 | Stop reason: HOSPADM

## 2025-04-20 RX ORDER — AMLODIPINE BESYLATE 10 MG/1
10 TABLET ORAL ONCE
Status: COMPLETED | OUTPATIENT
Start: 2025-04-20 | End: 2025-04-20

## 2025-04-20 RX ORDER — LEVOTHYROXINE SODIUM 125 UG/1
62.5 TABLET ORAL DAILY
Status: DISCONTINUED | OUTPATIENT
Start: 2025-04-20 | End: 2025-04-20

## 2025-04-20 RX ORDER — METOPROLOL TARTRATE 50 MG/1
25 TABLET ORAL ONCE
Status: COMPLETED | OUTPATIENT
Start: 2025-04-20 | End: 2025-04-20

## 2025-04-20 RX ORDER — TALC
3 POWDER (GRAM) TOPICAL NIGHTLY PRN
Status: DISCONTINUED | OUTPATIENT
Start: 2025-04-20 | End: 2025-04-21 | Stop reason: HOSPADM

## 2025-04-20 RX ORDER — NAPROXEN SODIUM 220 MG/1
81 TABLET, FILM COATED ORAL DAILY
Status: DISCONTINUED | OUTPATIENT
Start: 2025-04-20 | End: 2025-04-21 | Stop reason: HOSPADM

## 2025-04-20 RX ORDER — CYCLOBENZAPRINE HCL 10 MG
5 TABLET ORAL 3 TIMES DAILY PRN
Status: DISCONTINUED | OUTPATIENT
Start: 2025-04-20 | End: 2025-04-21 | Stop reason: HOSPADM

## 2025-04-20 RX ADMIN — APIXABAN 5 MG: 5 TABLET, FILM COATED ORAL at 08:50

## 2025-04-20 RX ADMIN — METOPROLOL TARTRATE 25 MG: 25 TABLET, FILM COATED ORAL at 20:24

## 2025-04-20 RX ADMIN — AMLODIPINE BESYLATE 10 MG: 10 TABLET ORAL at 05:49

## 2025-04-20 RX ADMIN — HYDROCHLOROTHIAZIDE 25 MG: 25 TABLET ORAL at 05:49

## 2025-04-20 RX ADMIN — ACETAMINOPHEN 650 MG: 325 TABLET, FILM COATED ORAL at 08:49

## 2025-04-20 RX ADMIN — LEVOTHYROXINE SODIUM 125 MCG: 0.12 TABLET ORAL at 08:56

## 2025-04-20 RX ADMIN — Medication 1 L/MIN: at 06:00

## 2025-04-20 RX ADMIN — POTASSIUM CHLORIDE 20 MEQ: 1.5 POWDER, FOR SOLUTION ORAL at 10:12

## 2025-04-20 RX ADMIN — FUROSEMIDE 40 MG: 10 INJECTION, SOLUTION INTRAMUSCULAR; INTRAVENOUS at 05:50

## 2025-04-20 RX ADMIN — CYCLOBENZAPRINE 5 MG: 10 TABLET, FILM COATED ORAL at 08:49

## 2025-04-20 RX ADMIN — Medication 2 L/MIN: at 03:55

## 2025-04-20 RX ADMIN — Medication 1 L/MIN: at 07:00

## 2025-04-20 RX ADMIN — METOPROLOL TARTRATE 25 MG: 50 TABLET, FILM COATED ORAL at 05:49

## 2025-04-20 RX ADMIN — APIXABAN 5 MG: 5 TABLET, FILM COATED ORAL at 20:24

## 2025-04-20 RX ADMIN — CHOLECALCIFEROL TAB 125 MCG (5000 UNIT) 125 MCG: 125 TAB at 08:52

## 2025-04-20 SDOH — ECONOMIC STABILITY: FOOD INSECURITY: WITHIN THE PAST 12 MONTHS, THE FOOD YOU BOUGHT JUST DIDN'T LAST AND YOU DIDN'T HAVE MONEY TO GET MORE.: NEVER TRUE

## 2025-04-20 SDOH — HEALTH STABILITY: PHYSICAL HEALTH
HOW OFTEN DO YOU NEED TO HAVE SOMEONE HELP YOU WHEN YOU READ INSTRUCTIONS, PAMPHLETS, OR OTHER WRITTEN MATERIAL FROM YOUR DOCTOR OR PHARMACY?: SOMETIMES

## 2025-04-20 SDOH — SOCIAL STABILITY: SOCIAL INSECURITY: WITHIN THE LAST YEAR, HAVE YOU BEEN HUMILIATED OR EMOTIONALLY ABUSED IN OTHER WAYS BY YOUR PARTNER OR EX-PARTNER?: NO

## 2025-04-20 SDOH — ECONOMIC STABILITY: FOOD INSECURITY: WITHIN THE PAST 12 MONTHS, YOU WORRIED THAT YOUR FOOD WOULD RUN OUT BEFORE YOU GOT THE MONEY TO BUY MORE.: NEVER TRUE

## 2025-04-20 SDOH — ECONOMIC STABILITY: FOOD INSECURITY: HOW HARD IS IT FOR YOU TO PAY FOR THE VERY BASICS LIKE FOOD, HOUSING, MEDICAL CARE, AND HEATING?: NOT HARD AT ALL

## 2025-04-20 SDOH — SOCIAL STABILITY: SOCIAL NETWORK
IN A TYPICAL WEEK, HOW MANY TIMES DO YOU TALK ON THE PHONE WITH FAMILY, FRIENDS, OR NEIGHBORS?: MORE THAN THREE TIMES A WEEK

## 2025-04-20 SDOH — ECONOMIC STABILITY: HOUSING INSECURITY: AT ANY TIME IN THE PAST 12 MONTHS, WERE YOU HOMELESS OR LIVING IN A SHELTER (INCLUDING NOW)?: NO

## 2025-04-20 SDOH — SOCIAL STABILITY: SOCIAL INSECURITY: HAVE YOU HAD ANY THOUGHTS OF HARMING ANYONE ELSE?: NO

## 2025-04-20 SDOH — SOCIAL STABILITY: SOCIAL INSECURITY
WITHIN THE LAST YEAR, HAVE YOU BEEN KICKED, HIT, SLAPPED, OR OTHERWISE PHYSICALLY HURT BY YOUR PARTNER OR EX-PARTNER?: NO

## 2025-04-20 SDOH — SOCIAL STABILITY: SOCIAL INSECURITY: ARE YOU MARRIED, WIDOWED, DIVORCED, SEPARATED, NEVER MARRIED, OR LIVING WITH A PARTNER?: WIDOWED

## 2025-04-20 SDOH — ECONOMIC STABILITY: HOUSING INSECURITY: IN THE PAST 12 MONTHS, HOW MANY TIMES HAVE YOU MOVED WHERE YOU WERE LIVING?: 0

## 2025-04-20 SDOH — SOCIAL STABILITY: SOCIAL INSECURITY
WITHIN THE LAST YEAR, HAVE YOU BEEN RAPED OR FORCED TO HAVE ANY KIND OF SEXUAL ACTIVITY BY YOUR PARTNER OR EX-PARTNER?: NO

## 2025-04-20 SDOH — SOCIAL STABILITY: SOCIAL NETWORK: HOW OFTEN DO YOU GET TOGETHER WITH FRIENDS OR RELATIVES?: MORE THAN THREE TIMES A WEEK

## 2025-04-20 SDOH — SOCIAL STABILITY: SOCIAL NETWORK
DO YOU BELONG TO ANY CLUBS OR ORGANIZATIONS SUCH AS CHURCH GROUPS, UNIONS, FRATERNAL OR ATHLETIC GROUPS, OR SCHOOL GROUPS?: NO

## 2025-04-20 SDOH — SOCIAL STABILITY: SOCIAL INSECURITY: WITHIN THE LAST YEAR, HAVE YOU BEEN AFRAID OF YOUR PARTNER OR EX-PARTNER?: NO

## 2025-04-20 SDOH — ECONOMIC STABILITY: HOUSING INSECURITY: IN THE LAST 12 MONTHS, WAS THERE A TIME WHEN YOU WERE NOT ABLE TO PAY THE MORTGAGE OR RENT ON TIME?: NO

## 2025-04-20 SDOH — SOCIAL STABILITY: SOCIAL INSECURITY: HAVE YOU HAD THOUGHTS OF HARMING ANYONE ELSE?: NO

## 2025-04-20 SDOH — SOCIAL STABILITY: SOCIAL INSECURITY: ABUSE: ADULT

## 2025-04-20 SDOH — SOCIAL STABILITY: SOCIAL INSECURITY: DOES ANYONE TRY TO KEEP YOU FROM HAVING/CONTACTING OTHER FRIENDS OR DOING THINGS OUTSIDE YOUR HOME?: NO

## 2025-04-20 SDOH — ECONOMIC STABILITY: INCOME INSECURITY: IN THE PAST 12 MONTHS HAS THE ELECTRIC, GAS, OIL, OR WATER COMPANY THREATENED TO SHUT OFF SERVICES IN YOUR HOME?: NO

## 2025-04-20 SDOH — SOCIAL STABILITY: SOCIAL NETWORK: HOW OFTEN DO YOU ATTEND MEETINGS OF THE CLUBS OR ORGANIZATIONS YOU BELONG TO?: NEVER

## 2025-04-20 SDOH — HEALTH STABILITY: PHYSICAL HEALTH: ON AVERAGE, HOW MANY DAYS PER WEEK DO YOU ENGAGE IN MODERATE TO STRENUOUS EXERCISE (LIKE A BRISK WALK)?: 0 DAYS

## 2025-04-20 SDOH — SOCIAL STABILITY: SOCIAL NETWORK: HOW OFTEN DO YOU ATTEND CHURCH OR RELIGIOUS SERVICES?: MORE THAN 4 TIMES PER YEAR

## 2025-04-20 SDOH — HEALTH STABILITY: MENTAL HEALTH
DO YOU FEEL STRESS - TENSE, RESTLESS, NERVOUS, OR ANXIOUS, OR UNABLE TO SLEEP AT NIGHT BECAUSE YOUR MIND IS TROUBLED ALL THE TIME - THESE DAYS?: NOT AT ALL

## 2025-04-20 SDOH — HEALTH STABILITY: PHYSICAL HEALTH: ON AVERAGE, HOW MANY MINUTES DO YOU ENGAGE IN EXERCISE AT THIS LEVEL?: 0 MIN

## 2025-04-20 SDOH — SOCIAL STABILITY: SOCIAL INSECURITY: DO YOU FEEL UNSAFE GOING BACK TO THE PLACE WHERE YOU ARE LIVING?: NO

## 2025-04-20 SDOH — SOCIAL STABILITY: SOCIAL INSECURITY: DO YOU FEEL ANYONE HAS EXPLOITED OR TAKEN ADVANTAGE OF YOU FINANCIALLY OR OF YOUR PERSONAL PROPERTY?: NO

## 2025-04-20 SDOH — SOCIAL STABILITY: SOCIAL INSECURITY: ARE YOU OR HAVE YOU BEEN THREATENED OR ABUSED PHYSICALLY, EMOTIONALLY, OR SEXUALLY BY ANYONE?: NO

## 2025-04-20 SDOH — SOCIAL STABILITY: SOCIAL INSECURITY: ARE THERE ANY APPARENT SIGNS OF INJURIES/BEHAVIORS THAT COULD BE RELATED TO ABUSE/NEGLECT?: NO

## 2025-04-20 SDOH — ECONOMIC STABILITY: TRANSPORTATION INSECURITY: IN THE PAST 12 MONTHS, HAS LACK OF TRANSPORTATION KEPT YOU FROM MEDICAL APPOINTMENTS OR FROM GETTING MEDICATIONS?: NO

## 2025-04-20 SDOH — SOCIAL STABILITY: SOCIAL INSECURITY: WERE YOU ABLE TO COMPLETE ALL THE BEHAVIORAL HEALTH SCREENINGS?: YES

## 2025-04-20 SDOH — SOCIAL STABILITY: SOCIAL INSECURITY: HAS ANYONE EVER THREATENED TO HURT YOUR FAMILY OR YOUR PETS?: NO

## 2025-04-20 ASSESSMENT — COGNITIVE AND FUNCTIONAL STATUS - GENERAL
MOBILITY SCORE: 24
PATIENT BASELINE BEDBOUND: NO
DAILY ACTIVITIY SCORE: 24

## 2025-04-20 ASSESSMENT — ACTIVITIES OF DAILY LIVING (ADL)
GROOMING: INDEPENDENT
PATIENT'S MEMORY ADEQUATE TO SAFELY COMPLETE DAILY ACTIVITIES?: YES
ADEQUATE_TO_COMPLETE_ADL: YES
JUDGMENT_ADEQUATE_SAFELY_COMPLETE_DAILY_ACTIVITIES: YES
BATHING: NEEDS ASSISTANCE
TOILETING: INDEPENDENT
WALKS IN HOME: INDEPENDENT
BATHING: NEEDS ASSISTANCE
LACK_OF_TRANSPORTATION: NO
FEEDING YOURSELF: INDEPENDENT
DRESSING YOURSELF: INDEPENDENT
HEARING - LEFT EAR: HEARING AID
HEARING - RIGHT EAR: DIFFICULTY WITH NOISE
BATHING: NEEDS ASSISTANCE

## 2025-04-20 ASSESSMENT — COLUMBIA-SUICIDE SEVERITY RATING SCALE - C-SSRS
6. HAVE YOU EVER DONE ANYTHING, STARTED TO DO ANYTHING, OR PREPARED TO DO ANYTHING TO END YOUR LIFE?: NO
1. IN THE PAST MONTH, HAVE YOU WISHED YOU WERE DEAD OR WISHED YOU COULD GO TO SLEEP AND NOT WAKE UP?: NO
2. HAVE YOU ACTUALLY HAD ANY THOUGHTS OF KILLING YOURSELF?: NO
1. IN THE PAST MONTH, HAVE YOU WISHED YOU WERE DEAD OR WISHED YOU COULD GO TO SLEEP AND NOT WAKE UP?: NO
2. HAVE YOU ACTUALLY HAD ANY THOUGHTS OF KILLING YOURSELF?: NO
6. HAVE YOU EVER DONE ANYTHING, STARTED TO DO ANYTHING, OR PREPARED TO DO ANYTHING TO END YOUR LIFE?: NO

## 2025-04-20 ASSESSMENT — PAIN SCALES - GENERAL
PAINLEVEL_OUTOF10: 0 - NO PAIN
PAINLEVEL_OUTOF10: 7
PAINLEVEL_OUTOF10: 0 - NO PAIN

## 2025-04-20 ASSESSMENT — ENCOUNTER SYMPTOMS
CHILLS: 0
SHORTNESS OF BREATH: 1
ABDOMINAL PAIN: 0
PALPITATIONS: 0
DIARRHEA: 0
DYSURIA: 0
CONSTIPATION: 0
COUGH: 0
BLOOD IN STOOL: 0
VOMITING: 0
LIGHT-HEADEDNESS: 0
NAUSEA: 0
WEAKNESS: 0
FEVER: 0
RHINORRHEA: 0

## 2025-04-20 ASSESSMENT — PAIN - FUNCTIONAL ASSESSMENT
PAIN_FUNCTIONAL_ASSESSMENT: 0-10
PAIN_FUNCTIONAL_ASSESSMENT: 0-10

## 2025-04-20 ASSESSMENT — LIFESTYLE VARIABLES
AUDIT-C TOTAL SCORE: 1
HOW OFTEN DO YOU HAVE A DRINK CONTAINING ALCOHOL: MONTHLY OR LESS
AUDIT-C TOTAL SCORE: 1
HOW OFTEN DO YOU HAVE 6 OR MORE DRINKS ON ONE OCCASION: NEVER
SUBSTANCE_ABUSE_PAST_12_MONTHS: NO
HOW MANY STANDARD DRINKS CONTAINING ALCOHOL DO YOU HAVE ON A TYPICAL DAY: PATIENT DOES NOT DRINK
SKIP TO QUESTIONS 9-10: 1
PRESCIPTION_ABUSE_PAST_12_MONTHS: NO

## 2025-04-20 ASSESSMENT — PATIENT HEALTH QUESTIONNAIRE - PHQ9
SUM OF ALL RESPONSES TO PHQ9 QUESTIONS 1 & 2: 0
1. LITTLE INTEREST OR PLEASURE IN DOING THINGS: NOT AT ALL
2. FEELING DOWN, DEPRESSED OR HOPELESS: NOT AT ALL

## 2025-04-20 ASSESSMENT — PAIN DESCRIPTION - ORIENTATION: ORIENTATION: RIGHT;LEFT

## 2025-04-20 ASSESSMENT — PAIN SCALES - WONG BAKER: WONGBAKER_NUMERICALRESPONSE: HURTS WHOLE LOT

## 2025-04-20 ASSESSMENT — PAIN DESCRIPTION - LOCATION: LOCATION: LEG

## 2025-04-20 NOTE — ED PROVIDER NOTES
Emergency Department Provider Note        History of Present Illness     History provided by: Patient  Limitations to History: None  External Records Reviewed with Brief Summary: Outpatient progress note from cardiology on 2025 which showed outpatient visit for follow-up for atrial fibrillation, plan for TTE which was not completed    HPI:  Елена Perez is a 89 y.o. female with PMHx A-fib on Eliquis, HTN, T2DM with acute onset of shortness of breath after trip to bathroom tonight.  Her son called EMS and on ECFE arrival patient was reportedly saturating 86% on RA, was given 2 DuoNebs with improvement to 97%.  Reports no chest pain, no fevers/chills, no cough/congestion.  No leg swelling, no history of blood clots.    Physical Exam   Triage vitals:  T 36.1 °C (97 °F)  HR 83  BP (!) 159/109  RR 20  O2 97 % Supplemental oxygen    Physical exam:   Triage vitals reviewed.  Constitutional: Well developed adult in no acute distress, non toxic in appearance  Head: Normocephalic, atraumatic  Skin: Intact, dry. No rashes or lesions.  Eyes: Pupils are equal. No conjunctival injection.  Neck: Supple. Trachea is midline.  Pulmonary: SpO2 97% on 2L NC.  Normal work of breathing with no accessory muscle use noted.  Bilateral crackles and fair air movement, no wheezes.  Cardiovascular: Normal rate, regular rhythm. No murmurs/gallops/rubs appreciated. 2+ radial, PT, DP pulses bilaterally.  No pedal edema.  Abdomen: Soft, nondistended. Nontender to palpation.  Extremities: No gross deformities.  Moving all extremities spontaneously without difficulty.  Neuro: Awake and alert. Face is symmetric. Speech is clear. No obvious focal findings.      Medical Decision Making & ED Course   Medical Decision Makin y.o. female with PMHx A-fib on Eliquis, HTN, T2DM who presents emergency department with shortness of breath.  On arrival she was afebrile, hypertensive at 159/109, otherwise hemodynamically stable and saturating 97% on  room air on arrival, started on 2L NC for comfort with improvement in her work of breathing.  EKG with atrial fibrillation with PVCs, no evidence of ischemia.  POCUS significant for bilateral B-lines and grossly reduced EF on parasternal long axis view.  CXR without pneumonia but noted to have cardiomegaly.  BNP elevated, which consistent with new onset heart failure.  Patient also noted to have BIANCA.  Lab work otherwise unremarkable.  Low concern for PE with no O2 requirement and patient on blood thinners, Wells score of 0.    Considered SCAPE, however patient does not have significantly increased work of breathing.  Will treat with home antihypertensives and diuresis for suspected new onset CHF.    Discussed with the patient and recommended admission for further workup including formal echo.  Discussed with admissions coordinator and patient admitted to medicine in stable condition.  ----    Differential diagnoses considered include but are not limited to: New onset heart failure, SCAPE, ADHF, bronchitis, PNA, ACS, A-fib with RVR     Social Determinants of Health which Significantly Impact Care: None identified     EKG Independent Interpretation: EKG interpreted by myself. Please see ED Course for full interpretation.    Independent Result Review and Interpretation: Relevant laboratory and radiographic results were reviewed and independently interpreted by myself.  As necessary, they are commented on in the ED Course.    Chronic conditions affecting the patient's care: As documented above in [facility]    The patient was discussed with the following consultants/services: Admission Coordinator who accepted the patient for admission    Care Considerations: As documented above in [facility]    ED Course:  ED Course as of 04/20/25 0651   Sun Apr 20, 2025   0368 Attending summary:  89 y/o F with PMHx HTN, HLD, DM, afib on eliquis who is presenting for dyspnea. Started tonight after walking from the bathroom. Not sure if its worse  flat or w/ exertion. Felt at baseline state of health when went to bed. No fevers, cough, recent illnesses. No leg swelling or pain, weight gain. Compliant with eliquis. No hx of VTE, recent immobilization. No chest pain, diaphoresis. On arrival, hypertensive with vitals otherwise unremarkable. Exam shows nontoxic elderly female with mild tachypnea to low 20s, no accessory muscle use, bilateral basilar crackles w/ good air movement, soft nontender abdomen, no LE tenderness or edema. Bedside US with B lines bilaterally. Highest suspicion for new onset CHF. SCAPE/flash pulm edema considered however pt not in respiratory distress and not that hypertensive so do not believe she needs nitroglycerin or NIPPV. No asthma/COPD hx. Compliant with eliquis, no other vital sign abnormalities concerning for PE. Will get labs, CXR and give home AM antihypertensives and IV lasix. Will need admission for TTE and goal-directed therapy if shows new onset CHF.  [SS]   0426 B-lines bilaterally on POCUS, no obvious pleural effusion.  Parasternal long axis view of the heart with no evidence of pericardial effusion, notable for decreased EF. [HH]   0459 ECG 12 lead  EKG obtained at 03 44 and interpreted by me: 94 bpm, atrial fibrillation with occasional PVC.  Narrow QRS, no QTc prolongation.  No ST elevations, depressions, or T wave inversions concerning for ischemia.Compared with prior EKG from 2/3/2025, atrial fibrillation with RVR replaced by atrial fibrillation, ST depressions in anterior leads are now absent [HH]   0503 XR chest 2 views  CXR independently reviewed without evidence of PTX, consolidation, or widened mediastinum.  There is blunting of the costophrenic angles bilaterally [HH]      ED Course User Index  [HH] Maryam Cottrell MD  [SS] Neida Thurston MD         Diagnoses as of 04/20/25 0651   Congestive heart failure, unspecified HF chronicity, unspecified heart failure type   BIANCA (acute kidney injury)     Disposition    As a result of their workup, the patient will require admission to the hospital.  The patient was informed of her diagnosis.  The patient was given the opportunity to ask questions and I answered them. The patient agreed to be admitted to the hospital.      Patient seen and discussed with ED attending physician.    Maryam Cottrell MD  Emergency Medicine     Maryam Cottrell MD  Resident  04/20/25 06

## 2025-04-20 NOTE — ED PROCEDURE NOTE
Procedure    Performed by: Maryam Cottrell MD  Authorized by: Neida Thurston MD    Cardiac Indications: orthopnea dyspnea                  Procedure: Thoracic Ultrasound    Findings:  R Lung Sliding: The RIGHT chest was evaluated and LUNG SLIDING was visualized.  L Lung Sliding: The LEFT chest was evaluated and LUNG SLIDING was visualized.  R Effusion: The RIGHT chest was evaluated and there was NO PLEURAL EFFUSION.  L Effusion: The LEFT chest was evaluated and there was NO PLEURAL EFFUSION.    B-lines:   right B-lines present and   left B-lines present  R Consolidation: The RIGHT chest was evaluated and there was NO RIGHT CONSOLIDATION.  L Consolidation: The LEFT chest was evaluated and there was NO LEFT CONSOLIDATION.    Impression:  Thorax: The focused thoracic ultrasound exam had ABNORMAL findings as specified.        Maryam Cottrell MD  Emergency Medicine PGY2  [facility]            Maryam Cottrell MD  Resident  04/20/25 3702

## 2025-04-20 NOTE — CARE PLAN
The patient's goals for the shift include get some rest    The clinical goals for the shift include keep oxygen above 94%      Problem: Discharge Planning  Goal: Discharge to home or other facility with appropriate resources  Outcome: Progressing     Problem: Chronic Conditions and Co-morbidities  Goal: Patient's chronic conditions and co-morbidity symptoms are monitored and maintained or improved  Outcome: Progressing

## 2025-04-20 NOTE — PROGRESS NOTES
Pharmacy Medication History Review    Елена Perez is a 89 y.o. female admitted for Congestive heart failure, unspecified HF chronicity, unspecified heart failure type. Pharmacy reviewed the patient's [id number] medications and allergies for accuracy.    Medications ADDED:  None   Medications CHANGED:  Flonase updated to PRN  Medications REMOVED:   None      The list below reflects the updated PTA list.   Prior to Admission Medications   Prescriptions Last Dose Informant   acetaminophen (Tylenol) 325 mg tablet  Self   Sig: Take 1-2 tablets (325-650 mg) by mouth every 4 hours if needed for mild pain (1 - 3).   amLODIPine (Norvasc) 10 mg tablet 2025 Self   Sig: Take 1 tablet (10 mg) by mouth once daily.   apixaban (Eliquis) 5 mg tablet 2025 Self   Sig: Take 1 tablet (5 mg) by mouth 2 times a day.   aspirin 81 mg chewable tablet Not Taking Self   Sig: Chew 1 tablet (81 mg) once daily.   Patient not taking: Reported on 2025   atorvastatin (Lipitor) 10 mg tablet Not Taking    Sig: Take 1 tablet (10 mg) by mouth once daily for 20 days.   Patient not taking: Reported on 2025   cholecalciferol (Vitamin D-3) 5,000 Units tablet 2025 Self   Sig: Take 1 tablet (125 mcg) by mouth once daily.   cyclobenzaprine (Flexeril) 5 mg tablet  Self   Si tab by mouth for stiffness , as needed   fluticasone (Flonase) 50 mcg/actuation nasal spray  Self   Sig: Administer 1 spray into each nostril once daily. Shake gently. Before first use, prime pump. After use, clean tip and replace cap.   Patient taking differently: Patient uses as needed.   hydroCHLOROthiazide (HYDRODiuril) 25 mg tablet 2025 Self   Sig: Take 1 tablet (25 mg) by mouth once daily.   levothyroxine (Synthroid, Levoxyl) 125 mcg tablet 2025 Self   Sig: Take 0.5 tablet by mouth every day x6 days, and 1 full tablet on day7   metoprolol succinate XL (Toprol-XL) 50 mg 24 hr tablet 2025 Self   Sig: Take 1 tablet (50 mg) by mouth  "once daily. Do not crush or chew.   sennosides-docusate sodium (Lindsey-Colace) 8.6-50 mg tablet Past Week Self   Sig: Take 1 tablet by mouth once daily as needed.      Facility-Administered Medications: None        The list below reflects the updated allergy list. Please review each documented allergy for additional clarification and justification.  Allergies  Reviewed by Myriam You on 4/20/2025        Severity Reactions Comments    Lisinopril Not Specified Swelling Patient son stated that after taking this medication patient feet became swollen.            Patient accepts M2B at discharge.     Sources:   [facility]  Pharmacy dispense history  Patient Interview Moderate historian  Chart Review  Care Everywhere  Son at bedside. He helped with [facility].      Additional Comments:  Patient son stated when patient took Atorvastatin 10 mg her feet became swollen. Patient has not taken any of this medication since February.      Myriam You  Pharmacy Technician  04/20/25     Secure Chat preferred   If no response call [phone] or [facility] \"[facility]\"   "

## 2025-04-20 NOTE — H&P
"History Of Present Illness  Елена Perez is a 89 y.o. female presenting with 89-year-old female with PMHx A-fib on Eliquis, HTN, T2DM who presents the emergency department for shortness of breath.  History obtained by patient and complemented by patient's son Karthikeyan who is at bedside.  Patient reports she woke up overnight around 2 to 3 AM to use the restroom and as she was walking she felt her breathing was labored, tried to sit down in her chair but was still short of breath and so she called her son who lives downstairs who called EMS.  On ECFE arrival patient was reportedly saturating 86% on RA, was given 2 DuoNebs with improvement to 97%.  Reports sinus congestion that resolves with her home Flonase but no other URI symptoms.  Patient denies chest pain, palpitations, dizziness, fall, syncope, fevers/chills, leg swelling. No history of blood clots and patient report being compliant with all her medications including the Eliquis except for Lipitor which she stopped because it caused \"leg swelling\".  Denies any bleeding on the Eliquis.  Denies loss of appetite, decreased p.o. intake, nausea, vomiting, abdominal pain, diarrhea.  Reports constipation.  No asthma or COPD history although patient has been a smoker for many years. Denies similar episodes of shortness of breath in the past. She also reports that she fell once a couple months ago which was mechanical as she slipped.  Discussed PT at SNF with patient who reports would be interested in home care PT but not SNF.    In ED patient initially hypertensive 159/109 and requiring 3 L NC but otherwise HDS.  CBC normal.  CMP with hyponatremia 133, mildly elevated creatinine 1.09 (bl 0.96-0.98), troponin 0, 1 negative.  BNP elevated to 184 (no prior to compare).  VBG normal pH 7.34/normal pCO2 50/normal lactate 1.4.  Flu/COVID swabs negative.  CXR in the ED with cardiomegaly and mild coronary edema likely CHF.  Bedside POCUS with bilateral B-lines and no " "consolidation.  Patient was given home antihypertensives including amlodipine 10 mg, metoprolol tartrate 25 mg, hydrochlorothiazide 25 mg, and one-time IV Lasix 40 mg.      Past Medical History  Medical History[1]    Surgical History  Surgical History[2]     Social History  She reports that she has been smoking cigarettes. She has never used smokeless tobacco. She reports that she does not currently use alcohol. She reports that she does not use drugs.    Family History  Family History[3]     Allergies  Patient has no known allergies.    Review of Systems   Constitutional:  Negative for chills and fever.   HENT:  Positive for congestion and hearing loss. Negative for rhinorrhea.    Respiratory:  Positive for shortness of breath. Negative for cough.    Cardiovascular:  Negative for chest pain, palpitations and leg swelling.   Gastrointestinal:  Negative for abdominal pain, blood in stool, constipation, diarrhea, nausea and vomiting.   Genitourinary:  Negative for dysuria.   Neurological:  Negative for syncope, weakness and light-headedness.          Physical Exam  General: well-appearing, NAD  HEENT: NC/AT, [place] wearing hearing aids  Cardiac: rrr, no m/r/g  Lungs: normal work of breathing, decreased breath sounds bilaterally, on 1 L NC.  Abdomen: soft, non-tender, non-distended, BS present  Neuro: grossly intact  MSK: No peripheral edema, cyanosis, or pallor  Psych: no depression, anxiety       Last Recorded Vitals  Blood pressure 135/85, pulse 65, temperature 36.1 °C (97 °F), temperature source Temporal, resp. rate 17, height 1.676 m (5' 6\"), weight 78 kg (172 lb), SpO2 96%.    Relevant Results  Scheduled medications  Scheduled Medications[4]  Continuous medications  Continuous Medications[5]  PRN medications  PRN Medications[6]    Results for orders placed or performed during the hospital encounter of 04/20/25 (from the past 24 hours)   CBC and Auto Differential   Result Value Ref Range    WBC 10.7 4.4 - 11.3 " x10*3/uL    nRBC 0.0 0.0 - 0.0 /100 WBCs    RBC 4.61 4.00 - 5.20 x10*6/uL    Hemoglobin 12.6 12.0 - 16.0 g/dL    Hematocrit 37.8 36.0 - 46.0 %    MCV 82 80 - 100 fL    MCH 27.3 26.0 - 34.0 pg    MCHC 33.3 32.0 - 36.0 g/dL    RDW 15.9 (H) 11.5 - 14.5 %    Platelets 262 150 - 450 x10*3/uL    Neutrophils % 47.3 40.0 - 80.0 %    Immature Granulocytes %, Automated 0.8 0.0 - 0.9 %    Lymphocytes % 44.0 13.0 - 44.0 %    Monocytes % 7.1 2.0 - 10.0 %    Eosinophils % 0.6 0.0 - 6.0 %    Basophils % 0.2 0.0 - 2.0 %    Neutrophils Absolute 5.04 1.60 - 5.50 x10*3/uL    Immature Granulocytes Absolute, Automated 0.08 0.00 - 0.50 x10*3/uL    Lymphocytes Absolute 4.69 (H) 0.80 - 3.00 x10*3/uL    Monocytes Absolute 0.76 0.05 - 0.80 x10*3/uL    Eosinophils Absolute 0.06 0.00 - 0.40 x10*3/uL    Basophils Absolute 0.02 0.00 - 0.10 x10*3/uL   Comprehensive metabolic panel   Result Value Ref Range    Glucose 138 (H) 74 - 99 mg/dL    Sodium 133 (L) 136 - 145 mmol/L    Potassium 3.7 3.5 - 5.3 mmol/L    Chloride 100 98 - 107 mmol/L    Bicarbonate 24 21 - 32 mmol/L    Anion Gap 13 10 - 20 mmol/L    Urea Nitrogen 15 6 - 23 mg/dL    Creatinine 1.09 (H) 0.50 - 1.05 mg/dL    eGFR 49 (L) >60 mL/min/1.73m*2    Calcium 9.7 8.6 - 10.6 mg/dL    Albumin 4.3 3.4 - 5.0 g/dL    Alkaline Phosphatase 81 33 - 136 U/L    Total Protein 8.2 6.4 - 8.2 g/dL    AST 17 9 - 39 U/L    Bilirubin, Total 0.5 0.0 - 1.2 mg/dL    ALT 8 7 - 45 U/L   B-Type Natriuretic Peptide   Result Value Ref Range     (H) 0 - 99 pg/mL   Blood Gas Venous Full Panel   Result Value Ref Range    POCT pH, Venous 7.34 7.33 - 7.43 pH    POCT pCO2, Venous 50 41 - 51 mm Hg    POCT pO2, Venous 40 35 - 45 mm Hg    POCT SO2, Venous 65 45 - 75 %    POCT Oxy Hemoglobin, Venous 62.1 45.0 - 75.0 %    POCT Hematocrit Calculated, Venous 39.0 36.0 - 46.0 %    POCT Sodium, Venous 135 (L) 136 - 145 mmol/L    POCT Potassium, Venous 3.5 3.5 - 5.3 mmol/L    POCT Chloride, Venous 101 98 - 107 mmol/L     POCT Ionized Calicum, Venous 1.23 1.10 - 1.33 mmol/L    POCT Glucose, Venous 138 (H) 74 - 99 mg/dL    POCT Lactate, Venous 1.4 0.4 - 2.0 mmol/L    POCT Base Excess, Venous 0.5 -2.0 - 3.0 mmol/L    POCT HCO3 Calculated, Venous 27.0 (H) 22.0 - 26.0 mmol/L    POCT Hemoglobin, Venous 13.0 12.0 - 16.0 g/dL    POCT Anion Gap, Venous 11.0 10.0 - 25.0 mmol/L    Patient Temperature 37.0 degrees Celsius    FiO2 32 %   Troponin I, High Sensitivity, Initial   Result Value Ref Range    Troponin I, High Sensitivity (CMC) 6 0 - 34 ng/L   Sars-CoV-2, Influenza A/B and RSV PCR   Result Value Ref Range    Coronavirus 2019, PCR Not Detected Not Detected    Flu A Result Not Detected Not Detected    Flu B Result Not Detected Not Detected    RSV PCR Not Detected Not Detected   Light Blue Top   Result Value Ref Range    Extra Tube Hold for add-ons.    Troponin, High Sensitivity, 1 Hour   Result Value Ref Range    Troponin I, High Sensitivity (CMC) 7 0 - 34 ng/L     XR chest 2 views  Result Date: 4/20/2025  STUDY: Chest Radiographs;  4/20/2025 4:44 AM. INDICATION: Shortness of breath.  Rhonchi, bilaterally. COMPARISON: None Available. ACCESSION NUMBER(S): [id number] ORDERING CLINICIAN: MARYAM COTTRELL TECHNIQUE:  Frontal and lateral chest. FINDINGS: CARDIOMEDIASTINAL SILHOUETTE: Cardiomediastinal silhouette is enlarged in size and configuration.  LUNGS: Increased interstitial markings are seen bilaterally.  There is a band of atelectasis in the left midlung zone.  ABDOMEN: No remarkable upper abdominal findings.  BONES: No acute osseous changes.    Cardiomegaly with mild coronary edema, likely CHF. Signed by Milind Benson    Point of Care Ultrasound  Result Date: 4/20/2025  Maryam Cottrell MD     4/20/2025  5:02 AM Performed by: Maryam Cottrell MD Authorized by: Neida Thurston MD  Cardiac Indications: orthopnea dyspnea Procedure: Thoracic Ultrasound Findings: R Lung Sliding: The RIGHT chest was evaluated and LUNG SLIDING was  visualized. L Lung Sliding: The LEFT chest was evaluated and LUNG SLIDING was visualized. R Effusion: The RIGHT chest was evaluated and there was NO PLEURAL EFFUSION. L Effusion: The LEFT chest was evaluated and there was NO PLEURAL EFFUSION. B-lines:   right B-lines present and   left B-lines present R Consolidation: The RIGHT chest was evaluated and there was NO RIGHT CONSOLIDATION. L Consolidation: The LEFT chest was evaluated and there was NO LEFT CONSOLIDATION. Impression: Thorax: The focused thoracic ultrasound exam had ABNORMAL findings as specified.     Intravitreal Injection, Pharmacologic Agent - OD - Right Eye  Result Date: 3/25/2025  Time Out 3/24/2025. 11:30 AM. Confirmed correct patient, procedure, site, and patient consented. Anesthesia Topical anesthesia was used. Anesthetic medications included Lidocaine 2%. Procedure Preparation included 5% betadine to ocular surface, eyelid speculum. Injection: 1.25 mg bevacizumab 1.25 mg/0.05 mL   Route: intravitreal, Site: Right Eye   NDC: 28039-004-85, Lot: [id number], Expiration date: 10/9/2025 Post-op Post injection exam found visual acuity of at least counting fingers, no retinal detachment, perfused optic nerve. The patient tolerated the procedure well. There were no complications. The patient received written and verbal post procedure care education.     OCT, Retina - OU - Both Eyes  Result Date: 3/25/2025  Right Eye Quality was good. Scan locations included subfoveal. Findings include no diabetic retinopathy. Left Eye Quality was good. Scan locations included subfoveal. Findings include no diabetic retinopathy. Notes Retinal multimodal imaging including photography was completed, and the findings are described in the examination.         Assessment & Plan  Congestive heart failure, unspecified HF chronicity, unspecified heart failure type      Елена Perez is a 89 y.o. female presenting with 89-year-old female with PMHx A-fib on Eliquis, HTN, T2DM who  presents the emergency department for shortness of breath and new oxygen requirement.  In ED patient initially hypertensive 159/109, on 3 L NC, otherwise HDS.  Workup in the ED with elevated , CXR suggestive of CHF, and POCUS with B-lines.  Presentation concerning for acute hypoxic respiratory failure likely in the setting of acute decompensated heart failure.  Differential for AHRF considered includes ACS, pneumonia, asthma/COPD exacerbation, pulmonary embolism, pneumothorax.  Unclear trigger of acute decompensated heart failure but could be related to hypertensive emergency, atrial fibrillation (less likely given rate controlled), or infectious (less likely given nonsuggestive history/physical exam/workup).  Admitted to medicine for further management.    #Acute hypoxic respiratory failure with new oxygen requirement  #Acute decompensated heart failure   #Hypertensive emergency  #Primary hypertension  :: bnp 159, CXR suggestive of CHF, and POCUS with B-lines in ED  :: VBG normal pH 7.34/normal pCO2 50/normal lactate 1.4.  Flu/COVID swabs negative.  :: Troponin negative in ED, EKG in ED atrial fibrillation with PVC similar to prior  :: S/P  home antihypertensives including amlodipine 10 mg, metoprolol tartrate 25 mg, hydrochlorothiazide 25 mg in ED  :: S/P one-time IV Lasix 40 mg.  -BP improved s/p home medications in ED  -Initially on 3 L NC -> s/p IV Lasix 40 mg x1 in ED -> 1 L NC  [ ] Continue to wean oxygen as tolerated (on room air at baseline)  -Continue home amlodipine 10 mg daily  -Switched home metoprolol succinate 50 mg daily-> tartrate 25 mg twice daily  -Held home HCTZ given elevated creatinine  -Hydralazine 10 mg 3 times daily as needed for SBP greater than 160 or DBP greater than 120  -K>4, Mg>2 -> K in ED 3.7 -> ordered 20 mEq Kcl x1  [ ] Mg added on  [ ] Echo ordered to evaluate for heart failure as does not have a formal diagnosis  [ ] If indicated consider GDMT once echo results are back  [  ] Follow-up outpatient with patient's cardiologist (has an appointment on 6/9/2025)  [ ] Home care PT on discharge    #A-fib  -Currently rate controlled  -Telemetry bed ordered  -Continue home Eliquis  [ ] Monitor kidney function and renally dose Eliquis as needed, currently not indicated    #Elevated creatinine not meeting BIANCA criteria  # Mild hyponatremia likely dilutional  :: Mildly elevated creatinine 1.09 (bl 0.96-0.98)  - No fluids given given concern for ADHF  - Held home HCTZ given elevated creatinine  - Avoid nephrotoxic medications  [ ] CTM daily RFP       #Constipation  - Bowel regimen prn    #Nicotine dependence  - Declined nicotine patch     #T2DM  :: Seems to be diet controlled, last A1c 7/20/2024 6.7  - SSI #1 ordered + hypoglycemia protocol  [ ] Repeat A1c ordered  [ ] Monitor POCT    #Hypothyroidism  - Continue home Synthroid 62.5 mcg daily Monday->Saturday and 125 mcg on Sunday     #Vitamin D deficiency  - Continue home vitamin D supplement    #allergic rhinitis  - Continue home Flonase    Dispo: Weaning oxygen to baseline which is room air, pending echo and possible GDMT, did not order PT OT as patient refused SNF but is interested in home PT on discharge.    F: Caution given c/f CHF  E: As needed K>4, Mg>2  D: Cardiac   Access: PIV  Abcx: Not indicated  DVT ppx: Home Eliquis  GI ppx: Not indicated  CODE STATUS: Full code (confirmed on admission)  Emergency contact: Karthikeyan Levy (Son) [phone]     Patient to be staffed by attending physician Dr. Prieto    Plan preliminary until cosigned by attending physician.    Portions of this note have been composed using voice recognition and may contain transcription errors.     Damaris Sainz M.D.  Family Medicine  PGY-2             [1]   Past Medical History:  Diagnosis Date    Pain in leg, unspecified 02/15/2019    Leg pain   [2]   Past Surgical History:  Procedure Laterality Date    OTHER SURGICAL HISTORY  09/30/2022    No history of  surgery   [3]   Family History  Problem Relation Name Age of Onset    Parkinsonism Mother      Liver cancer Sister      Pancreatic cancer Sister     [4] [START ON 4/21/2025] amLODIPine, 10 mg, oral, Daily  apixaban, 5 mg, oral, BID  aspirin, 81 mg, oral, Daily  atorvastatin, 10 mg, oral, Daily  cholecalciferol, 125 mcg, oral, Daily  fluticasone, 1 spray, Each Nostril, Daily  [Held by provider] hydroCHLOROthiazide, 25 mg, oral, Daily  levothyroxine, 62.5 mcg, oral, Daily  metoprolol tartrate, 25 mg, oral, BID  [5]    [6] PRN medications: acetaminophen **OR** acetaminophen **OR** acetaminophen, cyclobenzaprine, melatonin, ondansetron **OR** ondansetron, oxygen, polyethylene glycol, sennosides-docusate sodium

## 2025-04-20 NOTE — ED TRIAGE NOTES
Pt presents to the ED d/t SOB via ECFD. Pt states this started about 30 minutes ago after getting up to use the restroom. Pt was 86% on RA when ECFD arrived. Pt was given two duonebs prior to arrival and she was now at 97% on RA. Pt has audible wheezing and crackles. Pt has a hx of HTN, DM, and afib (pt on eliquis). Pt denies any CP at this time. Pt is AxOx4.

## 2025-04-21 ENCOUNTER — APPOINTMENT (OUTPATIENT)
Dept: CARDIOLOGY | Facility: HOSPITAL | Age: OVER 89
End: 2025-04-21
Payer: COMMERCIAL

## 2025-04-21 VITALS
DIASTOLIC BLOOD PRESSURE: 56 MMHG | OXYGEN SATURATION: 98 % | RESPIRATION RATE: 18 BRPM | HEIGHT: 66 IN | TEMPERATURE: 97.9 F | WEIGHT: 172.2 LBS | BODY MASS INDEX: 27.68 KG/M2 | SYSTOLIC BLOOD PRESSURE: 108 MMHG | HEART RATE: 72 BPM

## 2025-04-21 LAB
ALBUMIN SERPL BCP-MCNC: 4.2 G/DL (ref 3.4–5)
ANION GAP SERPL CALC-SCNC: 15 MMOL/L (ref 10–20)
BASOPHILS # BLD AUTO: 0.02 X10*3/UL (ref 0–0.1)
BASOPHILS NFR BLD AUTO: 0.2 %
BUN SERPL-MCNC: 15 MG/DL (ref 6–23)
CALCIUM SERPL-MCNC: 9.9 MG/DL (ref 8.6–10.6)
CHLORIDE SERPL-SCNC: 98 MMOL/L (ref 98–107)
CO2 SERPL-SCNC: 24 MMOL/L (ref 21–32)
CREAT SERPL-MCNC: 1.11 MG/DL (ref 0.5–1.05)
EGFRCR SERPLBLD CKD-EPI 2021: 48 ML/MIN/1.73M*2
EOSINOPHIL # BLD AUTO: 0.03 X10*3/UL (ref 0–0.4)
EOSINOPHIL NFR BLD AUTO: 0.3 %
ERYTHROCYTE [DISTWIDTH] IN BLOOD BY AUTOMATED COUNT: 16 % (ref 11.5–14.5)
GLUCOSE BLD MANUAL STRIP-MCNC: 86 MG/DL (ref 74–99)
GLUCOSE SERPL-MCNC: 154 MG/DL (ref 74–99)
HCT VFR BLD AUTO: 42 % (ref 36–46)
HGB BLD-MCNC: 13.4 G/DL (ref 12–16)
IMM GRANULOCYTES # BLD AUTO: 0.06 X10*3/UL (ref 0–0.5)
IMM GRANULOCYTES NFR BLD AUTO: 0.6 % (ref 0–0.9)
LYMPHOCYTES # BLD AUTO: 2.99 X10*3/UL (ref 0.8–3)
LYMPHOCYTES NFR BLD AUTO: 31.7 %
MAGNESIUM SERPL-MCNC: 1.94 MG/DL (ref 1.6–2.4)
MCH RBC QN AUTO: 26.8 PG (ref 26–34)
MCHC RBC AUTO-ENTMCNC: 31.9 G/DL (ref 32–36)
MCV RBC AUTO: 84 FL (ref 80–100)
MONOCYTES # BLD AUTO: 0.85 X10*3/UL (ref 0.05–0.8)
MONOCYTES NFR BLD AUTO: 9 %
NEUTROPHILS # BLD AUTO: 5.47 X10*3/UL (ref 1.6–5.5)
NEUTROPHILS NFR BLD AUTO: 58.2 %
NRBC BLD-RTO: 0 /100 WBCS (ref 0–0)
PHOSPHATE SERPL-MCNC: 3.5 MG/DL (ref 2.5–4.9)
PLATELET # BLD AUTO: 255 X10*3/UL (ref 150–450)
POTASSIUM SERPL-SCNC: 3.4 MMOL/L (ref 3.5–5.3)
RBC # BLD AUTO: 5 X10*6/UL (ref 4–5.2)
SODIUM SERPL-SCNC: 134 MMOL/L (ref 136–145)
WBC # BLD AUTO: 9.4 X10*3/UL (ref 4.4–11.3)

## 2025-04-21 PROCEDURE — 85025 COMPLETE CBC W/AUTO DIFF WBC: CPT | Performed by: STUDENT IN AN ORGANIZED HEALTH CARE EDUCATION/TRAINING PROGRAM

## 2025-04-21 PROCEDURE — 0932T N-INVS DET HRT FAIL AUG ECHO: CPT

## 2025-04-21 PROCEDURE — 83735 ASSAY OF MAGNESIUM: CPT | Performed by: STUDENT IN AN ORGANIZED HEALTH CARE EDUCATION/TRAINING PROGRAM

## 2025-04-21 PROCEDURE — 82947 ASSAY GLUCOSE BLOOD QUANT: CPT

## 2025-04-21 PROCEDURE — 93306 TTE W/DOPPLER COMPLETE: CPT

## 2025-04-21 PROCEDURE — 2500000001 HC RX 250 WO HCPCS SELF ADMINISTERED DRUGS (ALT 637 FOR MEDICARE OP): Performed by: STUDENT IN AN ORGANIZED HEALTH CARE EDUCATION/TRAINING PROGRAM

## 2025-04-21 PROCEDURE — 80069 RENAL FUNCTION PANEL: CPT | Performed by: STUDENT IN AN ORGANIZED HEALTH CARE EDUCATION/TRAINING PROGRAM

## 2025-04-21 PROCEDURE — G0378 HOSPITAL OBSERVATION PER HR: HCPCS

## 2025-04-21 PROCEDURE — 2500000001 HC RX 250 WO HCPCS SELF ADMINISTERED DRUGS (ALT 637 FOR MEDICARE OP)

## 2025-04-21 PROCEDURE — 36415 COLL VENOUS BLD VENIPUNCTURE: CPT | Performed by: STUDENT IN AN ORGANIZED HEALTH CARE EDUCATION/TRAINING PROGRAM

## 2025-04-21 PROCEDURE — 2500000004 HC RX 250 GENERAL PHARMACY W/ HCPCS (ALT 636 FOR OP/ED): Mod: JW

## 2025-04-21 PROCEDURE — 2500000002 HC RX 250 W HCPCS SELF ADMINISTERED DRUGS (ALT 637 FOR MEDICARE OP, ALT 636 FOR OP/ED)

## 2025-04-21 RX ORDER — FUROSEMIDE 20 MG/1
20 TABLET ORAL DAILY
Qty: 30 TABLET | Refills: 1 | Status: SHIPPED | OUTPATIENT
Start: 2025-04-21 | End: 2025-06-20

## 2025-04-21 RX ORDER — METFORMIN HYDROCHLORIDE 500 MG/1
500 TABLET ORAL
Qty: 30 TABLET | Refills: 2 | Status: SHIPPED | OUTPATIENT
Start: 2025-04-21 | End: 2025-07-20

## 2025-04-21 RX ORDER — ALUMINUM HYDROXIDE, MAGNESIUM HYDROXIDE, AND SIMETHICONE 1200; 120; 1200 MG/30ML; MG/30ML; MG/30ML
20 SUSPENSION ORAL 4 TIMES DAILY PRN
Status: DISCONTINUED | OUTPATIENT
Start: 2025-04-21 | End: 2025-04-21 | Stop reason: HOSPADM

## 2025-04-21 RX ORDER — POTASSIUM CHLORIDE 1.5 G/1.58G
40 POWDER, FOR SOLUTION ORAL ONCE
Status: COMPLETED | OUTPATIENT
Start: 2025-04-21 | End: 2025-04-21

## 2025-04-21 RX ORDER — FLASH GLUCOSE SENSOR
KIT MISCELLANEOUS
Qty: 2 EACH | Refills: 11 | Status: SHIPPED | OUTPATIENT
Start: 2025-04-21

## 2025-04-21 RX ORDER — HYDROCHLOROTHIAZIDE 25 MG/1
12.5 TABLET ORAL DAILY
Qty: 45 TABLET | Refills: 0 | Status: SHIPPED | OUTPATIENT
Start: 2025-04-21 | End: 2025-07-20

## 2025-04-21 RX ORDER — METFORMIN HYDROCHLORIDE 500 MG/1
500 TABLET ORAL
Qty: 60 TABLET | Refills: 2 | Status: CANCELLED | OUTPATIENT
Start: 2025-04-21 | End: 2025-07-20

## 2025-04-21 RX ADMIN — POTASSIUM CHLORIDE 40 MEQ: 1.5 POWDER, FOR SOLUTION ORAL at 17:30

## 2025-04-21 RX ADMIN — FLUTICASONE PROPIONATE 1 SPRAY: 50 SPRAY, METERED NASAL at 10:46

## 2025-04-21 RX ADMIN — ASPIRIN 81 MG: 81 TABLET, CHEWABLE ORAL at 08:51

## 2025-04-21 RX ADMIN — PERFLUTREN 2 ML OF DILUTION: 6.52 INJECTION, SUSPENSION INTRAVENOUS at 10:16

## 2025-04-21 RX ADMIN — ALUMINUM HYDROXIDE, MAGNESIUM HYDROXIDE, AND SIMETHICONE 20 ML: 200; 200; 20 SUSPENSION ORAL at 01:34

## 2025-04-21 RX ADMIN — APIXABAN 5 MG: 5 TABLET, FILM COATED ORAL at 08:50

## 2025-04-21 RX ADMIN — METOPROLOL TARTRATE 25 MG: 25 TABLET, FILM COATED ORAL at 08:50

## 2025-04-21 RX ADMIN — AMLODIPINE BESYLATE 10 MG: 10 TABLET ORAL at 08:51

## 2025-04-21 RX ADMIN — CHOLECALCIFEROL TAB 125 MCG (5000 UNIT) 125 MCG: 125 TAB at 08:51

## 2025-04-21 RX ADMIN — LEVOTHYROXINE SODIUM 62.5 MCG: 25 TABLET ORAL at 06:14

## 2025-04-21 ASSESSMENT — COGNITIVE AND FUNCTIONAL STATUS - GENERAL
DAILY ACTIVITIY SCORE: 24
MOBILITY SCORE: 23
CLIMB 3 TO 5 STEPS WITH RAILING: A LITTLE

## 2025-04-21 ASSESSMENT — PAIN SCALES - WONG BAKER: WONGBAKER_NUMERICALRESPONSE: NO HURT

## 2025-04-21 ASSESSMENT — ACTIVITIES OF DAILY LIVING (ADL): LACK_OF_TRANSPORTATION: NO

## 2025-04-21 ASSESSMENT — PAIN SCALES - GENERAL: PAINLEVEL_OUTOF10: 0 - NO PAIN

## 2025-04-21 NOTE — CARE PLAN
The patient's goals for the shift include get some rest    The clinical goals for the shift include pt will have no s/s of SOB this shift    Over the shift, the patient did make progress toward the following goals.     Problem: Safety - Adult  Goal: Free from fall injury  Outcome: Progressing

## 2025-04-21 NOTE — PROGRESS NOTES
Pt was given dc orders son at side both voiced understanding, all needs addressed IV to R arm dc'd no s/s of infection at the site, transport notified.

## 2025-04-21 NOTE — PROGRESS NOTES
04/21/25 1733   Discharge Planning   Living Arrangements Alone  (son lives upstairs and pt lives downstairs)   Support Systems Children   Assistance Needed none   Type of Residence Private residence   Expected Discharge Disposition Home   Does the patient need discharge transport arranged? Yes   Financial Resource Strain   How hard is it for you to pay for the very basics like food, housing, medical care, and heating? Not very   Housing Stability   In the last 12 months, was there a time when you were not able to pay the mortgage or rent on time? N   At any time in the past 12 months, were you homeless or living in a shelter (including now)? N   Transportation Needs   In the past 12 months, has lack of transportation kept you from medical appointments or from getting medications? no   In the past 12 months, has lack of transportation kept you from meetings, work, or from getting things needed for daily living? No   Patient Choice   Provider Choice list and CMS website (https://medicare.gov/care-compare#search) for post-acute Quality and Resource Measure Data were provided and reviewed with: Patient;Family   Intensity of Service   Intensity of Service 0-30 min     PCP: Natty Gabriel   DATE OF LAST VISIT: 1/2025 and has an upcoming appt on 4/28  PHARMACY: [facility]   RECENT FALLS: denies  EQUIPMENT USED IN HOME: Shower chair/grab bars   HOME O2/CPAP/NEBS: N/A   TRANSPORT HOME: sonKarthikeyan   CURRENT HC: N/A   DIABETIC/SUPPLIES NEEDED: yes/per son did not know pt was a diabetic but he states per attending order a freestyle luis carlos sensor and supplies for DC    Address, phone and emergency contact information verified. TCC met with pt and pt's sonKarthikeyan at bedside. Per pt and son, pt is primarily independent with ADLs-denies needing any PT/OT on DC. Pt states she would like a HHA but per Karthikeyan in the past when they met with a CM regarding passport services they stated they would have  to review assets so they declined. Per Karthikeyan they will follow up with PCP, denies any DC needs. All questions and concerns answered. Will continue to follow as needed.

## 2025-04-21 NOTE — DISCHARGE INSTRUCTIONS
Manoj Perez     Thanks for coming to  for your care! As you know, you came in with shortness of breath. While you were here we did imaging of your chest and you were found to have enlargement of your heart and fluid in your lungs concerning for new heart failure. Your heart ultrasound was normal, but we will still treat you as heart failure given your symptoms. These are some important things to remember when you are discharged:   We are sending you home with these medications: lasix 20 mg daily (this will help keep fluid off your lungs).  You also have diabetes, so I am discharging you on metformin and I have ordered you a continuous glucose monitor to keep an eye on your sugars, your son can manage this with an erica on his phone. Your blood pressure has also been low here, so I have decreased your hydrochlorothiazide to 12.5 (1/2 of your normal pill)  We have ordered these labs to be drawn for you: none   Consultations - you were referred to see the following specialists: primary care doctor and cardiologist.  You should receive a call from central scheduling in the next few days if you do not receive a call within 3-5 business days please call [phone] to schedule your appointment.     Best of luck,     Your  Medicine Team

## 2025-04-22 ENCOUNTER — PATIENT OUTREACH (OUTPATIENT)
Dept: PRIMARY CARE | Facility: CLINIC | Age: OVER 89
End: 2025-04-22
Payer: COMMERCIAL

## 2025-04-22 DIAGNOSIS — E03.9 HYPOTHYROIDISM, UNSPECIFIED TYPE: ICD-10-CM

## 2025-04-22 LAB
AORTIC VALVE PEAK VELOCITY: 1.25 M/S
AV PEAK GRADIENT: 6 MMHG
AVA (PEAK VEL): 2.04 CM2
BODY SURFACE AREA: 1.91 M2
EJECTION FRACTION: 63 %
LEFT ATRIUM VOLUME AREA LENGTH INDEX BSA: 38.5 ML/M2
LEFT VENTRICLE INTERNAL DIMENSION DIASTOLE: 4.9 CM (ref 3.5–6)
LEFT VENTRICULAR OUTFLOW TRACT DIAMETER: 2.1 CM
RIGHT VENTRICLE PEAK SYSTOLIC PRESSURE: 29.8 MMHG

## 2025-04-22 RX ORDER — LEVOTHYROXINE SODIUM 125 UG/1
TABLET ORAL
Qty: 52 TABLET | Refills: 0 | Status: SHIPPED | OUTPATIENT
Start: 2025-04-22

## 2025-04-22 NOTE — PROGRESS NOTES
Discharge Facility: [facility]   Discharge Diagnosis: Acute hypoxic respiratory failure, Acute decompensated heart failure, Hypertensive emergency  Admission Date: 4/20/25  Discharge Date: 4/21/25    PCP Appointment Date: 4/25/25  Specialist Appointment Date: needs cardiology  Hospital Encounter and Summary Linked: Yes  ED to Hosp-Admission (Discharged) with Mariaa Prieto MD; Neida Thurston MD (04/20/2025)   See discharge assessment below for further details    Wrap Up  Wrap Up Additional Comments: Patient reports improvement in her breathing. She expresses having a lot of questions after hospital discharge that she does not feel were answered. Answered questions as able and encouraged patient to also discuss any provider questions at appointment. Reviewed CHF education including diet and daily weights. Patient verbalized understanding. She reports managing well at home, son assists as needed. Medication changes reviewed, encouraged to defer to discharge med list once prescriptions are obtained. Encouraged follow up appointments. Patient denies any further questions or concerns. Contact information provided. (4/22/2025 10:20 AM)    Medications  Medications reviewed with patient/caregiver?: Yes (4/22/2025 10:20 AM)  Is the patient having any side effects they believe may be caused by any medication additions or changes?: No (4/22/2025 10:20 AM)  Does the patient have all medications ordered at discharge?: No (son is picking up from pharmacy today) (4/22/2025 10:20 AM)  Prescription Comments: START taking:  FreeStyle Nita 2 Sensor (flash glucose sensor kit)   furosemide (Lasix)   metFORMIN (Glucophage)    CHANGE how you take:  hydroCHLOROthiazide (HYDRODiuril)    STOP taking:  atorvastatin 10 mg tablet (Lipitor)    ASK how to take:  aspirin 81 mg chewable tablet (4/22/2025 10:20 AM)  Care Management Interventions: Provided patient education (4/22/2025 10:20 AM)  Medication Comments:  denies questions or concerns (4/22/2025 10:20 AM)    Appointments  Does the patient have a primary care provider?: Yes (4/22/2025 10:20 AM)  Care Management Interventions: Verified appointment date/time/provider (4/22/2025 10:20 AM)  Care Management Interventions: Advised to schedule with specialist (4/22/2025 10:20 AM)    Self Management  What is the home health agency?: n/a denies need, interested in aide services but have not yet enrolled due to concern for assets. (4/22/2025 10:20 AM)  What Durable Medical Equipment (DME) was ordered?: n/a (4/22/2025 10:20 AM)    Patient Teaching  Does the patient have access to their discharge instructions?: Yes (4/22/2025 10:20 AM)  Care Management Interventions: Reviewed instructions with patient (4/22/2025 10:20 AM)  What is the patient's perception of their health status since discharge?: Improving (4/22/2025 10:20 AM)

## 2025-04-25 ENCOUNTER — APPOINTMENT (OUTPATIENT)
Dept: PRIMARY CARE | Facility: CLINIC | Age: OVER 89
End: 2025-04-25
Payer: COMMERCIAL

## 2025-04-25 VITALS
DIASTOLIC BLOOD PRESSURE: 80 MMHG | OXYGEN SATURATION: 95 % | BODY MASS INDEX: 26.65 KG/M2 | HEART RATE: 113 BPM | HEIGHT: 66 IN | SYSTOLIC BLOOD PRESSURE: 115 MMHG | WEIGHT: 165.8 LBS

## 2025-04-25 DIAGNOSIS — I50.32 CHRONIC HEART FAILURE WITH PRESERVED EJECTION FRACTION: Primary | ICD-10-CM

## 2025-04-25 DIAGNOSIS — I48.91 ATRIAL FIBRILLATION, UNSPECIFIED TYPE (MULTI): ICD-10-CM

## 2025-04-25 DIAGNOSIS — N18.31 CKD STAGE G3A/A1, GFR 45-59 AND ALBUMIN CREATININE RATIO <30 MG/G (MULTI): ICD-10-CM

## 2025-04-25 DIAGNOSIS — E11.22 TYPE 2 DIABETES MELLITUS WITH STAGE 3A CHRONIC KIDNEY DISEASE, WITHOUT LONG-TERM CURRENT USE OF INSULIN (MULTI): ICD-10-CM

## 2025-04-25 DIAGNOSIS — N18.31 TYPE 2 DIABETES MELLITUS WITH STAGE 3A CHRONIC KIDNEY DISEASE, WITHOUT LONG-TERM CURRENT USE OF INSULIN (MULTI): ICD-10-CM

## 2025-04-25 DIAGNOSIS — F17.210 CIGARETTE NICOTINE DEPENDENCE WITHOUT COMPLICATION: ICD-10-CM

## 2025-04-25 PROCEDURE — 99495 TRANSJ CARE MGMT MOD F2F 14D: CPT | Performed by: STUDENT IN AN ORGANIZED HEALTH CARE EDUCATION/TRAINING PROGRAM

## 2025-04-25 PROCEDURE — 3079F DIAST BP 80-89 MM HG: CPT | Performed by: STUDENT IN AN ORGANIZED HEALTH CARE EDUCATION/TRAINING PROGRAM

## 2025-04-25 PROCEDURE — 1160F RVW MEDS BY RX/DR IN RCRD: CPT | Performed by: STUDENT IN AN ORGANIZED HEALTH CARE EDUCATION/TRAINING PROGRAM

## 2025-04-25 PROCEDURE — 3074F SYST BP LT 130 MM HG: CPT | Performed by: STUDENT IN AN ORGANIZED HEALTH CARE EDUCATION/TRAINING PROGRAM

## 2025-04-25 PROCEDURE — 1159F MED LIST DOCD IN RCRD: CPT | Performed by: STUDENT IN AN ORGANIZED HEALTH CARE EDUCATION/TRAINING PROGRAM

## 2025-04-25 NOTE — PROGRESS NOTES
"Subjective   Patient ID: Елена Perez is a 89 y.o. female who presents for Follow-up (Hospital follow-up. ).        HPI    89 y/o female with HTN, hypothyroidism ( ESt with endo ) , hyperparathyroidism , ckd stage3 , dm2 , well controlled , afib on AC and BB, HfpEF  She is here with her son        Son was very agitated abt her dm2 which they reportedly were not aware of    He presumed dm2 was the reason for her SOB , resulting in recent ER visit   A letter with results stating her dm2 controlled diag was sent / mailed since we could not reach them on the phone      Recent ER visit for SOB     She was started on Lasix 20mg at the ER visit and hydrochlorothiazide 25 mg was lowered to half         Visit Vitals  /80   Pulse (!) 113   Ht 1.676 m (5' 6\")   Wt 75.2 kg (165 lb 12.8 oz)   SpO2 95%   BMI 26.76 kg/m²   Smoking Status Every Day   BSA 1.87 m²      No LMP recorded.   Current Outpatient Medications   Medication Instructions    acetaminophen (TYLENOL) 325-650 mg, Every 4 hours PRN    amLODIPine (NORVASC) 10 mg, oral, Daily    apixaban (ELIQUIS) 5 mg, oral, 2 times daily    aspirin 81 mg, oral, Daily    cholecalciferol (Vitamin D-3) 5,000 Units tablet 1 tablet, Daily    cyclobenzaprine (Flexeril) 5 mg tablet 1 tab by mouth for stiffness , as needed    flash glucose sensor kit (FreeStyle Nita 2 Sensor) kit Use as instructed to measure glucose, change every 14 days.    fluticasone (Flonase) 50 mcg/actuation nasal spray 1 spray, Daily    furosemide (LASIX) 20 mg, oral, Daily    hydroCHLOROthiazide (HYDRODIURIL) 12.5 mg, oral, Daily    levothyroxine (Synthroid, Levoxyl) 125 mcg tablet Take 0.5 tablet by mouth every day x6 days, and 1 full tablet on day7    metFORMIN (GLUCOPHAGE) 500 mg, oral, Daily with breakfast    metoprolol succinate XL (TOPROL-XL) 50 mg, oral, Daily, Do not crush or chew.    sennosides-docusate sodium (Lindsey-Colace) 8.6-50 mg tablet 1 tablet, Daily PRN      Social History     Tobacco Use    " Smoking status: Every Day     Types: Cigarettes    Smokeless tobacco: Never    Tobacco comments:     7/23/24: 3/4 ppd , smokes only at home   Substance Use Topics    Alcohol use: Not Currently        Review of Systems    Constitutional : No feeling poorly / fevers/ chills / night sweats/ fatigue   Cardiovascular : No CP /Palpitations/ lower extremity edema / syncope   Respiratory : No Cough /LUZ/Dyspnea at rest   Gastrointestinal : No abd pain / N/V  No bloody stools/ melena / constipation  Endo : No polyuria/polydipsia/ muscle weakness / sluggishness   CNS: No confusion / HA/ tingling/ numbness/ weakness of extremities  Psychiatric: No anxiety/ depression/ SI/HI    All other systems have been reviewed and are negative for complaint       Physical Exam    Constitutional : Vitals reviewed. Alert and in no distress  Cardiovascular : RRR, Normal S1, S2, no peripheral edema   Pulmonary: No respiratory distress, CTAB   MSK : Normal gait and station , strength and tone   Skin: Warm to touch ,  normal skin turgor   Neurologic : CNs 2-12 grossly intact , no obvious FNDs  Psych : A,Ox3, normal mood and affect      Assessment/Plan   Diagnoses and all orders for this visit:  Chronic heart failure with preserved ejection fraction  Atrial fibrillation, unspecified type (Multi)  CKD stage G3a/A1, GFR 45-59 and albumin creatinine ratio <30 mg/g (Multi)  Cigarette nicotine dependence without complication  Type 2 diabetes mellitus with stage 3a chronic kidney disease, without long-term current use of insulin (Multi)      89 y/o female with HTN, hypothyroidism ( ESt with endo ) , hyperparathyroidism , ckd stage3 , dm2 , well controlled , afib on AC and BB, HfpEF  She is here with her son         Hospital course, labs and imaging reports reviewed . Med reconciliation done . F/u labs/Imaging, if needed were ordered .   Hfpef : causes and possible exacerbations in the future and etiology discussed   Smoking cessation addressed  "    Diabetes diagnosis was discussed , she has been well controlled in the last 9 m   No need for additional med management   She can continue her current diet given the fact that there has been no change in A1c, is a testament that she is doing well with her diet   Echo from 4/'25 reviewed and findings discussed   \" Leaky valves\" addressed   CKD stge 3 discussed, stable over the years   Will continue to monitor not that she is on Lasix 20mg       I informed the pt and her son that his demeanor in the visit was not acceptable .  It is their responsibility to review their mail sent from any doctor's office .            Conditions addressed and mgmt as noted above.  Pertinent labs, images/ imaging reports , chart review was done .   Age appropriate labs / labs for mgmt of chronic medical conditions ordered, further mgmt pending the results.         RTO in  m or sooner if needed . Labs to be done few days prior to the next visit.        This note is intended for the physician writing it, as well as to communicate findings to other healthcare professionals. These notes use medical lexicon that may be misunderstood by non medical persons. Therefore, interpretations of medical notes and terminology should be approached with caution.        "

## 2025-04-27 NOTE — PROGRESS NOTES
Imaging    Macula OCT   Right eye (OD): macular edema with thickening, IRF, resolved subretinal fluid (SRF), mostly stable vs previous  Left eye (OS): Normal contour and appearance, no IRF/subretinal fluid (SRF)      Assessment/Plan   Seen last 3/24/25      Diagnoses and all orders for this visit:  Central retinal vein occlusion with macular edema of right eye  89 YO F presented with complaint of right eye painless vision loss for the past 6 months. Presented to [facility] ED 11/2024 for evaluation but did not see ophthalmology at that time.   (+)med hx of CKD and Benign Essential Hypertension  -Seen initially on 1/27/25 - On DFE and MAC OCT extensive macular edema with hemorrhages noted right eye, noted decreased vision right eye about 6 months, had MIL #1 OD 1/27/25 for CRVO with ME  -status post (s/p) MIL #2 OD     -Today, vision stable, macular edema appears slightly improved on OCT   -Patient did note some subjective improvement since MIL  -Given slight but insignificant improvement of IRF with MIL x 2 OD, recommend trial of NESTOR OD today (approved for NESTOR)  - status post (s/p) NESTOR #1 OD 04/28/25  - RTC in 1 month     Pseudophakia OU  Ocular HTN OU  -Being followed by Dr. Archer

## 2025-04-28 ENCOUNTER — APPOINTMENT (OUTPATIENT)
Dept: OPHTHALMOLOGY | Facility: CLINIC | Age: OVER 89
End: 2025-04-28
Payer: COMMERCIAL

## 2025-04-28 DIAGNOSIS — H34.8110 CENTRAL RETINAL VEIN OCCLUSION WITH MACULAR EDEMA OF RIGHT EYE: Primary | ICD-10-CM

## 2025-04-28 ASSESSMENT — ENCOUNTER SYMPTOMS
CONSTITUTIONAL NEGATIVE: 0
MUSCULOSKELETAL NEGATIVE: 0
CARDIOVASCULAR NEGATIVE: 0
ALLERGIC/IMMUNOLOGIC NEGATIVE: 0
NEUROLOGICAL NEGATIVE: 0
RESPIRATORY NEGATIVE: 0
HEMATOLOGIC/LYMPHATIC NEGATIVE: 0
GASTROINTESTINAL NEGATIVE: 0
EYES NEGATIVE: 1
PSYCHIATRIC NEGATIVE: 0
ENDOCRINE NEGATIVE: 0

## 2025-04-28 ASSESSMENT — VISUAL ACUITY
OS_SC: 20/30
OS_SC+: -2
METHOD: SNELLEN - LINEAR
OD_SC: 20/400

## 2025-04-28 ASSESSMENT — TONOMETRY
OS_IOP_MMHG: 20
OD_IOP_MMHG: 20
IOP_METHOD: GOLDMANN APPLANATION

## 2025-04-28 ASSESSMENT — CUP TO DISC RATIO
OD_RATIO: .3
OS_RATIO: .3

## 2025-04-28 ASSESSMENT — EXTERNAL EXAM - RIGHT EYE: OD_EXAM: NORMAL

## 2025-04-28 ASSESSMENT — SLIT LAMP EXAM - LIDS
COMMENTS: DERMATOCHALASIS, MGD
COMMENTS: DERMATOCHALASIS, MGD

## 2025-04-28 ASSESSMENT — EXTERNAL EXAM - LEFT EYE: OS_EXAM: NORMAL

## 2025-04-29 DIAGNOSIS — E11.9 DIABETES MELLITUS WITHOUT COMPLICATION: Primary | ICD-10-CM

## 2025-04-29 DIAGNOSIS — I50.9 CONGESTIVE HEART FAILURE, UNSPECIFIED HF CHRONICITY, UNSPECIFIED HEART FAILURE TYPE: ICD-10-CM

## 2025-05-02 ENCOUNTER — APPOINTMENT (OUTPATIENT)
Dept: PHARMACY | Facility: HOSPITAL | Age: OVER 89
End: 2025-05-02
Payer: COMMERCIAL

## 2025-05-02 NOTE — PROGRESS NOTES
Clinical Pharmacy Appointment    Patient ID: Елена Perez is a 89 y.o. female who presents for No chief complaint on file..    Pt is here for First appointment.      Referring Provider: Natty Gabriel,*  PCP: Natty Gabriel MD   Last visit with PCP: ***   Next visit with PCP: ***      Subjective     HPI  DIABETES MELLITUS TYPE 2:    Known diabetic complications: {YES wildcard/NO:60}.  Does patient follow with Endocrinology: {YES/NO:19391}  Last optometry exam: ***  Most recent visit in Podiatry: ***-- patient {DM reports/denies:54345} sores or cuts on feet today      Current diabetic medications include:  ***    Clarifications to above regimen: ***  Adverse Effects: ***    Past diabetic medications include:      Glucose Readings:  {BGMonitorin}  Patient tests BG *** times per day    Current home BG readings:   Fasting: ***   Post Prandial: ***  Previous home BG readings:   Fasting: ***   Post Prandial: ***    Any episodes of hypoglycemia? {YES/NO/NA:09441}.    Did patient treat episode of hypoglycemia appropriately? {YES/NO/NA:10488}  Does the patient have a prescription for ready-to-use Glucagon? {Diabetes Hypoglycemia:66739}  Does pt have proteinuria? {YES/NO:89635}      Lifestyle:  Diet: *** meals/day.   BK: ***  LN: ***  DN: ***  Snacks: ***  Drinks: ***  Physical Activity: ***    Secondary Prevention:  Statin? {YES/NO:04704}  ACE-I/ARB? {YES/NO:55320}  Aspirin? {YES/NO:81952}    Pertinent PMH Review:  PMH of Pancreatitis: {YES,NO:90374}  PMH of Retinopathy: {YES,NO:80107}  PMH of Urinary Tract Infections: {YES,NO:13435}  PMH of MTC: {YES,NO:61932}     Medication Reconciliation:  Changed:   Added:   Discontinued:     Drug Interactions  {Drug Interactions:70220}    Medication System Management  Patient's preferred pharmacy: ***  Adherence/Organization: ***  Affordability/Accessibility: ***  CONGESTIVE HEART FAILURE ASSESSMENT  Does patient follow with Cardiology:  {YES/DATE/NO:77342}    Staging  Ejection Fraction: ***% (***)  NYHA Class: {CHF NYHA Stagin}  ACC/AHA Stage: {CHF ABCD Stagin}    Symptom Assessment  Weight changes/edema?: {YES wildcard/NO:60}  Dyspnea?: {BADYSPNEA:21418}  Dizziness/syncope/palpitations?: {YES wildcard/NO:60}    Medication Therapy  Current Regimen (GDMT):  ARNI/ACEi/ARB: {YES wildcard/NO:60}  Beta Blocker: {YES wildcard/NO:60}  MRA: {YES wildcard/NO:60}  SGLT2i: {YES wildcard/NO:60}    Other therapy:  ***    Previous Medications: ***    Clarifications to above regimen: ***   Adverse Effects: ***     Secondary Prevention  The ASCVD Risk score (Ron BLUM, et al., 2019) failed to calculate for the following reasons:    The 2019 ASCVD risk score is only valid for ages 40 to 79  Aspirin 81mg? {yes/no:53233}  Statin?: {YES wildcard/NO:60}  HTN?: {YES wildcard/NO:60}        Objective   Allergies[1]  Social History     Social History Narrative    Lives in a home , son lives downstairs     Both share cooking    Son helps with laundry     She is able to household chores             Was born in [place] , raised in [place], [place]. Moved to [place], [place] at age [age]     Three adult children , 1 son and 2 daughters. Son - as above     One daughter lives in [place] the other daughter in [place]       Medication Review  Current Outpatient Medications   Medication Instructions    acetaminophen (TYLENOL) 325-650 mg, Every 4 hours PRN    amLODIPine (NORVASC) 10 mg, oral, Daily    apixaban (ELIQUIS) 5 mg, oral, 2 times daily    aspirin 81 mg, oral, Daily    cholecalciferol (Vitamin D-3) 5,000 Units tablet 1 tablet, Daily    cyclobenzaprine (Flexeril) 5 mg tablet 1 tab by mouth for stiffness , as needed    flash glucose sensor kit (FreeStyle Nita 2 Sensor) kit Use as instructed to measure glucose, change every 14 days.    fluticasone (Flonase) 50 mcg/actuation nasal spray 1 spray, Daily    furosemide (LASIX) 20 mg, oral, Daily     "hydroCHLOROthiazide (HYDRODIURIL) 12.5 mg, oral, Daily    levothyroxine (Synthroid, Levoxyl) 125 mcg tablet Take 0.5 tablet by mouth every day x6 days, and 1 full tablet on day7    metFORMIN (GLUCOPHAGE) 500 mg, oral, Daily with breakfast    metoprolol succinate XL (TOPROL-XL) 50 mg, oral, Daily, Do not crush or chew.    sennosides-docusate sodium (Lindsey-Colace) 8.6-50 mg tablet 1 tablet, Daily PRN      Vitals  BP Readings from Last 2 Encounters:   04/25/25 115/80   04/21/25 108/56     BMI Readings from Last 1 Encounters:   04/25/25 26.76 kg/m²      Labs  A1C  Lab Results   Component Value Date    HGBA1C 6.7 (H) 04/20/2025    HGBA1C 6.7 (H) 07/24/2024     BMP  Lab Results   Component Value Date    CALCIUM 9.9 04/21/2025     (L) 04/21/2025    K 3.4 (L) 04/21/2025    CO2 24 04/21/2025    CL 98 04/21/2025    BUN 15 04/21/2025    CREATININE 1.11 (H) 04/21/2025    EGFR 48 (L) 04/21/2025     LFTs  Lab Results   Component Value Date    ALT 8 04/20/2025    AST 17 04/20/2025    ALKPHOS 81 04/20/2025    BILITOT 0.5 04/20/2025     FLP  Lab Results   Component Value Date    TRIG 113 07/24/2024    CHOL 206 (H) 07/24/2024    LDLF 138 (H) 09/08/2023    LDLCALC 144 (H) 07/24/2024    HDL 39.4 07/24/2024     Urine Microalbumin  No results found for: \"MICROALBCREA\"  Weight Management  Wt Readings from Last 3 Encounters:   04/25/25 75.2 kg (165 lb 12.8 oz)   04/20/25 78.1 kg (172 lb 3.2 oz)   02/05/25 78.3 kg (172 lb 11.2 oz)      There is no height or weight on file to calculate BMI.     Assessment/Plan   Problem List Items Addressed This Visit    None      Clinical Pharmacist follow-up: ***, {In-Person / Telehealth:32687} visit    Continue all meds under the continuation of care with the referring provider and clinical pharmacy team.    Thank you,  Rona Paredes, PharmD  Clinical Pharmacist  [phone] (Office Phone)   [phone] (Fax)   [email or web address]     Verbal consent to manage patient's drug " therapy was obtained from {patient rights:04827}. They were informed they may decline to participate or withdraw from participation in pharmacy services at any time.       [1]   Allergies  Allergen Reactions    Lisinopril Swelling     Patient son stated that after taking this medication patient feet became swollen.

## 2025-05-05 ENCOUNTER — APPOINTMENT (OUTPATIENT)
Dept: OPHTHALMOLOGY | Facility: CLINIC | Age: OVER 89
End: 2025-05-05
Payer: COMMERCIAL

## 2025-05-06 ENCOUNTER — APPOINTMENT (OUTPATIENT)
Dept: PHARMACY | Facility: HOSPITAL | Age: OVER 89
End: 2025-05-06
Payer: COMMERCIAL

## 2025-05-06 DIAGNOSIS — I50.9 CONGESTIVE HEART FAILURE, UNSPECIFIED HF CHRONICITY, UNSPECIFIED HEART FAILURE TYPE: ICD-10-CM

## 2025-05-06 DIAGNOSIS — E11.9 DIABETES MELLITUS WITHOUT COMPLICATION: Primary | ICD-10-CM

## 2025-05-06 RX ORDER — IBUPROFEN 200 MG
CAPSULE ORAL
Qty: 30 STRIP | Refills: 11 | Status: SHIPPED | OUTPATIENT
Start: 2025-05-06

## 2025-05-06 RX ORDER — LANCETS
EACH MISCELLANEOUS
Qty: 100 EACH | Refills: 3 | Status: SHIPPED | OUTPATIENT
Start: 2025-05-06

## 2025-05-06 RX ORDER — INSULIN PUMP SYRINGE, 3 ML
EACH MISCELLANEOUS
Qty: 1 EACH | Refills: 0 | Status: SHIPPED | OUTPATIENT
Start: 2025-05-06 | End: 2026-05-06

## 2025-05-06 NOTE — ASSESSMENT & PLAN NOTE
Patient has Stage D Class 1 HFpEF with most recent EF 63%. Patient is not on complete GDMT.     Rationale for plan: Patient has no symptoms at this time and EF is preserved.    Medication Changes:  CONTINUE:  Metoprolol 50 mg daily   Furosemide 20 mg daily   Monitoring and Education:  Weigh yourself without clothing daily after using the bathroom first thing in the morning before breakfast   Contact your physician/seek help immediately if you notice the following with symptoms of shortness of breath or swelling in your extremities:   Weight gain of 3+ lbs overnight   Weight gain of 5+ lbs in a week   Physical limitations to your normal physical activity level   Limit fluid intake as instructed by your doctor and follow a heart friendly diet low in salt, fat, and focused in lean meats   Aim to exercise for 30 minutes anywhere between 3 to 5 times a week or more, depending on your physical limitations   Keep a log of your daily BP, HR and weight to share with providers   If you are a smoker or drink alcohol, consider cessation to improve your heart health

## 2025-05-06 NOTE — ASSESSMENT & PLAN NOTE
Patient's goal A1c is < 7%.  Is pt at goal? Yes, A1c 6.7 as of April 2025  Patient's SMBGs are Unable to be determined as patient does not have glucometer.     Rationale for plan: Patient A1c is well controlled and stable based off recent A1c.    Medication Changes:  CONTINUE  No medication at this time    Future Considerations:  May consider treatment if A1c increases    Monitoring and Education:   BG targets and A1C goals reviewed with the patient. , Hypoglycemia s/s and treatment reviewed. , All patient questions were answered and discussed., and Patient expressed understanding.

## 2025-05-06 NOTE — PROGRESS NOTES
"  Clinical Pharmacy Appointment    Patient ID: Елена Perez is a 89 y.o. female who presents for Diabetes and Congestive Heart Failure.    Pt is here for First appointment.      Referring Provider: Natty Gabriel,*  PCP: Natty Gabriel MD   Last visit with PCP: 4/25/25   Next visit with PCP: Not scheduled      Subjective     HPI  DIABETES MELLITUS TYPE 2:    Known diabetic complications: Yes - may have retinopathy, gets injections in eyes but unsure of why .  Does patient follow with Endocrinology: No  Last optometry exam: a few weeks ago   Most recent visit in Podiatry: Does not follow-- patient denies sores or cuts on feet today      Current diabetic medications include:  Metformin 500 mg daily     Clarifications to above regimen: Does not take due to Side effects of feeling unwell   Adverse Effects: None    Past diabetic medications include:  None    Glucose Readings:  Fingerstick/Glucometer  Patient tests BG 0 times per day    Current home BG readings:   Fasting: N/A   Post Prandial: N/A  Previous home BG readings:   Fasting: N/A   Post Prandial: N/A    Any episodes of hypoglycemia? N/A.    Did patient treat episode of hypoglycemia appropriately? N/A  Does the patient have a prescription for ready-to-use Glucagon? Not on insulin  Does pt have proteinuria? N/ANo results found for: \"MICROALBCREA\"       Lifestyle:  Diet: 2 meals/day.   BK: small, sausage links, english muffin, milk  LN: Skips usually  DN: Pastas, salad, little meat at dinner   Snacks: rare  Drinks: pepsi small can, water,   Physical Activity: Limited    Secondary Prevention:  Statin? No  ACE-I/ARB? No  Aspirin? No    Pertinent PMH Review:  PMH of Pancreatitis: No  PMH of Retinopathy: Yes  PMH of Urinary Tract Infections: No  PMH of MTC: No     Drug Interactions  No relevant drug interactions were noted.    Medication System Management  Patient's preferred pharmacy: [facility]   Adherence/Organization: No Concern at this time "   Affordability/Accessibility: No Concerns at this time    CONGESTIVE HEART FAILURE ASSESSMENT  Does patient follow with Cardiology: Yes    Date: 5/9/25    Staging  Ejection Fraction: 63% (5/6/25)  NYHA Class: Class I - No limitations to physical activity  ACC/AHA Stage: D - Refractory    Symptom Assessment  Weight changes/edema?: No  Dyspnea?: None  Dizziness/syncope/palpitations?: No    Medication Therapy  Current Regimen (GDMT):  ARNI/ACEi/ARB: No  Beta Blocker: Yes - metoprolol  MRA: No  SGLT2i: No    Other therapy:  None    Previous Medications: None    Clarifications to above regimen: None   Adverse Effects: None     Secondary Prevention  The ASCVD Risk score (Ron BLUM, et al., 2019) failed to calculate for the following reasons:    The 2019 ASCVD risk score is only valid for ages 40 to 79  Aspirin 81mg? yes  Statin?: No  HTN?: Yes - Well managed        Objective   Allergies[1]  Social History     Social History Narrative    Lives in a home , son lives downstairs     Both share cooking    Son helps with laundry     She is able to household chores             Was born in [place] , raised in [place], [place]. Moved to [place], [place] at age [age]     Three adult children , 1 son and 2 daughters. Son - as above     One daughter lives in [place] the other daughter in [place]       Medication Review  Current Outpatient Medications   Medication Instructions    acetaminophen (TYLENOL) 325-650 mg, Every 4 hours PRN    amLODIPine (NORVASC) 10 mg, oral, Daily    apixaban (ELIQUIS) 5 mg, oral, 2 times daily    aspirin 81 mg, oral, Daily    cholecalciferol (Vitamin D-3) 5,000 Units tablet 1 tablet, Daily    cyclobenzaprine (Flexeril) 5 mg tablet 1 tab by mouth for stiffness , as needed    fluticasone (Flonase) 50 mcg/actuation nasal spray 1 spray, Daily    furosemide (LASIX) 20 mg, oral, Daily    hydroCHLOROthiazide (HYDRODIURIL) 12.5 mg, oral, Daily    levothyroxine (Synthroid, Levoxyl) 125 mcg tablet Take 0.5 tablet  "by mouth every day x6 days, and 1 full tablet on day7    metoprolol succinate XL (TOPROL-XL) 50 mg, oral, Daily, Do not crush or chew.    sennosides-docusate sodium (Lindsey-Colace) 8.6-50 mg tablet 1 tablet, Daily PRN      Vitals  BP Readings from Last 2 Encounters:   04/25/25 115/80   04/21/25 108/56     BMI Readings from Last 1 Encounters:   04/25/25 26.76 kg/m²      Labs  A1C  Lab Results   Component Value Date    HGBA1C 6.7 (H) 04/20/2025    HGBA1C 6.7 (H) 07/24/2024     BMP  Lab Results   Component Value Date    CALCIUM 9.9 04/21/2025     (L) 04/21/2025    K 3.4 (L) 04/21/2025    CO2 24 04/21/2025    CL 98 04/21/2025    BUN 15 04/21/2025    CREATININE 1.11 (H) 04/21/2025    EGFR 48 (L) 04/21/2025     LFTs  Lab Results   Component Value Date    ALT 8 04/20/2025    AST 17 04/20/2025    ALKPHOS 81 04/20/2025    BILITOT 0.5 04/20/2025     FLP  Lab Results   Component Value Date    TRIG 113 07/24/2024    CHOL 206 (H) 07/24/2024    LDLF 138 (H) 09/08/2023    LDLCALC 144 (H) 07/24/2024    HDL 39.4 07/24/2024     Urine Microalbumin  No results found for: \"MICROALBCREA\"  Weight Management  Wt Readings from Last 3 Encounters:   04/25/25 75.2 kg (165 lb 12.8 oz)   04/20/25 78.1 kg (172 lb 3.2 oz)   02/05/25 78.3 kg (172 lb 11.2 oz)      There is no height or weight on file to calculate BMI.     Assessment/Plan   Problem List Items Addressed This Visit       Diabetes mellitus without complication    Patient's goal A1c is < 7%.  Is pt at goal? Yes, A1c 6.7 as of April 2025  Patient's SMBGs are Unable to be determined as patient does not have glucometer.     Rationale for plan: Patient A1c is well controlled and stable based off recent A1c.    Medication Changes:  CONTINUE  No medication at this time    Future Considerations:  May consider treatment if A1c increases    Monitoring and Education:   BG targets and A1C goals reviewed with the patient. , Hypoglycemia s/s and treatment reviewed. , All patient questions were " answered and discussed., and Patient expressed understanding.            Congestive heart failure, unspecified HF chronicity, unspecified heart failure type    Patient has Stage D Class 1 HFpEF with most recent EF 63%. Patient is not on complete GDMT.     Rationale for plan: Patient has no symptoms at this time and EF is preserved.    Medication Changes:  CONTINUE:  Metoprolol 50 mg daily   Furosemide 20 mg daily   Monitoring and Education:  Weigh yourself without clothing daily after using the bathroom first thing in the morning before breakfast   Contact your physician/seek help immediately if you notice the following with symptoms of shortness of breath or swelling in your extremities:   Weight gain of 3+ lbs overnight   Weight gain of 5+ lbs in a week   Physical limitations to your normal physical activity level   Limit fluid intake as instructed by your doctor and follow a heart friendly diet low in salt, fat, and focused in lean meats   Aim to exercise for 30 minutes anywhere between 3 to 5 times a week or more, depending on your physical limitations   Keep a log of your daily BP, HR and weight to share with providers   If you are a smoker or drink alcohol, consider cessation to improve your heart health              Clinical Pharmacist follow-up: As needed, Telehealth visit    Continue all meds under the continuation of care with the referring provider and clinical pharmacy team.    Thank you,  Rona Paredes, PharmD  Clinical Pharmacist  [phone] (Office Phone)   [phone] (Fax)   [email or web address]     Verbal consent to manage patient's drug therapy was obtained from the patient and an individual authorized to act on behalf of a patient. They were informed they may decline to participate or withdraw from participation in pharmacy services at any time.         [1]   Allergies  Allergen Reactions    Lisinopril Swelling     Patient son stated that after taking this medication patient  feet became swollen.

## 2025-05-13 ENCOUNTER — PATIENT OUTREACH (OUTPATIENT)
Dept: PRIMARY CARE | Facility: CLINIC | Age: OVER 89
End: 2025-05-13
Payer: COMMERCIAL

## 2025-05-13 NOTE — PROGRESS NOTES
Completed telephonic follow-up with patient after recent visit with Dr. Gabriel    Spoke to patient during outreach call.    Patient reports feeling: Improved    Patient has questions or concerns about medications: No    Have all prescribed medications been filled? Yes    Patient has necessary resources to manage their care? Yes    Patient has questions or concerns? No    Next care management follow-up approximately within one month.    Patient with multiple medication questions and difficulty managing/remembering her regimen so she was not taking some of them. Reviewed regimen with patient who verbalizes understanding. Encouraged use of pill box which she states she does have. Her son also lives with her and is able to assist. Encouraged patient to use most recent medication list from appt if able. She expresses frustration with taking so many cardiology medications. Encouraged to discuss with cardiologist. She has not yet had appointment. Encouraged patient to call if any further questions or concerns.

## 2025-05-14 ENCOUNTER — CLINICAL SUPPORT (OUTPATIENT)
Dept: EMERGENCY MEDICINE | Facility: HOSPITAL | Age: OVER 89
DRG: 291 | End: 2025-05-14
Payer: COMMERCIAL

## 2025-05-14 ENCOUNTER — APPOINTMENT (OUTPATIENT)
Dept: RADIOLOGY | Facility: HOSPITAL | Age: OVER 89
DRG: 291 | End: 2025-05-14
Payer: COMMERCIAL

## 2025-05-14 ENCOUNTER — HOSPITAL ENCOUNTER (INPATIENT)
Facility: HOSPITAL | Age: OVER 89
LOS: 2 days | Discharge: HOME HEALTH | DRG: 291 | End: 2025-05-16
Attending: EMERGENCY MEDICINE | Admitting: STUDENT IN AN ORGANIZED HEALTH CARE EDUCATION/TRAINING PROGRAM
Payer: COMMERCIAL

## 2025-05-14 ENCOUNTER — APPOINTMENT (OUTPATIENT)
Dept: CARDIOLOGY | Facility: HOSPITAL | Age: OVER 89
DRG: 291 | End: 2025-05-14
Payer: COMMERCIAL

## 2025-05-14 DIAGNOSIS — M19.90 ARTHRITIS: ICD-10-CM

## 2025-05-14 DIAGNOSIS — J96.02 ACUTE RESPIRATORY FAILURE WITH HYPOXIA AND HYPERCAPNIA: ICD-10-CM

## 2025-05-14 DIAGNOSIS — I48.91 NEW ONSET A-FIB (MULTI): ICD-10-CM

## 2025-05-14 DIAGNOSIS — I73.9 PERIPHERAL VASCULAR DISEASE: ICD-10-CM

## 2025-05-14 DIAGNOSIS — I50.31 ACUTE DIASTOLIC HEART FAILURE: ICD-10-CM

## 2025-05-14 DIAGNOSIS — I50.23 ACUTE ON CHRONIC SYSTOLIC CONGESTIVE HEART FAILURE: Primary | ICD-10-CM

## 2025-05-14 DIAGNOSIS — J96.01 ACUTE RESPIRATORY FAILURE WITH HYPOXIA AND HYPERCAPNIA: ICD-10-CM

## 2025-05-14 DIAGNOSIS — I50.9 CONGESTIVE HEART FAILURE, UNSPECIFIED HF CHRONICITY, UNSPECIFIED HEART FAILURE TYPE: ICD-10-CM

## 2025-05-14 DIAGNOSIS — I50.9 HEART FAILURE, UNSPECIFIED HF CHRONICITY, UNSPECIFIED HEART FAILURE TYPE: ICD-10-CM

## 2025-05-14 LAB
ALBUMIN SERPL BCP-MCNC: 4.3 G/DL (ref 3.4–5)
ALP SERPL-CCNC: 84 U/L (ref 33–136)
ALT SERPL W P-5'-P-CCNC: 10 U/L (ref 7–45)
ANION GAP BLDV CALCULATED.4IONS-SCNC: 11 MMOL/L (ref 10–25)
ANION GAP BLDV CALCULATED.4IONS-SCNC: 7 MMOL/L (ref 10–25)
ANION GAP SERPL CALC-SCNC: 16 MMOL/L (ref 10–20)
APPEARANCE UR: CLEAR
APTT PPP: 28 SECONDS (ref 26–36)
AST SERPL W P-5'-P-CCNC: 13 U/L (ref 9–39)
BASE EXCESS BLDV CALC-SCNC: -1.9 MMOL/L (ref -2–3)
BASE EXCESS BLDV CALC-SCNC: 0.3 MMOL/L (ref -2–3)
BASOPHILS # BLD MANUAL: 0 X10*3/UL (ref 0–0.1)
BASOPHILS NFR BLD MANUAL: 0 %
BILIRUB SERPL-MCNC: 0.7 MG/DL (ref 0–1.2)
BILIRUB UR STRIP.AUTO-MCNC: NEGATIVE MG/DL
BLASTS # BLD MANUAL: 0 X10*3/UL
BLASTS NFR BLD MANUAL: 0 %
BNP SERPL-MCNC: 509 PG/ML (ref 0–99)
BODY TEMPERATURE: 37 DEGREES CELSIUS
BODY TEMPERATURE: 37 DEGREES CELSIUS
BUN SERPL-MCNC: 16 MG/DL (ref 6–23)
CA-I BLDV-SCNC: 1.21 MMOL/L (ref 1.1–1.33)
CA-I BLDV-SCNC: 1.21 MMOL/L (ref 1.1–1.33)
CALCIUM SERPL-MCNC: 9.7 MG/DL (ref 8.6–10.6)
CARDIAC TROPONIN I PNL SERPL HS: 6 NG/L (ref 0–34)
CHLORIDE BLDV-SCNC: 106 MMOL/L (ref 98–107)
CHLORIDE BLDV-SCNC: 106 MMOL/L (ref 98–107)
CHLORIDE SERPL-SCNC: 102 MMOL/L (ref 98–107)
CO2 SERPL-SCNC: 22 MMOL/L (ref 21–32)
COLOR UR: COLORLESS
CREAT SERPL-MCNC: 1.09 MG/DL (ref 0.5–1.05)
EGFRCR SERPLBLD CKD-EPI 2021: 49 ML/MIN/1.73M*2
EOSINOPHIL # BLD MANUAL: 0.12 X10*3/UL (ref 0–0.4)
EOSINOPHIL NFR BLD MANUAL: 0.9 %
ERYTHROCYTE [DISTWIDTH] IN BLOOD BY AUTOMATED COUNT: 16.2 % (ref 11.5–14.5)
GLUCOSE BLDV-MCNC: 120 MG/DL (ref 74–99)
GLUCOSE BLDV-MCNC: 121 MG/DL (ref 74–99)
GLUCOSE SERPL-MCNC: 132 MG/DL (ref 74–99)
GLUCOSE UR STRIP.AUTO-MCNC: NORMAL MG/DL
HCO3 BLDV-SCNC: 23.7 MMOL/L (ref 22–26)
HCO3 BLDV-SCNC: 26.5 MMOL/L (ref 22–26)
HCT VFR BLD AUTO: 38.6 % (ref 36–46)
HCT VFR BLD EST: 35 % (ref 36–46)
HCT VFR BLD EST: 38 % (ref 36–46)
HGB BLD-MCNC: 13.2 G/DL (ref 12–16)
HGB BLDV-MCNC: 11.6 G/DL (ref 12–16)
HGB BLDV-MCNC: 12.5 G/DL (ref 12–16)
HYALINE CASTS #/AREA URNS AUTO: ABNORMAL /LPF
IMM GRANULOCYTES # BLD AUTO: 0.13 X10*3/UL (ref 0–0.5)
IMM GRANULOCYTES NFR BLD AUTO: 1 % (ref 0–0.9)
INHALED O2 CONCENTRATION: 36 %
INHALED O2 CONCENTRATION: 50 %
INR PPP: 1.5 (ref 0.9–1.1)
KETONES UR STRIP.AUTO-MCNC: NEGATIVE MG/DL
LACTATE BLDV-SCNC: 1.4 MMOL/L (ref 0.4–2)
LACTATE BLDV-SCNC: 1.5 MMOL/L (ref 0.4–2)
LEUKOCYTE ESTERASE UR QL STRIP.AUTO: NEGATIVE
LYMPHOCYTES # BLD MANUAL: 4.81 X10*3/UL (ref 0.8–3)
LYMPHOCYTES NFR BLD MANUAL: 35.9 %
MCH RBC QN AUTO: 27.9 PG (ref 26–34)
MCHC RBC AUTO-ENTMCNC: 34.2 G/DL (ref 32–36)
MCV RBC AUTO: 82 FL (ref 80–100)
METAMYELOCYTES # BLD MANUAL: 0 X10*3/UL
METAMYELOCYTES NFR BLD MANUAL: 0 %
MONOCYTES # BLD MANUAL: 0.58 X10*3/UL (ref 0.05–0.8)
MONOCYTES NFR BLD MANUAL: 4.3 %
MYELOCYTES # BLD MANUAL: 0 X10*3/UL
MYELOCYTES NFR BLD MANUAL: 0 %
NEUTROPHILS # BLD MANUAL: 7.21 X10*3/UL (ref 1.6–5.5)
NEUTS BAND # BLD MANUAL: 0.23 X10*3/UL (ref 0–0.5)
NEUTS BAND NFR BLD MANUAL: 1.7 %
NEUTS SEG # BLD MANUAL: 6.98 X10*3/UL (ref 1.6–5)
NEUTS SEG NFR BLD MANUAL: 52.1 %
NITRITE UR QL STRIP.AUTO: NEGATIVE
NRBC BLD MANUAL-RTO: 0 % (ref 0–0)
NRBC BLD-RTO: 0 /100 WBCS (ref 0–0)
OXYHGB MFR BLDV: 70.8 % (ref 45–75)
OXYHGB MFR BLDV: 75.7 % (ref 45–75)
PCO2 BLDV: 43 MM HG (ref 41–51)
PCO2 BLDV: 48 MM HG (ref 41–51)
PH BLDV: 7.35 PH (ref 7.33–7.43)
PH BLDV: 7.35 PH (ref 7.33–7.43)
PH UR STRIP.AUTO: 5.5 [PH]
PLASMA CELLS # BLD MANUAL: 0 X10*3/UL
PLASMA CELLS NFR BLD MANUAL: 0 %
PLATELET # BLD AUTO: 269 X10*3/UL (ref 150–450)
PO2 BLDV: 40 MM HG (ref 35–45)
PO2 BLDV: 45 MM HG (ref 35–45)
POTASSIUM BLDV-SCNC: 4.1 MMOL/L (ref 3.5–5.3)
POTASSIUM BLDV-SCNC: 4.5 MMOL/L (ref 3.5–5.3)
POTASSIUM SERPL-SCNC: 3.9 MMOL/L (ref 3.5–5.3)
PROMYELOCYTES # BLD MANUAL: 0 X10*3/UL
PROMYELOCYTES NFR BLD MANUAL: 0 %
PROT SERPL-MCNC: 8.5 G/DL (ref 6.4–8.2)
PROT UR STRIP.AUTO-MCNC: ABNORMAL MG/DL
PROTHROMBIN TIME: 16.2 SECONDS (ref 9.8–12.4)
RBC # BLD AUTO: 4.73 X10*6/UL (ref 4–5.2)
RBC # UR STRIP.AUTO: NEGATIVE MG/DL
RBC #/AREA URNS AUTO: ABNORMAL /HPF
RBC MORPH BLD: ABNORMAL
SAO2 % BLDV: 73 % (ref 45–75)
SAO2 % BLDV: 78 % (ref 45–75)
SODIUM BLDV-SCNC: 135 MMOL/L (ref 136–145)
SODIUM BLDV-SCNC: 137 MMOL/L (ref 136–145)
SODIUM SERPL-SCNC: 136 MMOL/L (ref 136–145)
SP GR UR STRIP.AUTO: 1.01
SQUAMOUS #/AREA URNS AUTO: ABNORMAL /HPF
TOTAL CELLS COUNTED BLD: 117
UROBILINOGEN UR STRIP.AUTO-MCNC: NORMAL MG/DL
VARIANT LYMPHS # BLD MANUAL: 0.68 X10*3/UL (ref 0–0.3)
VARIANT LYMPHS NFR BLD: 5.1 %
WBC # BLD AUTO: 13.4 X10*3/UL (ref 4.4–11.3)
WBC #/AREA URNS AUTO: ABNORMAL /HPF

## 2025-05-14 PROCEDURE — 85610 PROTHROMBIN TIME: CPT | Performed by: EMERGENCY MEDICINE

## 2025-05-14 PROCEDURE — 85027 COMPLETE CBC AUTOMATED: CPT | Performed by: EMERGENCY MEDICINE

## 2025-05-14 PROCEDURE — 36415 COLL VENOUS BLD VENIPUNCTURE: CPT | Performed by: EMERGENCY MEDICINE

## 2025-05-14 PROCEDURE — 85730 THROMBOPLASTIN TIME PARTIAL: CPT | Performed by: EMERGENCY MEDICINE

## 2025-05-14 PROCEDURE — 2500000005 HC RX 250 GENERAL PHARMACY W/O HCPCS: Performed by: EMERGENCY MEDICINE

## 2025-05-14 PROCEDURE — 84132 ASSAY OF SERUM POTASSIUM: CPT | Performed by: EMERGENCY MEDICINE

## 2025-05-14 PROCEDURE — 51798 US URINE CAPACITY MEASURE: CPT

## 2025-05-14 PROCEDURE — 94660 CPAP INITIATION&MGMT: CPT

## 2025-05-14 PROCEDURE — 71045 X-RAY EXAM CHEST 1 VIEW: CPT

## 2025-05-14 PROCEDURE — 93005 ELECTROCARDIOGRAM TRACING: CPT

## 2025-05-14 PROCEDURE — 84132 ASSAY OF SERUM POTASSIUM: CPT

## 2025-05-14 PROCEDURE — 99292 CRITICAL CARE ADDL 30 MIN: CPT | Performed by: EMERGENCY MEDICINE

## 2025-05-14 PROCEDURE — 81001 URINALYSIS AUTO W/SCOPE: CPT | Performed by: EMERGENCY MEDICINE

## 2025-05-14 PROCEDURE — 71045 X-RAY EXAM CHEST 1 VIEW: CPT | Performed by: RADIOLOGY

## 2025-05-14 PROCEDURE — 99223 1ST HOSP IP/OBS HIGH 75: CPT | Performed by: STUDENT IN AN ORGANIZED HEALTH CARE EDUCATION/TRAINING PROGRAM

## 2025-05-14 PROCEDURE — 93010 ELECTROCARDIOGRAM REPORT: CPT | Performed by: EMERGENCY MEDICINE

## 2025-05-14 PROCEDURE — 96374 THER/PROPH/DIAG INJ IV PUSH: CPT | Mod: 59

## 2025-05-14 PROCEDURE — 99291 CRITICAL CARE FIRST HOUR: CPT | Performed by: EMERGENCY MEDICINE

## 2025-05-14 PROCEDURE — 2500000004 HC RX 250 GENERAL PHARMACY W/ HCPCS (ALT 636 FOR OP/ED): Mod: JZ

## 2025-05-14 PROCEDURE — 5A09357 ASSISTANCE WITH RESPIRATORY VENTILATION, LESS THAN 24 CONSECUTIVE HOURS, CONTINUOUS POSITIVE AIRWAY PRESSURE: ICD-10-PCS | Performed by: EMERGENCY MEDICINE

## 2025-05-14 PROCEDURE — 85007 BL SMEAR W/DIFF WBC COUNT: CPT | Performed by: EMERGENCY MEDICINE

## 2025-05-14 PROCEDURE — 83880 ASSAY OF NATRIURETIC PEPTIDE: CPT | Performed by: EMERGENCY MEDICINE

## 2025-05-14 PROCEDURE — 93308 TTE F-UP OR LMTD: CPT | Performed by: EMERGENCY MEDICINE

## 2025-05-14 PROCEDURE — 2500000001 HC RX 250 WO HCPCS SELF ADMINISTERED DRUGS (ALT 637 FOR MEDICARE OP): Performed by: EMERGENCY MEDICINE

## 2025-05-14 PROCEDURE — 2500000001 HC RX 250 WO HCPCS SELF ADMINISTERED DRUGS (ALT 637 FOR MEDICARE OP): Performed by: STUDENT IN AN ORGANIZED HEALTH CARE EDUCATION/TRAINING PROGRAM

## 2025-05-14 PROCEDURE — 93010 ELECTROCARDIOGRAM REPORT: CPT | Performed by: INTERNAL MEDICINE

## 2025-05-14 PROCEDURE — 1200000002 HC GENERAL ROOM WITH TELEMETRY DAILY

## 2025-05-14 PROCEDURE — 99291 CRITICAL CARE FIRST HOUR: CPT | Mod: 25 | Performed by: EMERGENCY MEDICINE

## 2025-05-14 PROCEDURE — 93308 TTE F-UP OR LMTD: CPT

## 2025-05-14 PROCEDURE — 84484 ASSAY OF TROPONIN QUANT: CPT | Performed by: EMERGENCY MEDICINE

## 2025-05-14 RX ORDER — HYDROCHLOROTHIAZIDE 25 MG/1
12.5 TABLET ORAL DAILY
Status: DISCONTINUED | OUTPATIENT
Start: 2025-05-15 | End: 2025-05-16 | Stop reason: HOSPADM

## 2025-05-14 RX ORDER — FUROSEMIDE 10 MG/ML
40 INJECTION INTRAMUSCULAR; INTRAVENOUS ONCE
Status: COMPLETED | OUTPATIENT
Start: 2025-05-14 | End: 2025-05-14

## 2025-05-14 RX ORDER — NITROGLYCERIN 0.4 MG/1
TABLET SUBLINGUAL
Status: DISCONTINUED
Start: 2025-05-14 | End: 2025-05-14 | Stop reason: WASHOUT

## 2025-05-14 RX ORDER — NAPROXEN SODIUM 220 MG/1
81 TABLET, FILM COATED ORAL DAILY
Status: DISCONTINUED | OUTPATIENT
Start: 2025-05-15 | End: 2025-05-16 | Stop reason: HOSPADM

## 2025-05-14 RX ORDER — AMOXICILLIN 250 MG
1 CAPSULE ORAL DAILY PRN
Status: DISCONTINUED | OUTPATIENT
Start: 2025-05-14 | End: 2025-05-16 | Stop reason: HOSPADM

## 2025-05-14 RX ORDER — FUROSEMIDE 10 MG/ML
40 INJECTION INTRAMUSCULAR; INTRAVENOUS ONCE
Status: COMPLETED | OUTPATIENT
Start: 2025-05-15 | End: 2025-05-15

## 2025-05-14 RX ORDER — NITROGLYCERIN 20 MG/100ML
INJECTION INTRAVENOUS
Status: DISCONTINUED
Start: 2025-05-14 | End: 2025-05-14 | Stop reason: WASHOUT

## 2025-05-14 RX ORDER — METOPROLOL SUCCINATE 50 MG/1
50 TABLET, EXTENDED RELEASE ORAL ONCE
Status: COMPLETED | OUTPATIENT
Start: 2025-05-14 | End: 2025-05-14

## 2025-05-14 RX ORDER — LEVOTHYROXINE SODIUM 125 UG/1
62.5 TABLET ORAL DAILY
Status: DISCONTINUED | OUTPATIENT
Start: 2025-05-15 | End: 2025-05-16 | Stop reason: HOSPADM

## 2025-05-14 RX ORDER — FUROSEMIDE 10 MG/ML
INJECTION INTRAMUSCULAR; INTRAVENOUS
Status: COMPLETED
Start: 2025-05-14 | End: 2025-05-14

## 2025-05-14 RX ORDER — AMLODIPINE BESYLATE 10 MG/1
10 TABLET ORAL DAILY
Status: DISCONTINUED | OUTPATIENT
Start: 2025-05-15 | End: 2025-05-15

## 2025-05-14 RX ORDER — ACETAMINOPHEN 325 MG/1
650 TABLET ORAL EVERY 4 HOURS PRN
Status: DISCONTINUED | OUTPATIENT
Start: 2025-05-14 | End: 2025-05-16 | Stop reason: HOSPADM

## 2025-05-14 RX ORDER — METOPROLOL TARTRATE 25 MG/1
12.5 TABLET, FILM COATED ORAL EVERY 6 HOURS
Status: DISCONTINUED | OUTPATIENT
Start: 2025-05-15 | End: 2025-05-15

## 2025-05-14 RX ORDER — FLUTICASONE PROPIONATE 50 MCG
1 SPRAY, SUSPENSION (ML) NASAL DAILY
Status: DISCONTINUED | OUTPATIENT
Start: 2025-05-15 | End: 2025-05-16 | Stop reason: HOSPADM

## 2025-05-14 RX ORDER — METOPROLOL SUCCINATE 50 MG/1
50 TABLET, EXTENDED RELEASE ORAL DAILY
Status: DISCONTINUED | OUTPATIENT
Start: 2025-05-15 | End: 2025-05-14

## 2025-05-14 RX ADMIN — APIXABAN 5 MG: 5 TABLET, FILM COATED ORAL at 22:02

## 2025-05-14 RX ADMIN — FUROSEMIDE 40 MG: 10 INJECTION INTRAMUSCULAR; INTRAVENOUS at 16:07

## 2025-05-14 RX ADMIN — METOPROLOL SUCCINATE 50 MG: 50 TABLET, EXTENDED RELEASE ORAL at 20:45

## 2025-05-14 RX ADMIN — Medication 50 PERCENT: at 16:02

## 2025-05-14 ASSESSMENT — LIFESTYLE VARIABLES
HAVE PEOPLE ANNOYED YOU BY CRITICIZING YOUR DRINKING: NO
TOTAL SCORE: 0
EVER HAD A DRINK FIRST THING IN THE MORNING TO STEADY YOUR NERVES TO GET RID OF A HANGOVER: NO
HAVE YOU EVER FELT YOU SHOULD CUT DOWN ON YOUR DRINKING: NO
EVER FELT BAD OR GUILTY ABOUT YOUR DRINKING: NO

## 2025-05-14 NOTE — ED TRIAGE NOTES
Patient presented via [facility] with complaints of shortness of breath at home, pmh of chf.  Pt was hypoxic on room air, not o2 dependent at home.  Pt ws placed on NC with minimal improvement, pt placed on %, with correction of o2 sats.

## 2025-05-14 NOTE — ED PROVIDER NOTES
EMERGENCY DEPARTMENT ENCOUNTER      Pt Name: Елена Perez  MRN: [id number]  Birthdate 1934  Date of evaluation: 5/14/2025  Provider: Johnny Khan DO    CHIEF COMPLAINT       Chief Complaint   Patient presents with    Shortness of Breath         HISTORY OF PRESENT ILLNESS    HPI    89-year-old female with past medical history significant for A-fib on Eliquis and metoprolol, hypertension, type 2 diabetes mellitus, HFpEF presenting to the Emergency Department for evaluation of progressively worsening shortness of breath since yesterday evening.  Per EMS report patient was tachycardic, hypoxic requiring 15 L nonrebreather which brought patient's ox saturation up to 97%.  No other interventions en route per EMS.  I spoke with patient's son who arrived later who advised patient ran out of her metoprolol 1 week ago however had been feeling fine until yesterday evening and even went to the store yesterday afternoon without shortness of breath, chest pain, lightheadedness, heart palpitations, diaphoresis or other symptoms.  Patient denies experience any chest pain at any time.  States she has not been sick with cough, fevers, chills, night sweats.  On review of systems does report struggle with some constipation over the past couple days with only mild intermittent crampy abdominal pain.  On chart review patient appears to have been admitted at this facility on 4/20/2025 for CHF exacerbation with LVEF of 60 to 65% with normal RVSP, no diastolic dysfunction and only mild left atrial dilation.     Nursing Notes were reviewed.    PAST MEDICAL HISTORY   Medical History[1]      SURGICAL HISTORY     Surgical History[2]      CURRENT MEDICATIONS       Previous Medications    ACETAMINOPHEN (TYLENOL) 325 MG TABLET    Take 1-2 tablets (325-650 mg) by mouth every 4 hours if needed for mild pain (1 - 3).    AMLODIPINE (NORVASC) 10 MG TABLET    Take 1 tablet (10 mg) by mouth once daily.    APIXABAN (ELIQUIS) 5 MG TABLET     Take 1 tablet (5 mg) by mouth 2 times a day.    ASPIRIN 81 MG CHEWABLE TABLET    Chew 1 tablet (81 mg) once daily.    BLOOD GLUCOSE MONITORING METER    Use to check blood sugar daily.    BLOOD SUGAR DIAGNOSTIC (BLOOD GLUCOSE TEST)    Use to check blood sugar daily.    CHOLECALCIFEROL (VITAMIN D-3) 5,000 UNITS TABLET    Take 1 tablet (125 mcg) by mouth once daily.    CYCLOBENZAPRINE (FLEXERIL) 5 MG TABLET    1 tab by mouth for stiffness , as needed    FLUTICASONE (FLONASE) 50 MCG/ACTUATION NASAL SPRAY    Administer 1 spray into each nostril once daily. Shake gently. Before first use, prime pump. After use, clean tip and replace cap.    FUROSEMIDE (LASIX) 20 MG TABLET    Take 1 tablet (20 mg) by mouth once daily.    HYDROCHLOROTHIAZIDE (HYDRODIURIL) 25 MG TABLET    Take 0.5 tablets (12.5 mg) by mouth once daily.    LANCETS MISC    Use to check blood sugar daily    LEVOTHYROXINE (SYNTHROID, LEVOXYL) 125 MCG TABLET    Take 0.5 tablet by mouth every day x6 days, and 1 full tablet on day7    METOPROLOL SUCCINATE XL (TOPROL-XL) 50 MG 24 HR TABLET    Take 1 tablet (50 mg) by mouth once daily. Do not crush or chew.    SENNOSIDES-DOCUSATE SODIUM (DENIZ-COLACE) 8.6-50 MG TABLET    Take 1 tablet by mouth once daily as needed.       ALLERGIES     Lisinopril    FAMILY HISTORY     Family History[3]       SOCIAL HISTORY     Social History[4]    SCREENINGS                        PHYSICAL EXAM    (up to 7 for level 4, 8 or more for level 5)     ED Triage Vitals   Temp Heart Rate Respirations BP   -- 05/14/25 1549 05/14/25 1549 05/14/25 1549    (!) 170 20 (!) 132/110      Pulse Ox Temp src Heart Rate Source Patient Position   05/14/25 1549 -- -- --   97 %         BP Location FiO2 (%)     05/14/25 1549 05/14/25 1602     Right arm 50 %       Physical Exam  Vitals and nursing note reviewed.   Constitutional:       General: She is in acute distress.      Appearance: She is ill-appearing. She is not toxic-appearing or diaphoretic.   HENT:       Head: Normocephalic and atraumatic.   Neck:      Thyroid: No thyromegaly.      Trachea: No tracheal deviation.   Cardiovascular:      Rate and Rhythm: Regular rhythm. Tachycardia present.      Pulses: Normal pulses.      Heart sounds: Normal heart sounds.   Pulmonary:      Comments: Patient is tachypneic in the 30s with accessory muscle use, appears to be fatiguing.  Bilateral Rales and faint bilateral wheezes.  Breath sounds equal bilaterally.  Chest:      Chest wall: No mass, deformity, tenderness, crepitus or edema. There is no dullness to percussion.   Abdominal:      Palpations: Abdomen is soft.      Tenderness: There is no abdominal tenderness. There is no guarding or rebound.   Musculoskeletal:      Cervical back: Normal range of motion and neck supple.      Right lower leg: No edema.      Left lower leg: No edema.   Skin:     General: Skin is warm and dry.   Neurological:      General: No focal deficit present.      Mental Status: She is alert and oriented to person, place, and time.   Psychiatric:         Mood and Affect: Mood normal.         Behavior: Behavior normal.          DIAGNOSTIC RESULTS     LABS:  Labs Reviewed   CBC WITH AUTO DIFFERENTIAL - Abnormal       Result Value    WBC 13.4 (*)     nRBC 0.0      RBC 4.73      Hemoglobin 13.2      Hematocrit 38.6      MCV 82      MCH 27.9      MCHC 34.2      RDW 16.2 (*)     Platelets 269      Immature Granulocytes %, Automated 1.0 (*)     Immature Granulocytes Absolute, Automated 0.13      Narrative:     The previously reported component Neutrophils % is no longer being reported.  The previously reported component Lymphocytes % is no longer being reported.  The previously reported component Monocytes % is no longer being reported.  The previously   reported component Eosinophils % is no longer being reported.  The previously reported component Basophils % is no longer being reported.  The previously reported component Absolute Neutrophils is no longer  being reported.  The previously reported   component Absolute Lymphocytes is no longer being reported.  The previously reported component Absolute Monocytes is no longer being reported.  The previously reported component Absolute Eosinophils is no longer being reported.  The previously reported   component Absolute Basophils is no longer being reported.   COMPREHENSIVE METABOLIC PANEL - Abnormal    Glucose 132 (*)     Sodium 136      Potassium 3.9      Chloride 102      Bicarbonate 22      Anion Gap 16      Urea Nitrogen 16      Creatinine 1.09 (*)     eGFR 49 (*)     Calcium 9.7      Albumin 4.3      Alkaline Phosphatase 84      Total Protein 8.5 (*)     AST 13      Bilirubin, Total 0.7      ALT 10     PROTIME-INR - Abnormal    Protime 16.2 (*)     INR 1.5 (*)    B-TYPE NATRIURETIC PEPTIDE - Abnormal     (*)     Narrative:        <100 pg/mL - Heart failure unlikely  100-299 pg/mL - Intermediate probability of acute heart                  failure exacerbation. Correlate with clinical                  context and patient history.    >=300 pg/mL - Heart Failure likely. Correlate with clinical                  context and patient history.     Biotin interference may cause falsely decreased results. Patients taking a Biotin dose of up to 5 mg/day should refrain from taking Biotin for 24 hours before sample  collection. Providers may contact their local laboratory for further information.   BLOOD GAS VENOUS FULL PANEL - Abnormal    POCT pH, Venous 7.35      POCT pCO2, Venous 43      POCT pO2, Venous 45      POCT SO2, Venous 78 (*)     POCT Oxy Hemoglobin, Venous 75.7 (*)     POCT Hematocrit Calculated, Venous 35.0 (*)     POCT Sodium, Venous 137      POCT Potassium, Venous 4.1      POCT Chloride, Venous 106      POCT Ionized Calicum, Venous 1.21      POCT Glucose, Venous 120 (*)     POCT Lactate, Venous 1.5      POCT Base Excess, Venous -1.9      POCT HCO3 Calculated, Venous 23.7      POCT Hemoglobin, Venous 11.6  (*)     POCT Anion Gap, Venous 11.0      Patient Temperature 37.0      FiO2 50     BLOOD GAS VENOUS FULL PANEL - Abnormal    POCT pH, Venous 7.35      POCT pCO2, Venous 48      POCT pO2, Venous 40      POCT SO2, Venous 73      POCT Oxy Hemoglobin, Venous 70.8      POCT Hematocrit Calculated, Venous 38.0      POCT Sodium, Venous 135 (*)     POCT Potassium, Venous 4.5      POCT Chloride, Venous 106      POCT Ionized Calicum, Venous 1.21      POCT Glucose, Venous 121 (*)     POCT Lactate, Venous 1.4      POCT Base Excess, Venous 0.3      POCT HCO3 Calculated, Venous 26.5 (*)     POCT Hemoglobin, Venous 12.5      POCT Anion Gap, Venous 7.0 (*)     Patient Temperature 37.0      FiO2 36     URINALYSIS WITH REFLEX CULTURE AND MICROSCOPIC - Abnormal    Color, Urine Colorless (*)     Appearance, Urine Clear      Specific Gravity, Urine 1.012      pH, Urine 5.5      Protein, Urine 30 (1+) (*)     Glucose, Urine Normal      Blood, Urine NEGATIVE      Ketones, Urine NEGATIVE      Bilirubin, Urine NEGATIVE      Urobilinogen, Urine Normal      Nitrite, Urine NEGATIVE      Leukocyte Esterase, Urine NEGATIVE     MANUAL DIFFERENTIAL - Abnormal    Neutrophils %, Manual 52.1      Bands %, Manual 1.7      Lymphocytes %, Manual 35.9      Monocytes %, Manual 4.3      Eosinophils %, Manual 0.9      Basophils %, Manual 0.0      Atypical Lymphocytes %, Manual 5.1      Metamyelocytes %, Manual 0.0      Myelocytes %, Manual 0.0      Plasma Cells %, Manual 0.0      Promyelocytes %, Manual 0.0      Blasts %, Manual 0.0      Seg Neutrophils Absolute, Manual 6.98 (*)     Bands Absolute, Manual 0.23      Lymphocytes Absolute, Manual 4.81 (*)     Monocytes Absolute, Manual 0.58      Eosinophils Absolute, Manual 0.12      Basophils Absolute, Manual 0.00      Atypical Lymphs Absolute, Manual 0.68 (*)     Metamyelocytes Absolute, Manual 0.00      Myelocytes Absolute, Manual 0.00      Plasma Cells Absolute, Manual 0.00      Promyelocytes Absolute,  Manual 0.00      Blasts Absolute, Manual 0.00      Total Cells Counted 117      Neutrophils Absolute, Manual 7.21 (*)     Manual nRBC per 100 Cells 0.0      RBC Morphology No significant RBC morphology present     URINALYSIS MICROSCOPIC WITH REFLEX CULTURE - Abnormal    WBC, Urine 1-5      RBC, Urine NONE      Squamous Epithelial Cells, Urine 1-9 (SPARSE)      Hyaline Casts, Urine 1+ (*)    APTT - Normal    aPTT 28      Narrative:     The APTT is no longer used for monitoring Unfractionated Heparin Therapy. For monitoring Heparin Therapy, use the Heparin Assay.   SERIAL TROPONIN-INITIAL - Normal    Troponin I, High Sensitivity (CMC) 6      Narrative:     Less than 99th percentile of normal range cutoff-  Female and children under 18 years old <35 ng/L; Male <54 ng/L: Negative  Repeat testing should be performed if clinically indicated.     Female and children under 18 years old  ng/L; Male  ng/L:  Consistent with possible cardiac damage and possible increased clinical   risk. Serial measurements may help to assess extent of myocardial damage.     >120 ng/L: Consistent with cardiac damage, increased clinical risk and  myocardial infarction. Serial measurements may help assess extent of   myocardial damage.      NOTE: Children less than 1 year old may have higher baseline troponin   levels and results should be interpreted in conjunction with the overall   clinical context.    NOTE: Troponin I testing is performed using a different   testing methodology at [facility] than at other   [facility] hospitals. Direct result comparisons should only   be made within the same method.     TROPONIN SERIES- (INITIAL, 1 HR)    Narrative:     The following orders were created for panel order Troponin I Series, High Sensitivity (0, 1 HR).  Procedure                               Abnormality         Status                     ---------                               -----------         ------                      Troponin I, High Sensiti...[[id number]]  Normal              Final result               Troponin, High Sensitivi...[[id number]]                                                   Please view results for these tests on the individual orders.   BLOOD GAS VENOUS FULL PANEL   URINALYSIS WITH REFLEX CULTURE AND MICROSCOPIC    Narrative:     The following orders were created for panel order Urinalysis with Reflex Culture and Microscopic.  Procedure                               Abnormality         Status                     ---------                               -----------         ------                     Urinalysis with Reflex C...[[id number]]  Abnormal            Final result               Extra Urine Gray Tube[[id number]]                                                         Please view results for these tests on the individual orders.   EXTRA URINE GRAY TUBE       All other labs were within normal range or not returned as of this dictation.    Imaging  XR chest 1 view   Final Result   1. Moderate appearing right and small to moderate appearing left   pleural effusions   2. Diffuse prominence of the interstitial lung markings suggesting   pulmonary edema        MACRO:   None.        Signed by: Ewa Chan 5/14/2025 4:24 PM   Dictation workstation:   [id number]      Point of Care Ultrasound    (Results Pending)        Procedures    Performed by: Johnny Khan DO  Authorized by: Ricardo Painting MD    Cardiac Indications: fluid overload and tachycardia                  Procedure: Cardiac Ultrasound    Findings:   Views: parasternal long, parasternal short and apical four  The pericardial space was visualized and was NEGATIVE for a significant pericardial effusion.  Activity: Ventricular contractions were visualized.  LV: LV systolic function was DECREASED.  RV: RV size was NORMAL.    Impression:  Cardiac: The focused cardiac ultrasound exam had ABNORMAL findings as specified.      Comments: Globally  decreased left ventricular function without wall motion abnormalities.       EMERGENCY DEPARTMENT COURSE/MDM:     ED Course as of 05/14/25 2054   Wed May 14, 2025   1654 EKG: Sinus rhythm with occasional PACs with a ventricular rate of 85 bpm, IA interval 150 ms, QRS 68 ms, QTc 409 ms no acute ST changes noted. [CH]   2035 Patient sleeping. Heart rate increased to 140s. EKG done showing afib with RVR. Medicine resident at bedside. In discussion with him, will give patient her daily Metoprolol XL 50 mg PO, which she did not take today.  [JL]      ED Course User Index  [CH] Johnny Khan DO  [JL] Ricardo Painting MD         Diagnoses as of 05/14/25 2054   Acute on chronic systolic congestive heart failure   Acute respiratory failure with hypoxia and hypercapnia        Medical Decision Making    89-year-old female with past medical history significant for A-fib on Eliquis and metoprolol, hypertension, type 2 diabetes mellitus, HFpEF presenting to the Emergency Department for evaluation of progressively worsening shortness of breath since yesterday evening.  On arrival patient was in acute respiratory distress and appears fatigued.  Saturating 97% on 15 L nonrebreather but tachypneic in the 30s with accessory muscle use.  Tachycardic with heart rate in the 160s to 170s difficult determine underlying rhythm due to rate but appears SVT on EKG.  Hypertensive with a blood pressure of 156/111 on arrival.  JVD with bilateral Rales and faint wheezes on auscultation with diffuse B-lines on point-of-care ultrasound as well as decreased LVEF suggestive of acute heart failure exacerbation.  Patient was placed on BiPAP and 40 mg of IV Lasix was ordered.  Initial VBG showed respiratory acidosis with a pH of 7.25 and PCO2 of 56.  Patient's respiratory status improved rapidly on BiPAP and patient appeared more awake comfortable.  Cardiac workup ordered as well as repeat VBG 30 minutes after BiPAP.     Chest x-ray shows moderate  right and small to moderate left pleural effusions with diffuse interstitial lung markings suggestive of pulmonary edema.  Repeat cardiac ultrasound shows diffusely diminished LVEF without wall motion abnormalities and no visible effusion or right heart strain. Repeat VBG approximately 30 minutes after starting BiPAP shows resolution of respiratory acidosis with a pH of 7.35 and a pCO2 of 43.  Repeat EKG shows conversion to sinus rhythm with heart rate in the 80s.  BNP elevated 509 with normal troponin with delta pending.  Kidney function appears to be at patient's baseline.  Patient does have a leukocytosis with a white blood cell count of 13.4 likely reactive given patient is afebrile and history and physical exam more consistent with acute CHF exacerbation and I have low clinical concern for infectious etiology although patient meets SIRS criteria.  ED respiratory therapist was paged to trial patient off of BiPAP with plan for repeat VBG in 1 hour to determine if patient can be admitted safely to the floor on telemetry.    Patient weaned to 4 L nasal cannula, saturating in the high 90s on 4 L nasal cannula without respiratory distress.  Repeat VBG 1 hour after patient taking off BiPAP shows complete resolution of patient's respiratory acidosis.  Patient admitted to cardiology under the care of Dr. Watkins.  While patient was in the emergency department waiting transfer up to the floor she converted from sinus rhythm to A-fib with RVR and was given a dose of her home p.o. metoprolol.    Patient and or family in agreement and understanding of treatment plan.  All questions answered.      I reviewed the case with the attending ED physician. The attending ED physician agrees with the plan. Patient and/or patient´s representative was counseled regarding labs, imaging, likely diagnosis, and plan. All questions were answered.    ED Medications administered this visit:    Medications   oxygen (O2) therapy (4 L/min  inhalation Rate Change Medical Gas 5/14/25 2007)   furosemide (Lasix) injection 40 mg (40 mg intravenous Given 5/14/25 1607)   metoprolol succinate XL (Toprol-XL) 24 hr tablet 50 mg (50 mg oral Given 5/14/25 2045)       New Prescriptions from this visit:    New Prescriptions    No medications on file       Follow-up:  No follow-up provider specified.      Final Impression:   1. Acute on chronic systolic congestive heart failure    2. Acute respiratory failure with hypoxia and hypercapnia          (Please note that portions of this note were completed with a voice recognition program.  Efforts were made to edit the dictations but occasionally words are mis-transcribed.)       Johnny Khan DO  Resident  05/15/25 1552  ---------------------------------------------    This patient was seen by the resident physician. I have seen and examined the patient, agree with the workup, evaluation, management and diagnosis. The care plan has been discussed and I concur.    My assessment reveals the following:    HPI:  Patient is a 89 y/o female with h/o afib on Eliquis and metoprolol, HTN, DM2, HFpEF presenting with acute onset of SOB since last night around 5pm. [facility] BLS squad found patient to be hypoxic to the 70s on RA, increased to 90s on 15L NRB as well as tachycardic to 160s. EMS unable to obtain a BP. No F/C/cough. No CP. No N/V/abd pain. No urinary complaints. Has been out of Metoprolol for 1.5 weeks. Patient is unable to provide a reliable history due to respiratory distress. Patient requires my immediate attention.    Limitations to History: Respiratory distress    Additional History Obtained from: [facility]    PE:  Vital signs reviewed in nursing triage note, EMR flowsheets, and at patient's bedside  GEN: Patient is awake, alert, mildly anxious, cooperative, and in moderate to severe respiratory distress.  HEAD: Normocephalic and atraumatic.  EYES: Anicteric sclera.  MOUTH: Mucous membranes moist.  CV: Regular  rhythm. Tachycardic (+) s1/s2. No murmurs/rubs/gallops.  PULM: CTAB. Mild wheezes. Moderate rales. No crackles.  GI: Soft, non-tender, non-distended without rebound or guarding.  EXT: No deformities noted. Full range of motion at all major joints. Non-tender. No BLE swelling.   NEURO: Moves all extremities. No gross focal deficits. Sensation grossly intact throughout.  SKIN: Warm, dry. No erythema or ecchymosis.    Medical Decision Making:  - Monitor  - IV  - Labs  - BiPAP  - pCXR  - Lasix 40 mg IV  - Initial EKGs showed SVT.  - After clinical improvement, repeat EKG showed NSR.   - VBG improved after BiPAP, on 6L NC, downgraded to 4L NC with pulse ox in the mid to high 90s.   - Trial off BiPAP for admission to floor.   - VBG stable 1 hour after off BiPAP  - Patient did become tachycardic again, once when transferring to bedside commode, another while in her sleep.  - Repeat EKG revealed rapid afib.  - Given Metoprolol 50 mg XL PO that she missed today after discussion with cardiology fellow, who is admitting patient.  - Discussion with cardiology fellow about starting amio after rate not decreasing. Holding off amio for now.  - Boarding in ED until tele bed becomes available.     EKG: EKG independently reviewed by the attending ED physician, and I and concur with the resident's/advanced practice provider's interpretation. Initial EKGs showed SVT in the 150s to 160s bpm, normal axis, normal intervals, no ST or T wave changes.     Repeat EKG after clinical improvement: EKG independently reviewed by the attending ED physician, and I and concur with the resident's/advanced practice provider's interpretation. Sinus rhythm at 85 bpm, normal axis, normal intervals, no ST or T wave changes. Similar to old EKG on 4/20/25.    Differential Diagnoses Considered: CHF exacerbation, ACS, PNA    Chronic Medical Conditions Significantly Affecting Care: CHF, Afib, HTN, DM2    External Records Reviewed: I reviewed recent and relevant  outside records including: Recent progress note and telephone calls.     Independent Interpretation of Studies:  I independently interpreted: CXR shows cardiomegaly and increased pulmonary vasc congestion with pulmonary edema and pleural effusions. POCUS showed diffuse B lines bilaterally as well as decreased EF.     Escalation of Care:  Appropriate for inpatient management    Discussion of Management with Other Providers:   I discussed the patient/results with: Cardiology fellow    Ricardo Painting MD         [1]   Past Medical History:  Diagnosis Date    Pain in leg, unspecified 02/15/2019    Leg pain   [2]   Past Surgical History:  Procedure Laterality Date    OTHER SURGICAL HISTORY  09/30/2022    No history of surgery   [3]   Family History  Problem Relation Name Age of Onset    Parkinsonism Mother      Liver cancer Sister      Pancreatic cancer Sister     [4]   Social History  Socioeconomic History    Marital status: Single   Tobacco Use    Smoking status: Every Day     Types: Cigarettes    Smokeless tobacco: Never    Tobacco comments:     7/23/24: 3/4 ppd , smokes only at home   Substance and Sexual Activity    Alcohol use: Not Currently    Drug use: Never   Social History Narrative    Lives in a home , son lives downstairs     Both share cooking    Son helps with laundry     She is able to household chores             Was born in [place] , raised in [place], [place]. Moved to [place], [place] at age [age]     Three adult children , 1 son and 2 daughters. Son - as above     One daughter lives in [place] the other daughter in [place]      Social Drivers of Health     Financial Resource Strain: Low Risk  (4/21/2025)    Overall Financial Resource Strain (CARDIA)     Difficulty of Paying Living Expenses: Not very hard   Food Insecurity: No Food Insecurity (4/20/2025)    Hunger Vital Sign     Worried About Running Out of Food in the Last Year: Never true     Ran Out of Food in the Last Year: Never true    Transportation Needs: No Transportation Needs (4/21/2025)    PRAPARE - Transportation     Lack of Transportation (Medical): No     Lack of Transportation (Non-Medical): No   Physical Activity: Inactive (4/20/2025)    Exercise Vital Sign     Days of Exercise per Week: 0 days     Minutes of Exercise per Session: 0 min   Stress: No Stress Concern Present (4/20/2025)    [employer] - Occupational Stress Questionnaire     Feeling of Stress : Not at all   Social Connections: Moderately Isolated (4/20/2025)    Social Connection and Isolation Panel [NHANES]     Frequency of Communication with Friends and Family: More than three times a week     Frequency of Social Gatherings with Friends and Family: More than three times a week     Attends Denominational Services: More than 4 times per year     Active Member of Clubs or Organizations: No     Attends Club or Organization Meetings: Never     Marital Status:    Intimate Partner Violence: Not At Risk (4/20/2025)    Humiliation, Afraid, Rape, and Kick questionnaire     Fear of Current or Ex-Partner: No     Emotionally Abused: No     Physically Abused: No     Sexually Abused: No   Housing Stability: Low Risk  (4/21/2025)    Housing Stability Vital Sign     Unable to Pay for Housing in the Last Year: No     Number of Times Moved in the Last Year: 0     Homeless in the Last Year: No        Ricardo Painting MD  05/15/25 0513

## 2025-05-15 LAB
ALBUMIN SERPL BCP-MCNC: 3.7 G/DL (ref 3.4–5)
ALBUMIN SERPL BCP-MCNC: 4.1 G/DL (ref 3.4–5)
ALP SERPL-CCNC: 69 U/L (ref 33–136)
ALT SERPL W P-5'-P-CCNC: 8 U/L (ref 7–45)
ANION GAP SERPL CALC-SCNC: 11 MMOL/L (ref 10–20)
ANION GAP SERPL CALC-SCNC: 14 MMOL/L (ref 10–20)
AST SERPL W P-5'-P-CCNC: 11 U/L (ref 9–39)
ATRIAL RATE: 85 BPM
BASOPHILS # BLD AUTO: 0.02 X10*3/UL (ref 0–0.1)
BASOPHILS NFR BLD AUTO: 0.2 %
BILIRUB DIRECT SERPL-MCNC: 0.2 MG/DL (ref 0–0.3)
BILIRUB SERPL-MCNC: 0.6 MG/DL (ref 0–1.2)
BUN SERPL-MCNC: 18 MG/DL (ref 6–23)
BUN SERPL-MCNC: 22 MG/DL (ref 6–23)
CALCIUM SERPL-MCNC: 9.3 MG/DL (ref 8.6–10.6)
CALCIUM SERPL-MCNC: 9.7 MG/DL (ref 8.6–10.6)
CHLORIDE SERPL-SCNC: 100 MMOL/L (ref 98–107)
CHLORIDE SERPL-SCNC: 104 MMOL/L (ref 98–107)
CO2 SERPL-SCNC: 26 MMOL/L (ref 21–32)
CO2 SERPL-SCNC: 26 MMOL/L (ref 21–32)
CREAT SERPL-MCNC: 1.05 MG/DL (ref 0.5–1.05)
CREAT SERPL-MCNC: 1.38 MG/DL (ref 0.5–1.05)
EGFRCR SERPLBLD CKD-EPI 2021: 37 ML/MIN/1.73M*2
EGFRCR SERPLBLD CKD-EPI 2021: 51 ML/MIN/1.73M*2
EOSINOPHIL # BLD AUTO: 0.03 X10*3/UL (ref 0–0.4)
EOSINOPHIL NFR BLD AUTO: 0.4 %
ERYTHROCYTE [DISTWIDTH] IN BLOOD BY AUTOMATED COUNT: 16.7 % (ref 11.5–14.5)
ERYTHROCYTE [DISTWIDTH] IN BLOOD BY AUTOMATED COUNT: 16.9 % (ref 11.5–14.5)
GLUCOSE BLD MANUAL STRIP-MCNC: 146 MG/DL (ref 74–99)
GLUCOSE BLD MANUAL STRIP-MCNC: 98 MG/DL (ref 74–99)
GLUCOSE SERPL-MCNC: 152 MG/DL (ref 74–99)
GLUCOSE SERPL-MCNC: 89 MG/DL (ref 74–99)
HCT VFR BLD AUTO: 36.8 % (ref 36–46)
HCT VFR BLD AUTO: 37.8 % (ref 36–46)
HGB BLD-MCNC: 11.6 G/DL (ref 12–16)
HGB BLD-MCNC: 11.7 G/DL (ref 12–16)
IMM GRANULOCYTES # BLD AUTO: 0.03 X10*3/UL (ref 0–0.5)
IMM GRANULOCYTES NFR BLD AUTO: 0.4 % (ref 0–0.9)
LYMPHOCYTES # BLD AUTO: 2.77 X10*3/UL (ref 0.8–3)
LYMPHOCYTES NFR BLD AUTO: 33.5 %
MAGNESIUM SERPL-MCNC: 2.21 MG/DL (ref 1.6–2.4)
MAGNESIUM SERPL-MCNC: 2.28 MG/DL (ref 1.6–2.4)
MCH RBC QN AUTO: 26.9 PG (ref 26–34)
MCH RBC QN AUTO: 27.6 PG (ref 26–34)
MCHC RBC AUTO-ENTMCNC: 30.7 G/DL (ref 32–36)
MCHC RBC AUTO-ENTMCNC: 31.8 G/DL (ref 32–36)
MCV RBC AUTO: 87 FL (ref 80–100)
MCV RBC AUTO: 88 FL (ref 80–100)
MONOCYTES # BLD AUTO: 0.97 X10*3/UL (ref 0.05–0.8)
MONOCYTES NFR BLD AUTO: 11.7 %
NEUTROPHILS # BLD AUTO: 4.46 X10*3/UL (ref 1.6–5.5)
NEUTROPHILS NFR BLD AUTO: 53.8 %
NRBC BLD-RTO: 0 /100 WBCS (ref 0–0)
NRBC BLD-RTO: 0 /100 WBCS (ref 0–0)
P AXIS: 49 DEGREES
P OFFSET: 207 MS
P ONSET: 150 MS
PHOSPHATE SERPL-MCNC: 3.9 MG/DL (ref 2.5–4.9)
PLATELET # BLD AUTO: 256 X10*3/UL (ref 150–450)
PLATELET # BLD AUTO: 281 X10*3/UL (ref 150–450)
POTASSIUM SERPL-SCNC: 3.8 MMOL/L (ref 3.5–5.3)
POTASSIUM SERPL-SCNC: 4.1 MMOL/L (ref 3.5–5.3)
PR INTERVAL: 150 MS
PROT SERPL-MCNC: 7.1 G/DL (ref 6.4–8.2)
Q ONSET: 225 MS
QRS COUNT: 14 BEATS
QRS DURATION: 68 MS
QT INTERVAL: 344 MS
QTC CALCULATION(BAZETT): 409 MS
QTC FREDERICIA: 386 MS
R AXIS: -2 DEGREES
RBC # BLD AUTO: 4.24 X10*6/UL (ref 4–5.2)
RBC # BLD AUTO: 4.31 X10*6/UL (ref 4–5.2)
SODIUM SERPL-SCNC: 136 MMOL/L (ref 136–145)
SODIUM SERPL-SCNC: 137 MMOL/L (ref 136–145)
T AXIS: 56 DEGREES
T OFFSET: 397 MS
T4 FREE SERPL-MCNC: 1.35 NG/DL (ref 0.78–1.48)
TSH SERPL-ACNC: 6.65 MIU/L (ref 0.44–3.98)
VENTRICULAR RATE: 85 BPM
WBC # BLD AUTO: 8.3 X10*3/UL (ref 4.4–11.3)
WBC # BLD AUTO: 9.7 X10*3/UL (ref 4.4–11.3)

## 2025-05-15 PROCEDURE — 85025 COMPLETE CBC W/AUTO DIFF WBC: CPT | Performed by: STUDENT IN AN ORGANIZED HEALTH CARE EDUCATION/TRAINING PROGRAM

## 2025-05-15 PROCEDURE — 36415 COLL VENOUS BLD VENIPUNCTURE: CPT | Performed by: STUDENT IN AN ORGANIZED HEALTH CARE EDUCATION/TRAINING PROGRAM

## 2025-05-15 PROCEDURE — 2500000001 HC RX 250 WO HCPCS SELF ADMINISTERED DRUGS (ALT 637 FOR MEDICARE OP): Performed by: STUDENT IN AN ORGANIZED HEALTH CARE EDUCATION/TRAINING PROGRAM

## 2025-05-15 PROCEDURE — 2500000002 HC RX 250 W HCPCS SELF ADMINISTERED DRUGS (ALT 637 FOR MEDICARE OP, ALT 636 FOR OP/ED): Performed by: NURSE PRACTITIONER

## 2025-05-15 PROCEDURE — 83735 ASSAY OF MAGNESIUM: CPT | Performed by: NURSE PRACTITIONER

## 2025-05-15 PROCEDURE — 80069 RENAL FUNCTION PANEL: CPT | Mod: CCI | Performed by: NURSE PRACTITIONER

## 2025-05-15 PROCEDURE — 80048 BASIC METABOLIC PNL TOTAL CA: CPT | Performed by: STUDENT IN AN ORGANIZED HEALTH CARE EDUCATION/TRAINING PROGRAM

## 2025-05-15 PROCEDURE — 2500000004 HC RX 250 GENERAL PHARMACY W/ HCPCS (ALT 636 FOR OP/ED): Mod: JZ | Performed by: STUDENT IN AN ORGANIZED HEALTH CARE EDUCATION/TRAINING PROGRAM

## 2025-05-15 PROCEDURE — 2500000004 HC RX 250 GENERAL PHARMACY W/ HCPCS (ALT 636 FOR OP/ED): Mod: JZ | Performed by: NURSE PRACTITIONER

## 2025-05-15 PROCEDURE — 36415 COLL VENOUS BLD VENIPUNCTURE: CPT | Performed by: NURSE PRACTITIONER

## 2025-05-15 PROCEDURE — 85027 COMPLETE CBC AUTOMATED: CPT | Performed by: NURSE PRACTITIONER

## 2025-05-15 PROCEDURE — 2500000002 HC RX 250 W HCPCS SELF ADMINISTERED DRUGS (ALT 637 FOR MEDICARE OP, ALT 636 FOR OP/ED): Performed by: STUDENT IN AN ORGANIZED HEALTH CARE EDUCATION/TRAINING PROGRAM

## 2025-05-15 PROCEDURE — 84443 ASSAY THYROID STIM HORMONE: CPT | Performed by: NURSE PRACTITIONER

## 2025-05-15 PROCEDURE — 82947 ASSAY GLUCOSE BLOOD QUANT: CPT

## 2025-05-15 PROCEDURE — 82248 BILIRUBIN DIRECT: CPT | Performed by: STUDENT IN AN ORGANIZED HEALTH CARE EDUCATION/TRAINING PROGRAM

## 2025-05-15 PROCEDURE — 83735 ASSAY OF MAGNESIUM: CPT | Performed by: STUDENT IN AN ORGANIZED HEALTH CARE EDUCATION/TRAINING PROGRAM

## 2025-05-15 PROCEDURE — 1200000002 HC GENERAL ROOM WITH TELEMETRY DAILY

## 2025-05-15 PROCEDURE — 2500000001 HC RX 250 WO HCPCS SELF ADMINISTERED DRUGS (ALT 637 FOR MEDICARE OP): Performed by: NURSE PRACTITIONER

## 2025-05-15 PROCEDURE — 84439 ASSAY OF FREE THYROXINE: CPT | Performed by: NURSE PRACTITIONER

## 2025-05-15 RX ORDER — INSULIN LISPRO 100 [IU]/ML
0-5 INJECTION, SOLUTION INTRAVENOUS; SUBCUTANEOUS
Status: DISCONTINUED | OUTPATIENT
Start: 2025-05-15 | End: 2025-05-16 | Stop reason: HOSPADM

## 2025-05-15 RX ORDER — METOPROLOL SUCCINATE 50 MG/1
50 TABLET, EXTENDED RELEASE ORAL ONCE
Status: COMPLETED | OUTPATIENT
Start: 2025-05-15 | End: 2025-05-15

## 2025-05-15 RX ORDER — DAPAGLIFLOZIN 10 MG/1
10 TABLET, FILM COATED ORAL DAILY
Status: DISCONTINUED | OUTPATIENT
Start: 2025-05-15 | End: 2025-05-16 | Stop reason: HOSPADM

## 2025-05-15 RX ORDER — METOPROLOL SUCCINATE 50 MG/1
50 TABLET, EXTENDED RELEASE ORAL DAILY
Status: DISCONTINUED | OUTPATIENT
Start: 2025-05-16 | End: 2025-05-16 | Stop reason: HOSPADM

## 2025-05-15 RX ORDER — POTASSIUM CHLORIDE 1.5 G/1.58G
40 POWDER, FOR SOLUTION ORAL ONCE
Status: COMPLETED | OUTPATIENT
Start: 2025-05-15 | End: 2025-05-16

## 2025-05-15 RX ORDER — FUROSEMIDE 10 MG/ML
40 INJECTION INTRAMUSCULAR; INTRAVENOUS ONCE
Status: COMPLETED | OUTPATIENT
Start: 2025-05-15 | End: 2025-05-15

## 2025-05-15 RX ORDER — DAPAGLIFLOZIN 10 MG/1
5 TABLET, FILM COATED ORAL EVERY 24 HOURS
Qty: 15 TABLET | Refills: 1 | Status: SHIPPED | OUTPATIENT
Start: 2025-05-15 | End: 2025-05-15

## 2025-05-15 RX ORDER — METOPROLOL TARTRATE 25 MG/1
12.5 TABLET, FILM COATED ORAL EVERY 6 HOURS
Status: COMPLETED | OUTPATIENT
Start: 2025-05-15 | End: 2025-05-15

## 2025-05-15 RX ORDER — HYDROXYZINE HYDROCHLORIDE 25 MG/1
25 TABLET, FILM COATED ORAL ONCE
Status: COMPLETED | OUTPATIENT
Start: 2025-05-15 | End: 2025-05-15

## 2025-05-15 RX ORDER — SPIRONOLACTONE 25 MG/1
25 TABLET ORAL DAILY
Status: DISCONTINUED | OUTPATIENT
Start: 2025-05-15 | End: 2025-05-16 | Stop reason: HOSPADM

## 2025-05-15 RX ADMIN — FLUTICASONE PROPIONATE 1 SPRAY: 50 SPRAY, METERED NASAL at 08:41

## 2025-05-15 RX ADMIN — LEVOTHYROXINE SODIUM 62.5 MCG: 0.12 TABLET ORAL at 06:27

## 2025-05-15 RX ADMIN — HYDROXYZINE HYDROCHLORIDE 25 MG: 25 TABLET, FILM COATED ORAL at 21:34

## 2025-05-15 RX ADMIN — APIXABAN 5 MG: 5 TABLET, FILM COATED ORAL at 20:35

## 2025-05-15 RX ADMIN — SPIRONOLACTONE 25 MG: 25 TABLET, FILM COATED ORAL at 09:49

## 2025-05-15 RX ADMIN — DAPAGLIFLOZIN 10 MG: 10 TABLET, FILM COATED ORAL at 11:32

## 2025-05-15 RX ADMIN — METOPROLOL SUCCINATE 50 MG: 50 TABLET, EXTENDED RELEASE ORAL at 20:35

## 2025-05-15 RX ADMIN — HYDROCHLOROTHIAZIDE 12.5 MG: 25 TABLET ORAL at 08:42

## 2025-05-15 RX ADMIN — FUROSEMIDE 40 MG: 10 INJECTION INTRAMUSCULAR; INTRAVENOUS at 09:49

## 2025-05-15 RX ADMIN — METOPROLOL TARTRATE 12.5 MG: 25 TABLET, FILM COATED ORAL at 11:32

## 2025-05-15 RX ADMIN — FUROSEMIDE 40 MG: 10 INJECTION INTRAMUSCULAR; INTRAVENOUS at 06:27

## 2025-05-15 RX ADMIN — METOPROLOL TARTRATE 12.5 MG: 25 TABLET, FILM COATED ORAL at 06:27

## 2025-05-15 RX ADMIN — APIXABAN 5 MG: 5 TABLET, FILM COATED ORAL at 08:41

## 2025-05-15 SDOH — ECONOMIC STABILITY: TRANSPORTATION INSECURITY: IN THE PAST 12 MONTHS, HAS LACK OF TRANSPORTATION KEPT YOU FROM MEDICAL APPOINTMENTS OR FROM GETTING MEDICATIONS?: NO

## 2025-05-15 SDOH — SOCIAL STABILITY: SOCIAL INSECURITY: WITHIN THE LAST YEAR, HAVE YOU BEEN HUMILIATED OR EMOTIONALLY ABUSED IN OTHER WAYS BY YOUR PARTNER OR EX-PARTNER?: NO

## 2025-05-15 SDOH — SOCIAL STABILITY: SOCIAL INSECURITY: WITHIN THE LAST YEAR, HAVE YOU BEEN AFRAID OF YOUR PARTNER OR EX-PARTNER?: NO

## 2025-05-15 SDOH — ECONOMIC STABILITY: HOUSING INSECURITY: AT ANY TIME IN THE PAST 12 MONTHS, WERE YOU HOMELESS OR LIVING IN A SHELTER (INCLUDING NOW)?: NO

## 2025-05-15 SDOH — ECONOMIC STABILITY: FOOD INSECURITY: WITHIN THE PAST 12 MONTHS, THE FOOD YOU BOUGHT JUST DIDN'T LAST AND YOU DIDN'T HAVE MONEY TO GET MORE.: NEVER TRUE

## 2025-05-15 SDOH — ECONOMIC STABILITY: HOUSING INSECURITY: IN THE LAST 12 MONTHS, WAS THERE A TIME WHEN YOU WERE NOT ABLE TO PAY THE MORTGAGE OR RENT ON TIME?: NO

## 2025-05-15 SDOH — ECONOMIC STABILITY: INCOME INSECURITY: IN THE PAST 12 MONTHS HAS THE ELECTRIC, GAS, OIL, OR WATER COMPANY THREATENED TO SHUT OFF SERVICES IN YOUR HOME?: NO

## 2025-05-15 SDOH — SOCIAL STABILITY: SOCIAL INSECURITY: DO YOU FEEL ANYONE HAS EXPLOITED OR TAKEN ADVANTAGE OF YOU FINANCIALLY OR OF YOUR PERSONAL PROPERTY?: NO

## 2025-05-15 SDOH — ECONOMIC STABILITY: FOOD INSECURITY: WITHIN THE PAST 12 MONTHS, YOU WORRIED THAT YOUR FOOD WOULD RUN OUT BEFORE YOU GOT THE MONEY TO BUY MORE.: NEVER TRUE

## 2025-05-15 SDOH — SOCIAL STABILITY: SOCIAL INSECURITY: DOES ANYONE TRY TO KEEP YOU FROM HAVING/CONTACTING OTHER FRIENDS OR DOING THINGS OUTSIDE YOUR HOME?: NO

## 2025-05-15 SDOH — SOCIAL STABILITY: SOCIAL INSECURITY: ARE THERE ANY APPARENT SIGNS OF INJURIES/BEHAVIORS THAT COULD BE RELATED TO ABUSE/NEGLECT?: NO

## 2025-05-15 SDOH — SOCIAL STABILITY: SOCIAL INSECURITY: WERE YOU ABLE TO COMPLETE ALL THE BEHAVIORAL HEALTH SCREENINGS?: YES

## 2025-05-15 SDOH — ECONOMIC STABILITY: FOOD INSECURITY: HOW HARD IS IT FOR YOU TO PAY FOR THE VERY BASICS LIKE FOOD, HOUSING, MEDICAL CARE, AND HEATING?: NOT VERY HARD

## 2025-05-15 SDOH — ECONOMIC STABILITY: HOUSING INSECURITY: IN THE PAST 12 MONTHS, HOW MANY TIMES HAVE YOU MOVED WHERE YOU WERE LIVING?: 0

## 2025-05-15 SDOH — SOCIAL STABILITY: SOCIAL INSECURITY: ARE YOU OR HAVE YOU BEEN THREATENED OR ABUSED PHYSICALLY, EMOTIONALLY, OR SEXUALLY BY ANYONE?: NO

## 2025-05-15 SDOH — SOCIAL STABILITY: SOCIAL INSECURITY: HAS ANYONE EVER THREATENED TO HURT YOUR FAMILY OR YOUR PETS?: NO

## 2025-05-15 SDOH — SOCIAL STABILITY: SOCIAL INSECURITY: ABUSE: ADULT

## 2025-05-15 SDOH — SOCIAL STABILITY: SOCIAL INSECURITY: HAVE YOU HAD THOUGHTS OF HARMING ANYONE ELSE?: NO

## 2025-05-15 SDOH — SOCIAL STABILITY: SOCIAL INSECURITY: DO YOU FEEL UNSAFE GOING BACK TO THE PLACE WHERE YOU ARE LIVING?: NO

## 2025-05-15 ASSESSMENT — ACTIVITIES OF DAILY LIVING (ADL)
GROOMING: INDEPENDENT
LACK_OF_TRANSPORTATION: NO
ADEQUATE_TO_COMPLETE_ADL: YES
DRESSING YOURSELF: INDEPENDENT
HEARING - RIGHT EAR: HEARING AID
TOILETING: INDEPENDENT
LACK_OF_TRANSPORTATION: NO
JUDGMENT_ADEQUATE_SAFELY_COMPLETE_DAILY_ACTIVITIES: YES
FEEDING YOURSELF: INDEPENDENT
WALKS IN HOME: INDEPENDENT
LACK_OF_TRANSPORTATION: NO
BATHING: INDEPENDENT
PATIENT'S MEMORY ADEQUATE TO SAFELY COMPLETE DAILY ACTIVITIES?: YES
HEARING - LEFT EAR: HEARING AID

## 2025-05-15 ASSESSMENT — COGNITIVE AND FUNCTIONAL STATUS - GENERAL
WALKING IN HOSPITAL ROOM: A LITTLE
MOVING TO AND FROM BED TO CHAIR: A LITTLE
TOILETING: A LITTLE
CLIMB 3 TO 5 STEPS WITH RAILING: A LITTLE
WALKING IN HOSPITAL ROOM: A LITTLE
DRESSING REGULAR UPPER BODY CLOTHING: A LITTLE
DAILY ACTIVITIY SCORE: 24
DRESSING REGULAR LOWER BODY CLOTHING: A LITTLE
HELP NEEDED FOR BATHING: A LITTLE
WALKING IN HOSPITAL ROOM: A LITTLE
MOBILITY SCORE: 22
MOBILITY SCORE: 20
CLIMB 3 TO 5 STEPS WITH RAILING: A LITTLE
DAILY ACTIVITIY SCORE: 24
MOBILITY SCORE: 22
CLIMB 3 TO 5 STEPS WITH RAILING: A LITTLE
PATIENT BASELINE BEDBOUND: NO
DAILY ACTIVITIY SCORE: 20
STANDING UP FROM CHAIR USING ARMS: A LITTLE

## 2025-05-15 ASSESSMENT — LIFESTYLE VARIABLES
SKIP TO QUESTIONS 9-10: 1
HOW OFTEN DO YOU HAVE 6 OR MORE DRINKS ON ONE OCCASION: NEVER
HOW OFTEN DO YOU HAVE A DRINK CONTAINING ALCOHOL: NEVER
AUDIT-C TOTAL SCORE: 0
HOW MANY STANDARD DRINKS CONTAINING ALCOHOL DO YOU HAVE ON A TYPICAL DAY: PATIENT DOES NOT DRINK
AUDIT-C TOTAL SCORE: 0

## 2025-05-15 ASSESSMENT — PAIN - FUNCTIONAL ASSESSMENT: PAIN_FUNCTIONAL_ASSESSMENT: 0-10

## 2025-05-15 ASSESSMENT — PATIENT HEALTH QUESTIONNAIRE - PHQ9
2. FEELING DOWN, DEPRESSED OR HOPELESS: SEVERAL DAYS
1. LITTLE INTEREST OR PLEASURE IN DOING THINGS: NOT AT ALL
SUM OF ALL RESPONSES TO PHQ9 QUESTIONS 1 & 2: 1

## 2025-05-15 ASSESSMENT — PAIN SCALES - GENERAL
PAINLEVEL_OUTOF10: 0 - NO PAIN

## 2025-05-15 NOTE — CARE PLAN
The patient's goals for the shift include      The clinical goals for the shift include Oxygen sats to stay above 90%

## 2025-05-15 NOTE — PROGRESS NOTES
05/15/25 1409   Discharge Planning   Living Arrangements Children  (Lives in duplex, son lives in the first floor, and pt lives in upstairs.)   Support Systems Children   Assistance Needed independent prior to this admission   Type of Residence Private residence   Number of Stairs to Enter Residence 6   Number of Stairs Within Residence 12   Do you have animals or pets at home? No   Who is requesting discharge planning? Provider   Home or Post Acute Services Other (Comment)   Expected Discharge Disposition Home   Does the patient need discharge transport arranged? Yes   RoundTrip coordination needed? Yes     Social Work Note    Interdisciplinary Treatment Plan : Weaning of O2, currently with 2L NC. ADOD in 1-2days pending diureses.   - Additional information : None noted at this time   - Support : Karthikeyan Vela is listed NOK and HCPOA. Pt reported she completed HCPOA paper and got it notarized.   - Payor : [facility]  - Planned Disposition : Pending on medical outcome   - Barrier to discharge : None noted at this time.     SW met with pt at bedside to complete initial assessment for offering support and obtaining information for pt's discharge plan.  Pt is alert and fully oriented.   Pt reported she lives in 2-story house, and sonKarthikeyan lives on downstairs, and she lives on upstairs. Pt stays upstairs and rarely goes down, noted once she is upthere, Karthikeyan does take care of everything, so no needs to go down. Pt is fully independent, no need of DMEs, and Karthikeyan assists pt with transportation. Pt denied any further issues or questions on social work at the moment of assessment. SW will continue to follow and assist.     Elizabeth Muhammad, RODNEYA, LSW

## 2025-05-15 NOTE — HOSPITAL COURSE
Ms. Perez is an 89 year old F with PMH of HTN, HLD, Afib, Type 2 DM, PAD with stable L SFA occlusion, hypothyroidism, and newly diagnosed HFpEF (4/2025) who presented to the ED with shortness of breath. Being admitted for ADHF 2/2 afib RVR.     ECG on admit with Afib RVR ; rate-controlled with metoprolol, transitioned back to metop succ 50mg daily. Briefly diuresed with IVP Lasix boluses for ADHF (, pulm edema on CXR, required 2L O2 N/C after satting 67% on RA). Transitioned to oral lasix 20mg daily, weaned off O2 with walking p.ox revealing p.ox >90% on RA during ambulation and at rest. BIANCA with Cr going from 1.04 to 1.38; OP labs ordered prior to OP follow-up with HF NP. Started dapa and norberto, STOPPED home amlodipine & hchtz; tolerated well. Statin started for elevated lipids/LDL of 119. Hydroxyzine script for 30 days given for anxiety. No ischemic work-up pursued, given negative signs of ACS; HS trop negative x2, may consider on an OP basis for completion given new/unclear etiology of HFpEF (likely tachy-mediated). Suspect Afib RVR 2/2 med non-compliance; reports running out of home metop for a few days, but is a poor historian & has NOT been taking her aspirin d/t pill burden. PT/OT with MOCA score 13/30 indicated moderate cognitive impairment, however, patient limited by hearing difficulties and short attention span. Son at bedside and is involved in his mother's care, patient states she will be compliant with her medications she just needs help to organize them all. Low-intensity care recommended per PT/OT, HHC referral placed (RN/PT/OT to further assist patient with new HF diagnosis) and front-wheeled walker ordered. Close OP follow-up arranged with HF NP and MD, new PCP established upon patient's request. [id number] services used.     Discharge weight: 75.7kg    After all labs and VS were reviewed the decision was made that the patient was medically stable for discharge.  The patient was  discharged in satisfactory condition.    More than 60 minutes were spent in coordinating patient discharge.

## 2025-05-15 NOTE — PROGRESS NOTES
"Subjective Data:  Patient reports mild improvement in her SOB since admission, continues to endorse LUZ with mild activity. Per RN, satting 67% on RA while ambulating to the rest room. Now satting >92% on 2L O2 N/C. Overwhelmed with the pill burden, discussed that her finalized medication list upon discharge from the hospital should be followed to prevent further heart failure decompensation; she verbalized understanding.     OF NOTE: RN attempted to wean patient to RA, however, satting 67% on RA while ambulating to the restroom; 2L O2 N/C applied to obtain p.ox greater than 90%, patient remains safe and free of harm.    - additional IVP Lasix 40mg x1, totaling 80mg IV for the day  - STOP home amlodipine 10mg daily  - START dapa 10mg daily  - START norberto 25mg daily  - resume home metop succ 50mg daily this evening  - POCT glucose ACHS, SSI #1  - TSH/FT4 pending  - lipids tomorrow AM, discuss use of statin  - PT/OT (w/MOCA)  - monitor WBCs  - wean O2 as tolerated    Overnight Events:    No acute events overnight.      Objective Data:  Last Recorded Vitals:  Vitals:    05/15/25 0354 05/15/25 0542 05/15/25 0611 05/15/25 0741   BP: 121/78 107/68 115/75 100/65   BP Location: Right arm Right arm Right arm    Patient Position: Lying Lying Lying    Pulse: 99 74 74 78   Resp: 19 19 19 18   Temp: 36.3 °C (97.3 °F) 36.4 °C (97.5 °F) 36.4 °C (97.5 °F) 36.5 °C (97.7 °F)   TempSrc: Temporal Temporal Temporal    SpO2:   100% 97%   Weight: 76.1 kg (167 lb 12.3 oz) 76.1 kg (167 lb 12.3 oz) 76.1 kg (167 lb 12.3 oz)    Height: 1.676 m (5' 6\") 1.676 m (5' 6\") 1.676 m (5' 6\")      Last Labs:  CBC - 5/15/2025:  7:35 AM  8.3 11.6 256    37.8      CMP - 5/15/2025:  7:35 AM  9.3 7.1 11 --- 0.6   3.5 3.7 8 69      PTT - 5/14/2025:  4:19 PM  1.5   16.2 28     TROPHS   Date/Time Value Ref Range Status   05/14/2025 04:19 PM 6 0 - 34 ng/L Final   04/20/2025 05:48 AM 7 0 - 34 ng/L Final   04/20/2025 04:03 AM 6 0 - 34 ng/L Final     BNP "   Date/Time Value Ref Range Status   05/14/2025 04:19  0 - 99 pg/mL Final   04/20/2025 04:03  0 - 99 pg/mL Final     HGBA1C   Date/Time Value Ref Range Status   04/20/2025 04:03 AM 6.7 See comment % Final   07/24/2024 09:22 AM 6.7 see below % Final     LDLCALC   Date/Time Value Ref Range Status   07/24/2024 09:22  <=99 mg/dL Final     Comment:                                 Near   Borderline      AGE      Desirable  Optimal    High     High     Very High     0-19 Y     0 - 109     ---    110-129   >/= 130     ----    20-24 Y     0 - 119     ---    120-159   >/= 160     ----      >24 Y     0 -  99   100-129  130-159   160-189     >/=190       VLDL   Date/Time Value Ref Range Status   07/24/2024 09:22 AM 23 0 - 40 mg/dL Final   09/08/2023 12:03 PM 30 0 - 40 mg/dL Final   12/29/2022 10:11 AM 27 0 - 40 mg/dL Final   06/28/2021 09:41 AM 23 0 - 40 mg/dL Final      Last I/O:  No intake/output data recorded.    Past Cardiology Tests (Last 3 Years):  EKG:  ECG 12 lead 02/03/2025    Echo:  Transthoracic Echo (TTE) Complete 04/21/2025:  1. Poorly visualized anatomical structures due to suboptimal image quality.  2. Left ventricular ejection fraction is normal, by visual estimate at 60-65%.  3. There is normal right ventricular global systolic function.  4. The left atrium is mildly dilated.  5. Right ventricular systolic pressure is within normal limits.     Ejection Fractions:  EF   Date/Time Value Ref Range Status   04/21/2025 10:32 AM 63 %      Cath:  No results found for this or any previous visit from the past 1095 days.  Stress Test:  No results found for this or any previous visit from the past 1095 days.  Cardiac Imaging:  No results found for this or any previous visit from the past 1095 days.    Inpatient Medications:  Scheduled Medications[1]  PRN Medications[2]  Continuous Medications[3]    Physical Exam:  General: NAD, lying in bed  Skin: warm and dry throughout  Head/ neck: +JVD  earlobe  Cardiac: irregular rate and rhythm, S1S2, afib HR 90-low 100s on tele  Pulm: +rales in BLL, on 2L O2 N/C (not baseline)  GI: slightly firm and distended, non-tender, +BS [id number]  Extremities: +1-2 edema in BLE  Neuro: no focal neuro deficits, a+ox3-4, (disoriented to situation; easy to reorient), poor historian, [place]  Psych: appropriate mood and behavior, very pleasant      Assessment/Plan   Ms. Perez is an 89 year old F with PMH of HTN, HLD, Afib, Type 2 DM, PAD with stable L SFA occlusion, hypothyroidism, and newly diagnosed HFpEF (4/2025) who presented to the ED with shortness of breath. Being admitted for ADHF 2/2 afib RVR, Under [facility]VI Service for further management.       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 R and small-mod. L pleural effusions, pulm edema with bilat pleural effusion and evidence of pulmonary edema  - p.ox 67% on RA while ambulating, requiring 2L O2 N/C for p.ox 90% or above; wean as tolerated   - reports full compliance to her home meds with the exception of running out of her metop for the past few days   - Home diuretic: PO lasix 20mg daily   - s/p IVP Lasix 40mg x1 in ED, give additional IVP Lasix 40mg x1, totaling 80mg for the day  - transition from metop tart to home metop succ 50mg daily this evening  - START dapa 10mg daily, START norberto 25mg daily  - cont. Home hydrochlorothiazide 12.5mg daily for HTN  - management of afib as below  - establish care with HF prior to discharge   - Daily standing weights, strict I&O's, 2g  sodium diet, 2L fluid restriction    Afib RVR, improving   - newly diagnosed in (1/2025)  - EKG on arrival demonstrates a fib with RVR ()  - Prior EKG's reviewed- Patient with a fib previously. Last NSR EKG 6/14/2019 (no EKG's for 6 years between 2019 and 2025)  - suspect RVR 2/2 med non-compliance; reports running out of home metop for a few days prior to arrival, but poor historian  - metop tart 12.5mg q6hrs started on on admit, plans to transition to succ as above  - currently afib rate-controlled, HR 90-low 100s  - cont. Home eliquis 5mg BID    Leukocytosis, improving  - unclear etiology   - WBC today: 8.3 (13.4 on admit, 9.4 on 4/21)  - UA without leukocytes or nitrites  - CXR w/pulm edema/effusions/diuresis plan as above  - denies chills, afebrile    H/o HTN with current hypotension, stable  - BP on admit: 132/110  - SBP past 24hrs: 90-low 100s; denies lightheadedness/dizziness   - Cont. home hydrochlorothiazide 12.5mg daily   - STOP home amlodipine 10mg daily, optimize w/GDMT as above    HLD   - lipid panel (7/2024): total cholesterol 206 HDL 39.4  Triglyceride 113  - repeat lipid panel tomorrow AM  - NOT on statin for unclear reasons; suspect d/t polypharmacy  - discuss use of statin with patient    T2DM  - Hgb A1c: 6.7% (4/2025)  - no home meds, start DAPA as above   - ACHS accuchecks, SSI#1, hypoglycemia protocol      Hypothyroidism  - admit TSH/FT4 pending  - Continue home synthroid 62.5mcg daily     PAD with L SFA Occlusion, Stable  - Incidental finding L SFA occlusion on CTA C/A/P; asymptomatic w/poor targets, therefore, no revascularization per vasc. Surgery (2/2025)  - Cont. home ASA; OF NOTE, patient reports she was NOT taking d/t pill burden; encouraged compliance      Concerns for Cognitive Impairment  - resides at home with her son, reports med compliance but unable to recall what she takes  - a+ox3-4, (disoriented to situation; easy to reorient), poor historian   - PT/OT w/MOCA  pending    DVT ppx: home eliquis   Primary Contact: Son Karthikeyan Levy: #[phone]  DISPO: resides at home with her son, PT/OT to evaluate  - discharge in the next ?1-2 days, pending further diuresis, weaning of O2, HF, and dispo planning    All labs, vital signs, tests & imaging results, and medications were reviewed.    Seen and discussed with Dr. Watkins    Code Status:  Full Code    Evi Delgado, APRN-CNP       [1]   Scheduled medications   Medication Dose Route Frequency    apixaban  5 mg oral BID    aspirin  81 mg oral Daily    dapagliflozin propanediol  10 mg oral Daily    fluticasone  1 spray Each Nostril Daily    hydroCHLOROthiazide  12.5 mg oral Daily    levothyroxine  62.5 mcg oral Daily    metoprolol succinate XL  50 mg oral Once    [START ON 5/16/2025] metoprolol succinate XL  50 mg oral Daily    metoprolol tartrate  12.5 mg oral q6h    spironolactone  25 mg oral Daily   [2]   PRN medications   Medication    acetaminophen    sennosides-docusate sodium   [3]   Continuous Medications   Medication Dose Last Rate

## 2025-05-15 NOTE — ED PROCEDURE NOTE
Procedure  Critical Care    Performed by: Ricardo Painting MD  Authorized by: Ricardo Painting MD    Critical care provider statement:     Critical care time (minutes):  90    Critical care time was exclusive of:  Separately billable procedures and treating other patients and teaching time    Critical care was necessary to treat or prevent imminent or life-threatening deterioration of the following conditions:  Cardiac failure, respiratory failure and circulatory failure    Critical care was time spent personally by me on the following activities:  Ordering and performing treatments and interventions, ordering and review of laboratory studies, development of treatment plan with patient or surrogate, ordering and review of radiographic studies, pulse oximetry, re-evaluation of patient's condition, evaluation of patient's response to treatment, examination of patient, review of old charts, interpretation of cardiac output measurements and obtaining history from patient or surrogate    Care discussed with: admitting provider                 Ricardo Painting MD  05/14/25 1476

## 2025-05-15 NOTE — H&P
History Of Present Illness:      Ms. Perez is a 89 year old F with PMH of HTN, HLD, Afib, Type 2 DM, PAD with left superficial femoral artery occlusion, hypothyroidism who presented to the ED with shortness of breath.     Patient a poor clinical historian, accompanied by son. They report that she was having worsening shortness of breath since last night, which is what prompted her to present to the ED. Of note, patient has not been taking her metoprolol for the past 1 week as she was out and had no refills but was compliant with the rest of her meds, including eliquis, not missing any doses. She does not check her weight at home and states that her legs are a bit more swollen than before. Patient arrived hypoxic on room air. Was placed on % with improvement in her O2 sats. She was placed on BIPAP with improvement in her work of breathing, given 40 IV Lasix and now is requiring 4 L NC. She was last seen by Dr. Gillombardo in cardiology clinic on 2/5/25.     Regarding her cardiac history, her last TTE was 4/20/2025, which showed an LVEF of 60-65% with normal RV function. She has no prior cardiac cath's or other ischemic workup in our system.          Last Recorded Vitals:  Vitals:    05/14/25 1730 05/14/25 1752 05/14/25 1900 05/14/25 1901   BP: 97/71  111/83    BP Location:       Pulse: 76 89 82 86   Resp: 19 (!) 23  12   SpO2: 99%  100% 100%   Weight:           Last Labs:  CBC - 5/14/2025:  4:19 PM  13.4 13.2 269    38.6      CMP - 5/14/2025:  4:19 PM  9.7 8.5 13 --- 0.7   3.5 4.3 10 84      PTT - 5/14/2025:  4:19 PM  1.5   16.2 28     Troponin I, High Sensitivity (CMC)   Date/Time Value Ref Range Status   05/14/2025 04:19 PM 6 0 - 34 ng/L Final   04/20/2025 05:48 AM 7 0 - 34 ng/L Final   04/20/2025 04:03 AM 6 0 - 34 ng/L Final     BNP   Date/Time Value Ref Range Status   05/14/2025 04:19  (H) 0 - 99 pg/mL Final   04/20/2025 04:03  (H) 0 - 99 pg/mL Final     Hemoglobin A1C   Date/Time Value Ref  Range Status   04/20/2025 04:03 AM 6.7 (H) See comment % Final   07/24/2024 09:22 AM 6.7 (H) see below % Final     LDL Calculated   Date/Time Value Ref Range Status   07/24/2024 09:22  (H) <=99 mg/dL Final     Comment:                                 Near   Borderline      AGE      Desirable  Optimal    High     High     Very High     0-19 Y     0 - 109     ---    110-129   >/= 130     ----    20-24 Y     0 - 119     ---    120-159   >/= 160     ----      >24 Y     0 -  99   100-129  130-159   160-189     >/=190       VLDL   Date/Time Value Ref Range Status   07/24/2024 09:22 AM 23 0 - 40 mg/dL Final   09/08/2023 12:03 PM 30 0 - 40 mg/dL Final   12/29/2022 10:11 AM 27 0 - 40 mg/dL Final   06/28/2021 09:41 AM 23 0 - 40 mg/dL Final      Last I/O:  No intake/output data recorded.    Past Cardiology Tests (Last 3 Years):  EKG:  ECG 12 lead 02/03/2025      ECG 12 lead 02/03/2025      ECG 12 lead 01/29/2025      ECG 12 lead (Clinic Performed) 01/24/2025    Echo:  Transthoracic Echo (TTE) Complete 04/21/2025    Ejection Fractions:  EF   Date/Time Value Ref Range Status   04/21/2025 10:32 AM 63 %      Cath:  No results found for this or any previous visit from the past 1095 days.    Stress Test:  No results found for this or any previous visit from the past 1095 days.    Cardiac Imaging:  No results found for this or any previous visit from the past 1095 days.      Past Medical History:  She has a past medical history of Pain in leg, unspecified (02/15/2019).    Past Surgical History:  She has a past surgical history that includes Other surgical history (09/30/2022).      Social History:  She reports that she has been smoking cigarettes. She has never used smokeless tobacco. She reports that she does not currently use alcohol. She reports that she does not use drugs.    Family History:  Family History[1]     Allergies:  Lisinopril    Inpatient Medications:  Scheduled Medications[2]  PRN Medications[3]  Continuous  Medications[4]  Outpatient Medications:  Current Outpatient Medications   Medication Instructions    acetaminophen (TYLENOL) 325-650 mg, Every 4 hours PRN    amLODIPine (NORVASC) 10 mg, oral, Daily    apixaban (ELIQUIS) 5 mg, oral, 2 times daily    aspirin 81 mg, oral, Daily    Blood glucose monitoring meter Use to check blood sugar daily.    blood sugar diagnostic (Blood Glucose Test) Use to check blood sugar daily.    cholecalciferol (Vitamin D-3) 5,000 Units tablet 1 tablet, Daily    cyclobenzaprine (Flexeril) 5 mg tablet 1 tab by mouth for stiffness , as needed    fluticasone (Flonase) 50 mcg/actuation nasal spray 1 spray, Daily    furosemide (LASIX) 20 mg, oral, Daily    hydroCHLOROthiazide (HYDRODIURIL) 12.5 mg, oral, Daily    lancets misc Use to check blood sugar daily    levothyroxine (Synthroid, Levoxyl) 125 mcg tablet Take 0.5 tablet by mouth every day x6 days, and 1 full tablet on day7    metoprolol succinate XL (TOPROL-XL) 50 mg, oral, Daily, Do not crush or chew.    sennosides-docusate sodium (Lindsey-Colace) 8.6-50 mg tablet 1 tablet, Daily PRN       Physical Exam:  General: In no acute distress  HEENT: JVD to mid neck  Cardio: Irregularly irregular. Tachycardic  Pulm: Non labored breathing.   Extremities: Trace to 1+ bilaterally edema  Neuro: No focal deficits.      Assessment/Plan     Ms. Perez is a 89 year old F with PMH of HTN, HLD, Afib, Type 2 DM, PAD with left superficial femoral artery occlusion, hypothyroidism who presented to the ED with shortness of breath.     # Acute hypoxic respiratory failure  # Acute on chronic diastolic heart failure  -  (previously 184 on 4/20/25)  - Troponin negative x 2  - CXR with bilateral pleural effusion and evidence of pulmonary edema.   - VBG on admission without any lactic acidosis  - Given 40 IV Lasix in ED   - TTE 4/202/25 with LVEF 60-65%, mildly dilated LA    Plan:  > Will give another 40 IV lasix 5/15 AM (take 20 mg lasix daily at home) and assess  response. Will likely need BID dosing but does not seem significantly volume overloaded on exam  > A fib mgmt below      # A fib with RVR  - EKG on arrival demonstrates a fib with RVR  - Prior EKG's reviewed- Patient with a fib previously. Last NSR EKG 6/14/2019 (no EKG's for 6 years between 2019 and 2025)    Plan:  > Patient in a fib with RVR on admission. Likely due to medication noncompliance. Does not show signs of hypoperfusion on labs or on exam. Will start metoprolol tartrate 12.5 mg Q6 hours.  > Continue home apixaban      # Hypothyroid  - Continue home synthroid    # HTN  - Continue home hydrochlorothiazide 25 mg daily, home amlodipine 10 mg daily  - Patient warm on exam without lactic acidosis. Low concern for low output state, OK to continue home toprol 50 mg daily    # PAD  - Continue home ASA    Patient to be staffed in AM    Code Status:  Full Code      William Winter MD         [1]   Family History  Problem Relation Name Age of Onset    Parkinsonism Mother      Liver cancer Sister      Pancreatic cancer Sister     [2]   Scheduled medications   Medication Dose Route Frequency   [3]   PRN medications   Medication    oxygen   [4]   Continuous Medications   Medication Dose Last Rate

## 2025-05-15 NOTE — PROGRESS NOTES
Pharmacy Medication History Review    Елена Perez is a 89 y.o. female admitted for Acute on chronic systolic congestive heart failure. Pharmacy reviewed the patient's [id number] medications and allergies for accuracy.    No changes made to current PTA Medication List.    The list below reflects the updated PTA list.   Prior to Admission Medications   Prescriptions  Patient / Chart / Pharmacy Reported?   acetaminophen (Tylenol) 325 mg tablet  Yes   Sig: Take 1-2 tablets (325-650 mg) by mouth every   4 hours if needed for mild pain (1 - 3).   amLODIPine (Norvasc) 10 mg tablet  Yes   Sig: Take 1 tablet (10 mg) by mouth once daily.  --> Last Fill 25, #90  day   apixaban (Eliquis) 5 mg tablet  Yes   Sig: Take 1 tablet (5 mg) by mouth 2 times a day.  --> Last Fill 25, #180 /  day   aspirin 81 mg chewable tablet  Yes   Sig: Chew 1 tablet (81 mg) once daily.  --> Last Fill 25, # day; pt states not taking(?).   acetaminophen (Tylenol) 325 mg tablet  Yes   Sig: Take 1-2 tablets (325-650 mg) by mouth every   4 hours if needed for mild pain (1 - 3).      cholecalciferol (Vitamin D-3) 5,000 Units tablet  Yes   Sig: Take 1 tablet (125 mcg) by mouth once daily.  --> OTC; pt states taking.    cyclobenzaprine (Flexeril) 5 mg tablet  Yes   Si tab by mouth for stiffness , as needed  --> Last Fill 25, # day   fluticasone (Flonase) 50 mcg/actuation nasal spray  Yes   Sig: Administer 1 spray into each nostril once daily.   --> OTC; pt states taking.   furosemide (Lasix) 20 mg tablet  Yes   Sig: Take 1 tablet (20 mg) by mouth once daily.  --> Last Fill 25, # day; pt does not recognize(?).   hydroCHLOROthiazide (HYDRODiuril) 25 mg tablet  Yes   Sig: Take 0.5 tablets (12.5 mg) by mouth once daily.  --> Last Fill 25, #45 /  day   sennosides-docusate sodium (Lindsey-Colace) 8.6-50 mg tablet  Yes   Sig: Take 1 tablet by mouth once daily as needed.  --> PRN use; may use other  OTC items if needed.       levothyroxine (Synthroid, Levoxyl) 125 mcg tablet  Yes   Sig: Take 0.5 tablet by mouth every day x6 days,   and 1 full tablet on day7 (SUNDAY)  --> Last Fill 4/22/25, #52 / 91 day   metoprolol succinate XL (Toprol-XL) 50 mg 24 hr tablet  Yes   Sig: Take 1 tablet (50 mg) by mouth once daily.   --> Last Fill 4/27/25, #90 / 90 day; pt states recently out(?).              The list below reflects the updated allergy list. Please review each documented allergy for additional clarification and justification.  Allergies  Reviewed by Ricardo Painting MD on 5/14/2025        Severity Reactions Comments    Lisinopril Not Specified Swelling Patient son stated that after taking this medication patient feet became swollen.          Patient accepts M2B at discharge.   Local Pharmacy per Dispense Report if Needed:  [id number] [address] AT [facility]   P: [phone]     Sources used to complete the med history include:  Patient Interview (awake/alert; son at bedside assists;  Fair to poor historian; recognizes some with name prompts;  Discharge counseling encouraged to educate regimen.)  Current [facility] PTA Medication List  Epic Dispense Report (Pharmacy Fill Activity)  Chart Review; Recent/Past Encounters; Office Visits  (5/6 Telemedicine; 4/25 Primary Care, 4/20 Admission)  OARRS (no recent activity found)    Below are additional concerns with the patient's PTA list:  See additional comments/notes/last fill dates in table above.  No changes made to current Epic PTA Medication List.     ----------------------------------  Edson Suarez, [place], [facility]  Transitions of Care Pharmacist  Medication reconciliation complete  Please reach out via Epic Secure Chat (2h-5n) for questions,   or if no response call [phone].

## 2025-05-15 NOTE — CARE PLAN
The patient's goals for the shift include    Problem: Safety - Adult  Goal: Free from fall injury  Outcome: Progressing       The clinical goals for the shift include Oxygen sats to stay above 90%

## 2025-05-16 ENCOUNTER — HOME HEALTH ADMISSION (OUTPATIENT)
Dept: HOME HEALTH SERVICES | Facility: HOME HEALTH | Age: OVER 89
End: 2025-05-16
Payer: COMMERCIAL

## 2025-05-16 ENCOUNTER — PHARMACY VISIT (OUTPATIENT)
Dept: PHARMACY | Facility: CLINIC | Age: OVER 89
End: 2025-05-16
Payer: COMMERCIAL

## 2025-05-16 VITALS
OXYGEN SATURATION: 95 % | TEMPERATURE: 97.7 F | RESPIRATION RATE: 18 BRPM | BODY MASS INDEX: 26.82 KG/M2 | HEIGHT: 66 IN | WEIGHT: 166.9 LBS | DIASTOLIC BLOOD PRESSURE: 79 MMHG | SYSTOLIC BLOOD PRESSURE: 130 MMHG | HEART RATE: 83 BPM

## 2025-05-16 PROBLEM — I48.91 A-FIB (MULTI): Status: ACTIVE | Noted: 2025-05-16

## 2025-05-16 PROBLEM — F41.9 ANXIETY: Status: ACTIVE | Noted: 2025-05-16

## 2025-05-16 PROBLEM — I50.31 ACUTE DIASTOLIC HEART FAILURE: Status: ACTIVE | Noted: 2025-05-16

## 2025-05-16 PROBLEM — N17.9 AKI (ACUTE KIDNEY INJURY): Status: ACTIVE | Noted: 2025-05-16

## 2025-05-16 PROBLEM — R41.89 MODERATE COGNITIVE IMPAIRMENT: Status: ACTIVE | Noted: 2025-05-16

## 2025-05-16 LAB
CHOLEST SERPL-MCNC: 174 MG/DL (ref 0–199)
CHOLESTEROL/HDL RATIO: 4.5
GLUCOSE BLD MANUAL STRIP-MCNC: 143 MG/DL (ref 74–99)
GLUCOSE BLD MANUAL STRIP-MCNC: 80 MG/DL (ref 74–99)
HDLC SERPL-MCNC: 38.4 MG/DL
LDLC SERPL CALC-MCNC: 119 MG/DL
NON HDL CHOLESTEROL: 136 MG/DL (ref 0–149)
TRIGL SERPL-MCNC: 84 MG/DL (ref 0–149)
VLDL: 17 MG/DL (ref 0–40)

## 2025-05-16 PROCEDURE — 97165 OT EVAL LOW COMPLEX 30 MIN: CPT | Mod: GO

## 2025-05-16 PROCEDURE — 97116 GAIT TRAINING THERAPY: CPT | Mod: GP

## 2025-05-16 PROCEDURE — 2500000001 HC RX 250 WO HCPCS SELF ADMINISTERED DRUGS (ALT 637 FOR MEDICARE OP): Performed by: STUDENT IN AN ORGANIZED HEALTH CARE EDUCATION/TRAINING PROGRAM

## 2025-05-16 PROCEDURE — RXMED WILLOW AMBULATORY MEDICATION CHARGE

## 2025-05-16 PROCEDURE — 36415 COLL VENOUS BLD VENIPUNCTURE: CPT | Performed by: NURSE PRACTITIONER

## 2025-05-16 PROCEDURE — 80061 LIPID PANEL: CPT | Performed by: NURSE PRACTITIONER

## 2025-05-16 PROCEDURE — 97161 PT EVAL LOW COMPLEX 20 MIN: CPT | Mod: GP

## 2025-05-16 PROCEDURE — 2500000002 HC RX 250 W HCPCS SELF ADMINISTERED DRUGS (ALT 637 FOR MEDICARE OP, ALT 636 FOR OP/ED): Performed by: NURSE PRACTITIONER

## 2025-05-16 PROCEDURE — 97530 THERAPEUTIC ACTIVITIES: CPT | Mod: GO

## 2025-05-16 PROCEDURE — 82947 ASSAY GLUCOSE BLOOD QUANT: CPT

## 2025-05-16 PROCEDURE — 2500000001 HC RX 250 WO HCPCS SELF ADMINISTERED DRUGS (ALT 637 FOR MEDICARE OP): Performed by: NURSE PRACTITIONER

## 2025-05-16 PROCEDURE — 2500000002 HC RX 250 W HCPCS SELF ADMINISTERED DRUGS (ALT 637 FOR MEDICARE OP, ALT 636 FOR OP/ED): Performed by: STUDENT IN AN ORGANIZED HEALTH CARE EDUCATION/TRAINING PROGRAM

## 2025-05-16 PROCEDURE — 99239 HOSP IP/OBS DSCHRG MGMT >30: CPT | Performed by: STUDENT IN AN ORGANIZED HEALTH CARE EDUCATION/TRAINING PROGRAM

## 2025-05-16 RX ORDER — HYDROXYZINE HYDROCHLORIDE 25 MG/1
25 TABLET, FILM COATED ORAL EVERY 6 HOURS PRN
Qty: 120 TABLET | Refills: 0 | Status: SHIPPED | OUTPATIENT
Start: 2025-05-16 | End: 2025-06-15

## 2025-05-16 RX ORDER — FUROSEMIDE 20 MG/1
20 TABLET ORAL DAILY
Status: DISCONTINUED | OUTPATIENT
Start: 2025-05-17 | End: 2025-05-16 | Stop reason: HOSPADM

## 2025-05-16 RX ORDER — FUROSEMIDE 20 MG/1
20 TABLET ORAL DAILY
Qty: 30 TABLET | Refills: 1 | Status: SHIPPED | OUTPATIENT
Start: 2025-05-16 | End: 2025-07-15

## 2025-05-16 RX ORDER — METOPROLOL SUCCINATE 50 MG/1
50 TABLET, EXTENDED RELEASE ORAL DAILY
Qty: 30 TABLET | Refills: 1 | Status: SHIPPED | OUTPATIENT
Start: 2025-05-17 | End: 2025-07-16

## 2025-05-16 RX ORDER — DAPAGLIFLOZIN 10 MG/1
10 TABLET, FILM COATED ORAL DAILY
Qty: 30 TABLET | Refills: 1 | Status: SHIPPED | OUTPATIENT
Start: 2025-05-17 | End: 2025-07-16

## 2025-05-16 RX ORDER — SPIRONOLACTONE 25 MG/1
25 TABLET ORAL DAILY
Qty: 30 TABLET | Refills: 1 | Status: SHIPPED | OUTPATIENT
Start: 2025-05-17 | End: 2025-07-16

## 2025-05-16 RX ORDER — HYDROXYZINE HYDROCHLORIDE 25 MG/1
25 TABLET, FILM COATED ORAL EVERY 6 HOURS PRN
Status: DISCONTINUED | OUTPATIENT
Start: 2025-05-16 | End: 2025-05-16 | Stop reason: HOSPADM

## 2025-05-16 RX ORDER — NAPROXEN SODIUM 220 MG/1
81 TABLET, FILM COATED ORAL DAILY
Qty: 30 TABLET | Refills: 1 | Status: SHIPPED | OUTPATIENT
Start: 2025-05-16 | End: 2025-07-15

## 2025-05-16 RX ORDER — LEVOTHYROXINE SODIUM 125 UG/1
62.5 TABLET ORAL DAILY
Qty: 15 TABLET | Refills: 1 | Status: SHIPPED | OUTPATIENT
Start: 2025-05-17 | End: 2025-07-16

## 2025-05-16 RX ORDER — ATORVASTATIN CALCIUM 40 MG/1
40 TABLET, FILM COATED ORAL NIGHTLY
Qty: 30 TABLET | Refills: 1 | Status: SHIPPED | OUTPATIENT
Start: 2025-05-16 | End: 2025-07-15

## 2025-05-16 RX ADMIN — POTASSIUM CHLORIDE 40 MEQ: 1.5 POWDER, FOR SOLUTION ORAL at 00:15

## 2025-05-16 RX ADMIN — DAPAGLIFLOZIN 10 MG: 10 TABLET, FILM COATED ORAL at 08:26

## 2025-05-16 RX ADMIN — APIXABAN 5 MG: 5 TABLET, FILM COATED ORAL at 08:21

## 2025-05-16 RX ADMIN — HYDROCHLOROTHIAZIDE 12.5 MG: 25 TABLET ORAL at 08:21

## 2025-05-16 RX ADMIN — LEVOTHYROXINE SODIUM 62.5 MCG: 0.12 TABLET ORAL at 06:23

## 2025-05-16 RX ADMIN — SPIRONOLACTONE 25 MG: 25 TABLET, FILM COATED ORAL at 08:21

## 2025-05-16 RX ADMIN — METOPROLOL SUCCINATE 50 MG: 50 TABLET, EXTENDED RELEASE ORAL at 08:21

## 2025-05-16 ASSESSMENT — COGNITIVE AND FUNCTIONAL STATUS - GENERAL
HELP NEEDED FOR BATHING: A LITTLE
DAILY ACTIVITIY SCORE: 20
WALKING IN HOSPITAL ROOM: A LITTLE
MOVING FROM LYING ON BACK TO SITTING ON SIDE OF FLAT BED WITH BEDRAILS: A LITTLE
MOBILITY SCORE: 18
MOBILITY SCORE: 20
DRESSING REGULAR UPPER BODY CLOTHING: A LITTLE
DRESSING REGULAR LOWER BODY CLOTHING: A LITTLE
DRESSING REGULAR LOWER BODY CLOTHING: A LITTLE
CLIMB 3 TO 5 STEPS WITH RAILING: A LITTLE
TOILETING: A LITTLE
TOILETING: A LITTLE
TURNING FROM BACK TO SIDE WHILE IN FLAT BAD: A LITTLE
PERSONAL GROOMING: A LITTLE
DAILY ACTIVITIY SCORE: 19
MOVING TO AND FROM BED TO CHAIR: A LITTLE
DRESSING REGULAR UPPER BODY CLOTHING: A LITTLE
HELP NEEDED FOR BATHING: A LITTLE
STANDING UP FROM CHAIR USING ARMS: A LITTLE
WALKING IN HOSPITAL ROOM: A LITTLE
STANDING UP FROM CHAIR USING ARMS: A LITTLE
CLIMB 3 TO 5 STEPS WITH RAILING: A LITTLE
MOVING TO AND FROM BED TO CHAIR: A LITTLE

## 2025-05-16 ASSESSMENT — ACTIVITIES OF DAILY LIVING (ADL)
ADL_ASSISTANCE: INDEPENDENT
EFFECT OF PAIN ON DAILY ACTIVITIES: DISCOMFORT
BATHING_ASSISTANCE: MINIMAL
ADL_ASSISTANCE: INDEPENDENT

## 2025-05-16 ASSESSMENT — PAIN - FUNCTIONAL ASSESSMENT
PAIN_FUNCTIONAL_ASSESSMENT: 0-10
PAIN_FUNCTIONAL_ASSESSMENT: 0-10

## 2025-05-16 ASSESSMENT — PAIN SCALES - GENERAL
PAINLEVEL_OUTOF10: 0 - NO PAIN
PAINLEVEL_OUTOF10: 7

## 2025-05-16 ASSESSMENT — PAIN DESCRIPTION - DESCRIPTORS: DESCRIPTORS: ACHING

## 2025-05-16 NOTE — DISCHARGE INSTRUCTIONS
Hospital Summary:  xin Christiansen were treated for acute decompensated heart failure with intravenous diuretic medications to remove excess fluid. We suspect that the cause of your decompensated heart failure was secondary to your elevated heart rate and abnormal heart rhythm (or afib). After extra fluid was removed and after your heart rate was better controlled, your symptoms were alleviated. You appear to have mild anxiety, therefore, a prescription of Hydroxyzine has been provided for you to help manage that. You are currently on heart failure medications to prevent decompensation and/or progression of this disease, along with a daily diuretic to help maintain your fluid status. Your goal weight is around 165lbs. It is very important to follow-up with your Heart Failure Specialist, Dr. Jatin Hatfield and the Heart Failure Nurse Practitioner, Ofelia Black CNP, for continued outpatient management of your condition. Your kidney function decreased during this hospitalization and is likely secondary to the medications you were given; for this reason, we recommend you have lab work drawn prior to your follow-up on 5/20 to monitor your kidney function outside of the hospital.     Home Health Care  The physical and occupational therapists recommend you continue physical Home Health Care Services will be reaching out to you within the next 24-48 hrs after being discharged from the hospital. They will call you to arrange for your first home care visit. If you have any questions, comments, or concerns, feel free to reach out #[phone].     Lab Work:  - Please go to any  outpatient lab to have your blood drawn PRIOR to your follow-up appointment with the Heart Failure Nurse Practitioner on May 20th. You may go to the lab on the same day as your appointment, so the provider can discuss results with you. You are able to have these labs drawn by your Home care Nurse.     Follow-up Appointments:  - The appropriate follow-up  "appointments have already been arranged for you.  - A new PCP has been requested on your behalf   - If you have scheduling conflicts, please call #[phone] and say \"Schedule an Appointment\" to change any appointments.  - Please bring a photo ID, any copay and insurance card, a list of your current medications or discharge instructions, please arrive 15 minutes prior to your appointment.      Medications:  - Your medications and/or doses may have changed. Please take the medications listed on these instructions as prescribed until you are seen at a follow up appointment. It was a pleasure to have met and cared for you!   "

## 2025-05-16 NOTE — PROGRESS NOTES
Occupational Therapy    Evaluation and Treatment    Patient Name: Елена Perez  MRN: [id number]  Today's Date: 5/16/2025  [facility]  Time Calculation  Start Time: 0842  Stop Time: 0913  Time Calculation (min): 31 min    Assessment  IP OT Assessment  OT Assessment: Pt's abilty to complete ADLs currently limited by deficits in functional strength, cognition, activity tolerance, and dynamic balance. The pt demos the ability to complete the majority of ADL tasks with Min A - SUP and performs functional transfers with CGA-Min A without a device. Pt will benefit from continued OT while admitted to increase IND and safety prior to d/c.  Prognosis: Good  Barriers to Discharge Home: Physical needs  Physical Needs: Intermittent mobility assistance needed, Intermittent ADL assistance needed  Evaluation/Treatment Tolerance: Patient tolerated treatment well  Medical Staff Made Aware: Yes  End of Session Communication: Bedside nurse  End of Session Patient Position: Up in chair, Alarm on  Plan:  Inpatient Plan  Treatment Interventions: ADL retraining, UE strengthening/ROM, Functional transfer training, Endurance training, Cognitive reorientation, Patient/family training, Equipment evaluation/education, Compensatory technique education  OT Frequency: 3 times per week  OT Discharge Recommendations: Low intensity level of continued care (With increased SUP as available)  Equipment Recommended upon Discharge: Wheeled walker  OT Recommended Transfer Status: Minimal assist, Assist of 1  OT - OK to Discharge: Yes  OT Assessment  OT Assessment Results: Decreased ADL status, Decreased endurance, Decreased functional mobility, Decreased IADLs, Decreased upper extremity strength, Decreased safe judgment during ADL, Decreased cognition  Prognosis: Good  Barriers to Discharge: Decreased caregiver support  Evaluation/Treatment Tolerance: Patient tolerated treatment well  Medical Staff Made Aware: Yes  Strengths: Attitude of self,  "Support of Caregivers, Capable of completing ADLs semi/independent  Barriers to Participation: Housing layout, Insight into problems    Subjective   Current Problem:  1. Acute on chronic systolic congestive heart failure        2. Acute respiratory failure with hypoxia and hypercapnia        3. 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, hypothyroidism  Prior to Session Communication: Bedside nurse  Patient Position Received: Bed, 3 rail up, Alarm off, not on at start of session  Family/Caregiver Present: No  General Comment: Pt pleasant and agreeable to OT evaluation - very hard of hearing - reports \"I just feel so anxious, I would love for someone to help me with it.\"   Precautions:  Hearing/Visual Limitations: [place]  Medical Precautions: Fall precautions, Cardiac precautions  Vital Signs:   Date/Time Vitals Session Patient Position Pulse Resp SpO2 BP MAP (mmHg)    05/16/25 0842 During OT  Sitting  91  --  99 %  --  --           Pain:  Pain Assessment  Pain Assessment: 0-10  0-10 (Numeric) Pain Score: 0 - No pain  Objective   Cognition:  Overall Cognitive Status: Impaired  Arousal/Alertness: Appropriate responses to stimuli  Orientation Level: Disoriented to time  Following Commands: Follows one step commands with repetition (Partially due to [place])  Safety Judgment: Decreased awareness of need for assistance  Cognition Comments: Scores a 13/30 on the MoCA indicating moderate cognitive assessment - pt partially limited by hearing deficits and difficulty remaining focused on assessment  Memory: Exceptions to WFL  Working Memory: Impaired  Safety/Judgement: Exceptions to WFL  Complex Functional Tasks: Minimal  Novel Situations: Minimal  Routine Tasks: Minimal  Insight: " Mild  Impulsive: Mildly      Home Living:  Type of Home: House  Lives With: Alone (2 sons live in unit below pt, work)  Home Adaptive Equipment: Cane  Home Layout: One level, Laundry main level  Home Access: Stairs to enter with rails  Entrance Stairs-Number of Steps: Flight  Bathroom Shower/Tub: Tub/shower unit  Bathroom Toilet: Standard  Bathroom Equipment: Grab bars in shower, Shower chair with back   Prior Function:  Level of [place]: Independent with ADLs and functional transfers, Needs assistance with homemaking  Receives Help From: Family (Son)  ADL Assistance: Independent  Homemaking Assistance: Needs assistance  Homemaking Assistance Comments: Some assistance with laundry and cooking, pt cooks small meals and completes cleaning - son assists with transportation  Ambulatory Assistance: Independent  Vocational: Retired  Leisure: Bowling, time with family, being outside  Hand Dominance: Right  Prior Function Comments: - falls  IADL History:  Homemaking Responsibilities: Yes  Meal Prep Responsibility: Secondary  Laundry Responsibility: Secondary  Cleaning Responsibility: Primary  Bill Paying/Finance Responsibility: Primary  Shopping Responsibility: Secondary  Homemaking Comments: Son assists  Current License: No  Mode of Transportation: Family  Occupation: Retired  Leisure and Hobbies: Bowling, time with family, being outside  ADL:  Eating Assistance: Independent (Anticipated)  Grooming Assistance: Stand by (Anticipated)  Grooming Deficit: Verbal cueing, Supervision/safety  Bathing Assistance: Minimal (Anticipated)  UE Dressing Assistance: Stand by (Anticipated)  UE Dressing Deficit: Supervision/safety  LE Dressing Assistance: Stand by  LE Dressing Deficit: Verbal cueing, Supervision/safety  Toileting Assistance with Device: Stand by (Simulated)  Toileting Deficit: Supervison/safety, Verbal cueing  Activity Tolerance:  Endurance: Tolerates 10 - 20 min exercise with multiple rests  Balance:  Dynamic  Standing Balance  Dynamic Standing-Balance Support: No upper extremity supported  Dynamic Standing-Level of Assistance: Minimum assistance  Static Sitting Balance  Static Sitting-Balance Support: No upper extremity supported, Feet supported  Static Sitting-Level of Assistance: Distant supervision  Static Sitting-Comment/Number of Minutes: EOB  Static Standing Balance  Static Standing-Balance Support: No upper extremity supported  Static Standing-Level of Assistance: Contact guard  Bed Mobility/Transfers: Bed Mobility/Transfers: Bed Mobility  Bed Mobility: Yes  Bed Mobility 1  Bed Mobility 1: Supine to sitting  Level of Assistance 1: Close supervision  Bed Mobility Comments 1: HOB slight elevation  Functional Mobility  Functional Mobility Performed: Yes  Functional Mobility 1  Surface 1: Level tile  Device 1: No device  Assistance 1: Minimum assistance  Comments 1: Min A for stability as pt uses wall and grab bars for stability   and Transfers  Transfer: Yes  Transfer 1  Transfer From 1: Sit to, Stand to  Transfer to 1: Stand, Sit  Technique 1: Sit to stand, Stand to sit  Transfer Level of Assistance 1: Contact guard  Transfers 2  Transfer From 2: Bed to  Transfer to 2: Toilet  Technique 2:  (Ambulating)  Transfer Level of Assistance 2: Minimum assistance  Trials/Comments 2: Min A for stability - cues for positioning furniture walking  IADL's:   Homemaking Responsibilities: Yes  Meal Prep Responsibility: Secondary  Laundry Responsibility: Secondary  Cleaning Responsibility: Primary  Bill Paying/Finance Responsibility: Primary  Shopping Responsibility: Secondary  Homemaking Comments: Son assists  Current License: No  Mode of Transportation: Family  Occupation: Retired  Leisure and Hobbies: Bowling, time with family, being outside  Vision: Vision - Basic Assessment  Current Vision: No visual deficits   and    Sensation:  Light Touch: No apparent deficits  Strength:  Strength Comments: GONZALO Mitchell mild strength  deficits  Perception:  Inattention/Neglect: Appears intact  Coordination:  Movements are Fluid and Coordinated: Yes   Hand Function:  Hand Function  Gross Grasp: Functional  Coordination: Functional  Extremities:   RUE   RUE : Exceptions to WFL (appears 4/5 grossly), LUE   LUE: Exceptions to WFL (appears 4/5 grossly),  , and    Outcome Measures: [facility] Daily Activity  Putting on and taking off regular lower body clothing: A little  Bathing (including washing, rinsing, drying): A little  Putting on and taking off regular upper body clothing: A little  Toileting, which includes using toilet, bedpan or urinal: A little  Taking care of personal grooming such as brushing teeth: A little  Eating Meals: None  Daily Activity - Total Score: 19         , MoCA  Visuospatial/Executive: 2  Naming: 3  Memory (Score '0' as this is an Unscored Section): 0  Attention: Read List of Digits: 1  Attention: Read List of Letters: 1  Attention: Serial Sevens: 0  Language: Repeat: 1  Language: Fluency: 0  Abstraction: 1  Delayed Recall: 0  Orientation: 4  Add 1 Point if </=12 yr Education: 0  MOCA Total Score: 13   OT Adult Other Outcome Measures  MOCA Total Score: 13    Education Documentation  Body Mechanics, taught by Williams Charles OT at 5/16/2025  9:57 AM.  Learner: Patient  Readiness: Acceptance  Method: Explanation, Demonstration  Response: Verbalizes Understanding, Needs Reinforcement  Comment: ADLs and safety    Precautions, taught by Williams Charles OT at 5/16/2025  9:57 AM.  Learner: Patient  Readiness: Acceptance  Method: Explanation, Demonstration  Response: Verbalizes Understanding, Needs Reinforcement  Comment: ADLs and safety    ADL Training, taught by Williams Charles OT at 5/16/2025  9:57 AM.  Learner: Patient  Readiness: Acceptance  Method: Explanation, Demonstration  Response: Verbalizes Understanding, Needs Reinforcement  Comment: ADLs and safety    Education Comments  No comments found.    Goals:   Encounter Problems        Encounter Problems (Active)       ADLs       Patient with complete lower body dressing with independent level of assistance donning and doffing all LE clothes  with no adaptive equipment while edge of bed  and standing (Progressing)       Start:  05/16/25    Expected End:  06/06/25            Patient will complete daily grooming tasks brushing teeth and washing face/hair with modified independent level of assistance and PRN adaptive equipment while standing. (Progressing)       Start:  05/16/25    Expected End:  06/06/25            Patient will complete toileting including hygiene clothing management/hygiene with modified independent level of assistance and grab bars. (Progressing)       Start:  05/16/25    Expected End:  06/06/25               BALANCE       Pt will maintain dynamic standing balance during ADL task with modified independent level of assistance in order to demonstrate decreased risk of falling and improved postural control. (Progressing)       Start:  05/16/25    Expected End:  06/06/25               COGNITION/SAFETY       Patient will score WFL on standardized cognitive assessment with Min cues and within reasonable time frame (Progressing)       Start:  05/16/25    Expected End:  06/06/25            Patient will follow 100% Simple commands to allow improved ADL performance. (Progressing)       Start:  05/16/25    Expected End:  06/06/25            Patient will demonstrated orientation x 4. (Progressing)       Start:  05/16/25    Expected End:  06/06/25       ORIENTATION            MOBILITY       Patient will perform Functional mobility max Household distances/Community Distances with modified independent level of assistance and least restrictive device in order to improve safety and functional mobility. (Progressing)       Start:  05/16/25    Expected End:  06/06/25                   Treatment Completed on Evaluation  Activities of Daily Living:     LE Dressing  LE Dressing: Yes  Sock Level of  Assistance: Close supervision  LE Dressing Where Assessed: Edge of bed  LE Dressing Comments: Education on modified technique - pt able to doff/don socks at EOB with SBA for safety     Therapy/Activity:     Therapeutic Activity  Therapeutic Activity Performed: Yes  Therapeutic Activity 1: Functional transfer bed to chair without a device - cues for imporved positioning and safety - CGA provided to increase safety  Therapeutic Activity 2: Pt engaged in MoCA to assess functional cognition and safety for d/c planning. Pt provided good effort and earned a score of 13/30 indicating moderate cognitive impairment. She makes errors in visuospatial, attention, abstraction, delayed recall, and orientation. Recommending 24/7 SUP upon d/c and assist with ADLs/IADLs as needed.  Therapeutic Activity 3: Re-orientation activity using white board as visual cue to identify date/time/location. Pt with good follow through with min cues.     05/16/25 at 9:58 AM   Williams Charles OT   Rehab Office: [phone]

## 2025-05-16 NOTE — PROGRESS NOTES
Physical Therapy    Physical Therapy Evaluation & Treatment    Patient Name: Елена Perez  MRN: [id number]  Department: [place] [address]  Room: [facility]  Today's Date: 5/16/2025   Time Calculation  Start Time: 0910  Stop Time: 1001  Time Calculation (min): 51 min    Assessment/Plan   PT Assessment  PT Assessment Results: Decreased strength, Decreased range of motion, Decreased endurance, Impaired balance, Decreased mobility, Decreased cognition, Impaired hearing, Pain  Rehab Prognosis: Good  Barriers to Discharge Home: No anticipated barriers  Evaluation/Treatment Tolerance: Patient limited by fatigue, Patient limited by pain  Medical Staff Made Aware: Yes  Strengths: Attitude of self, Coping skills, Support of extended family/friends  Barriers to Participation: Comorbidities  End of Session Communication: Bedside nurse  Assessment Comment: Pt performed a walking pulse ox resulting in no home O2 needs and she presented with a slightly unsteady gait using a wheeled walker and safe and appropriate for low intensity level therapy and a wheeled walker after d/c.  End of Session Patient Position: Up in chair, Alarm off, caregiver present   IP OR SWING BED PT PLAN  Inpatient or Swing Bed: Inpatient  PT Plan  Treatment/Interventions: Bed mobility, Transfer training, Gait training, Stair training, Balance training, Strengthening, Endurance training, Range of motion, Therapeutic exercise, Therapeutic activity, Home exercise program  PT Plan: Ongoing PT  PT Frequency: 3 times per week  PT Discharge Recommendations: Low intensity level of continued care  Equipment Recommended upon Discharge: Wheeled walker  PT Recommended Transfer Status: Assist x1  PT - OK to Discharge: Yes    Subjective     PT Visit Info:  PT Received On: 05/16/25  General Visit Information:  General  Reason for Referral: Shortness of breath  Past Medical History Relevant to Rehab: HTN, HLD, Afib, Type 2 DM, PAD with left superficial femoral artery occlusion,  hypothyroidism  Family/Caregiver Present: Yes  Caregiver Feedback: Son present with good support.  Prior to Session Communication: Bedside nurse  Patient Position Received: Up in room, Alarm on  Preferred Learning Style: auditory, verbal, visual, written  General Comment: Pt was pleasant, cooperative and willing to participate in therapy.  Home Living:  Home Living  Type of Home: House (2-Family house up)  Lives With: Alone (Son lives in 2-family house down)  Home Adaptive Equipment: Cane (Rollator)  Home Layout: One level  Home Access: Stairs to enter with rails  Entrance Stairs-Rails: Both  Entrance Stairs-Number of Steps: 13  Bathroom Shower/Tub: Tub/shower unit  Bathroom Toilet: Handicapped height  Bathroom Equipment: Grab bars in shower, Shower chair with back  Home Living Comments: Pt receives good help from son.  Prior Level of Function:  Prior Function Per Pt/Caregiver Report  Level of [place]: Independent with ADLs and functional transfers, Independent with homemaking with ambulation  Receives Help From: Family  ADL Assistance: Independent  Homemaking Assistance: Independent  Ambulatory Assistance: Independent  Vocational: Retired  Leisure: Watch sports and walk in park  Hand Dominance: Right  Prior Function Comments: Pt was independent with all mobility without an assistive device in the household and in the community; pt stated with longer distances, she would hold son's hand.  Precautions:  Precautions  Hearing/Visual Limitations: Hearing impaired, pt wears [employer]overto hearing aides; vision WFL with reading glasses.  Medical Precautions: Fall precautions  Precautions Comment: Pt in compliance with precaution throughout therapy session.     Date/Time Vitals Session Patient Position Pulse Resp SpO2 BP MAP (mmHg)    05/16/25 0910 Pre PT  Sitting  106  --  94 %  --  --     05/16/25 0911 During PT  Sitting  106  --  94 %  --  --           Vital Signs Comment: Walking pulse ox end after amb ~65ft on RA no need  for home O2    Objective   Pain:  Pain Assessment  Pain Assessment: 0-10  0-10 (Numeric) Pain Score: 7  Pain Type: Acute pain, Chronic pain  Pain Location: Back  Pain Orientation: Lower  Pain Radiating Towards: to Roverto hips and thighs  Pain Descriptors: Aching  Pain Frequency: Intermittent  Pain Onset: Gradual  Clinical Progression: Gradually worsening (with prolonged amb)  Effect of Pain on Daily Activities: Discomfort  Patient's Stated Pain Goal: No pain  Pain Interventions: Therapeutic presence, Rest  Response to Interventions: Relief  Cognition:  Cognition  Overall Cognitive Status: Impaired  Arousal/Alertness: Appropriate responses to stimuli  Orientation Level: Disoriented to time  Following Commands: Follows one step commands without difficulty  Safety Judgment: Good awareness of safety precautions    General Assessments:  General Observation  General Observation: Pt presented with an improved gait using the wheeled walker.     Activity Tolerance  Endurance: Decreased tolerance for upright activites    Sensation  Light Touch: No apparent deficits    Strength  Strength Comments: WFL  Perception  Inattention/Neglect: Appears intact    Coordination  Movements are Fluid and Coordinated: Yes  Coordination Comment: WFL    Postural Control  Postural Control: Impaired  Posture Comment: Pt presented with good sitting posture and impaired standing posture with and without a wheeled walker due to low back pain.    Static Sitting Balance  Static Sitting-Balance Support: Bilateral upper extremity supported, Feet supported  Static Sitting-Level of Assistance: Independent  Static Sitting-Comment/Number of Minutes: Sitting in chair  Dynamic Sitting Balance  Dynamic Sitting-Balance Support: Bilateral upper extremity supported, Feet supported  Dynamic Sitting-Level of Assistance: Independent  Dynamic Sitting-Comments: Sitting in chair    Static Standing Balance  Static Standing-Balance Support: Bilateral upper extremity  supported  Static Standing-Level of Assistance: Close supervision  Static Standing-Comment/Number of Minutes: using a wheeled walker  Dynamic Standing Balance  Dynamic Standing-Balance Support: Bilateral upper extremity supported  Dynamic Standing-Level of Assistance: Close supervision  Dynamic Standing-Comments: using a wheeled walker  Functional Assessments:  Bed Mobility  Bed Mobility: No    Transfers  Transfer: Yes  Transfer 1  Transfer From 1: Sit to  Transfer to 1: Stand  Transfer Device 1: Walker (and no device)  Transfer Level of Assistance 1: Close supervision  Transfers 2  Transfer From 2: Stand to  Transfer to 2: Sit  Transfer Device 2: Walker (and no device)  Transfer Level of Assistance 2: Close supervision    Ambulation/Gait Training  Ambulation/Gait Training Performed: Yes  Ambulation/Gait Training 1  Surface 1: Level tile, Carpet  Device 1: No device  Assistance 1: Hand held assistance, Minimum assistance  Quality of Gait 1: Decreased step length, Forward flexed posture (unsteady, decreased johnna and decreased endurance)  Comments/Distance (ft) 1: 35ft  Ambulation/Gait Training 2  Surface 2: Level tile, Carpet  Device 2: Rolling walker  Assistance 2: Close supervision  Quality of Gait 2: Decreased step length (slightly unsteady, decreased johnna, decreased endurance)  Comments/Distance (ft) 2: 35ft    Stairs  Stairs: No  Extremity/Trunk Assessments:  Cervical Spine   Cervical Spine: Within Functional Limits  Lumbar Spine   Lumbar Spine : Exceptions to Funtional Limits  Lumbar Spine Comment: Decreased strength and ROM    RUE   RUE : Within Functional Limits  LUE   LUE: Within Functional Limits  RLE   RLE : Within Functional Limits  LLE   LLE : Within Functional Limits  Treatments:     Bed Mobility  Bed Mobility: No    Ambulation/Gait Training  Ambulation/Gait Training Performed: Yes  Ambulation/Gait Training 1  Surface 1: Level tile, Carpet  Device 1: No device  Assistance 1: Hand held assistance,  Minimum assistance  Quality of Gait 1: Decreased step length, Forward flexed posture (unsteady, decreased johnna and decreased endurance)  Comments/Distance (ft) 1: 35ft  Ambulation/Gait Training 2  Surface 2: Level tile, Carpet  Device 2: Rolling walker  Assistance 2: Close supervision  Quality of Gait 2: Decreased step length (slightly unsteady, decreased johnna, decreased endurance)  Comments/Distance (ft) 2: 35ft  Transfers  Transfer: Yes  Transfer 1  Transfer From 1: Sit to  Transfer to 1: Stand  Transfer Device 1: Walker (and no device)  Transfer Level of Assistance 1: Close supervision  Transfers 2  Transfer From 2: Stand to  Transfer to 2: Sit  Transfer Device 2: Walker (and no device)  Transfer Level of Assistance 2: Close supervision    Stairs  Stairs: No  Outcome Measures:  [facility] Basic Mobility  Turning from your back to your side while in a flat bed without using bedrails: A little  Moving from lying on your back to sitting on the side of a flat bed without using bedrails: A little  Moving to and from bed to chair (including a wheelchair): A little  Standing up from a chair using your arms (e.g. wheelchair or bedside chair): A little  To walk in hospital room: A little  Climbing 3-5 steps with railing: A little  Basic Mobility - Total Score: 18    Encounter Problems       Encounter Problems (Active)       Balance       STG - Maintains independent dynamic standing balance with upper extremity support using a wheeled walker. (Progressing)       Start:  05/16/25    Expected End:  05/30/25       INTERVENTIONS:1. Practice standing with minimal support.2. Educate patient about standing tolerance.3. Educate patient about independence with gait, transfers, and ADL's.4. Educate patient about use of assistive device.5. Educate patient about self-directed care.         STG - Maintains independent static standing balance with upper extremity support using a wheeled walker. (Progressing)       Start:  05/16/25     Expected End:  05/30/25       INTERVENTIONS:1. Practice standing with minimal support.2. Educate patient about standing tolerance.3. Educate patient about independence with gait, transfers, and ADL's.4. Educate patient about use of assistive device.5. Educate patient about self-directed care.            Mobility       STG - Patient will ambulate 125ft, independently using a wheeled walker. (Progressing)       Start:  05/16/25    Expected End:  05/30/25            STG - Patient will ascend and descend a flight of stairs, independently using one rail. (Progressing)       Start:  05/16/25    Expected End:  05/30/25               PT Transfers       STG - Transfer from bed to chair, independently with no device. (Progressing)       Start:  05/16/25    Expected End:  05/30/25            STG - Patient to transfer to and from sit to supine, independently. (Progressing)       Start:  05/16/25    Expected End:  05/30/25            STG - Patient will transfer sit to and from stand, independently with no device. (Progressing)       Start:  05/16/25    Expected End:  05/30/25                   Education Documentation  Body Mechanics, taught by René Arias PT at 5/16/2025 10:57 AM.  Learner: Family, Patient  Readiness: Acceptance  Method: Explanation, Demonstration  Response: Verbalizes Understanding, Demonstrated Understanding  Comment: Pt received education on how a walking pulse ox is used to determine home O2 needs and how to use a wheeled walker with gait training.    Home Exercise Program, taught by René Arias PT at 5/16/2025 10:57 AM.  Learner: Family, Patient  Readiness: Acceptance  Method: Explanation, Demonstration  Response: Verbalizes Understanding, Demonstrated Understanding  Comment: Pt received education on how a walking pulse ox is used to determine home O2 needs and how to use a wheeled walker with gait training.    Mobility Training, taught by René Arias PT at 5/16/2025 10:57 AM.  Learner:  Family, Patient  Readiness: Acceptance  Method: Explanation, Demonstration  Response: Verbalizes Understanding, Demonstrated Understanding  Comment: Pt received education on how a walking pulse ox is used to determine home O2 needs and how to use a wheeled walker with gait training.    Education Comments  No comments found.

## 2025-05-16 NOTE — HH CARE COORDINATION
Home Care received a Referral for Nursing, Physical Therapy, and Occupational Therapy. We have processed the referral for a Start of Care on 05/20.     If you have any questions or concerns, please feel free to contact us at [phone]. Follow the prompts, enter your five digit zip code, and you will be directed to your care team on [facility] 3.

## 2025-05-16 NOTE — DISCHARGE SUMMARY
Discharge Diagnosis    Acute diastolic heart failure    A-fib (Multi)    Moderate cognitive impairment    BIANCA (acute kidney injury)    Anxiety    Issues Requiring Follow-Up  Acute diastolic heart failure--->close OP follow-up w/HF NP & repeat labs for BIANCA    BIANCA (acute kidney injury)     Test Results Pending At Discharge  Pending Labs       Order Current Status    Extra Urine Gray Tube Collected (05/14/25 1926)    Urinalysis with Reflex Culture and Microscopic In process          Hospital Course  Ms. Perez is an 89 year old F with PMH of HTN, HLD, Afib, Type 2 DM, PAD with stable L SFA occlusion, hypothyroidism, and newly diagnosed HFpEF (4/2025) who presented to the ED with shortness of breath. Being admitted for ADHF 2/2 afib RVR.     ECG on admit with Afib RVR ; rate-controlled with metoprolol, transitioned back to metop succ 50mg daily. Briefly diuresed with IVP Lasix boluses for ADHF (, pulm edema on CXR, required 2L O2 N/C after satting 67% on RA). Transitioned to oral lasix 20mg daily, weaned off O2 with walking p.ox revealing p.ox >90% on RA during ambulation and at rest. BIANCA with Cr going from 1.04 to 1.38; OP labs ordered prior to OP follow-up with HF NP. Started dapa and norberto, STOPPED home amlodipine & hchtz; tolerated well. Statin started for elevated lipids/LDL of 119. Hydroxyzine script for 30 days given for anxiety. No ischemic work-up pursued, given negative signs of ACS; HS trop negative x2, may consider on an OP basis for completion given new/unclear etiology of HFpEF (likely tachy-mediated). Suspect Afib RVR 2/2 med non-compliance; reports running out of home metop for a few days, but is a poor historian & has NOT been taking her aspirin d/t pill burden. PT/OT with MOCA score 13/30 indicated moderate cognitive impairment, however, patient limited by hearing difficulties and short attention span. Son at bedside and is involved in his mother's care, patient states she will be compliant  with her medications she just needs help to organize them all. Low-intensity care recommended per PT/OT, C referral placed (RN/PT/OT to further assist patient with new HF diagnosis) and front-wheeled walker ordered. Close OP follow-up arranged with HF NP and MD, new PCP established upon patient's request. [address] services used.     Discharge weight: 75.7kg    After all labs and VS were reviewed the decision was made that the patient was medically stable for discharge.  The patient was discharged in satisfactory condition.    More than 60 minutes were spent in coordinating patient discharge.     Pertinent Physical Exam At Time of Discharge  Physical Exam  General: NAD, sitting in chair  Skin: warm and dry throughout  Head/ neck: +JVD clavicle  Cardiac: irregular rate and rhythm, S1S2, afib HR 90-low 100s on tele  Pulm: diminished in BLL, on RA  GI: slightly firm and distended (improving), non-tender, +BS [id number]  Extremities: trace, non-pitting edema in BLE  Neuro: no focal neuro deficits, a+ox3-4, (disoriented to situation; easy to reorient), poor historian, [place]  Psych: appropriate mood and behavior, very pleasant     Home Medications     Medication List      START taking these medications     atorvastatin 40 mg tablet; Commonly known as: Lipitor; Take 1 tablet (40   mg) by mouth once daily at bedtime.   dapagliflozin propanediol 10 mg tablet; Commonly known as: Farxiga; Take   1 tablet (10 mg) by mouth once daily.; Start taking on: May 17, 2025   hydrOXYzine HCL 25 mg tablet; Commonly known as: Atarax; Take 1 tablet   (25 mg) by mouth every 6 hours if needed for anxiety.   spironolactone 25 mg tablet; Commonly known as: Aldactone; Take 1 tablet   (25 mg) by mouth once daily.; Start taking on: May 17, 2025     CONTINUE taking these medications     acetaminophen 325 mg tablet; Commonly known as: Tylenol   apixaban 5 mg tablet; Commonly known as: Eliquis; Take 1 tablet (5 mg)   by mouth 2 times a day.    aspirin 81 mg chewable tablet; Chew 1 tablet (81 mg) once daily.   Blood glucose monitoring meter; Use to check blood sugar daily.   Blood Glucose Test; Generic drug: blood sugar diagnostic; Use to check   blood sugar daily.   cholecalciferol 125 mcg (5,000 units) tablet; Commonly known as: Vitamin   D-3   fluticasone 50 mcg/actuation nasal spray; Commonly known as: Flonase   furosemide 20 mg tablet; Commonly known as: Lasix; Take 1 tablet (20 mg)   by mouth once daily.   lancets misc; Use to check blood sugar daily   levothyroxine 125 mcg tablet; Commonly known as: Synthroid, Levoxyl;   Take 0.5 tablets (62.5 mcg) by mouth early in the morning.. Take on an   empty stomach at the same time each day, either 30 to 60 minutes prior to   breakfast; Start taking on: May 17, 2025   metoprolol succinate XL 50 mg 24 hr tablet; Commonly known as:   Toprol-XL; Take 1 tablet (50 mg) by mouth once daily. Do not crush or   chew.; Start taking on: May 17, 2025   sennosides-docusate sodium 8.6-50 mg tablet; Commonly known as:   Lindsey-Colace     STOP taking these medications     amLODIPine 10 mg tablet; Commonly known as: Norvasc   hydroCHLOROthiazide 25 mg tablet; Commonly known as: HYDRODiuril       Outpatient Follow-Up  Future Appointments   Date Time Provider Department Center   5/19/2025  9:45 AM Bryce Baker MD [id number] [facility]   5/20/2025 10:00 AM [id number]   6/13/2025  2:20 PM ANTONETTE Catherine-CNP [id number] [facility]   6/17/2025  9:20 AM Sabrina Menchaca MD [id number] [facility]   7/21/2025  9:30 AM ANTONETTE [facility] [id number] [facility]       Evi Delgado APRN-[facility]

## 2025-05-16 NOTE — NURSING NOTE
Discharge note:    Pt discharged home with family. Tele and IV removed. Meds to beds delivered to patient. Discharge instructions reviewed with patient and family and all questions answered. Frank Coronado RN.

## 2025-05-16 NOTE — CARE PLAN
The patient's goals for the shift include      The clinical goals for the shift include pt will remain hds until end of shift    Over the shift, the patient did make progress toward the following goals.

## 2025-05-16 NOTE — PROGRESS NOTES
Per medical team, pt is medically ready to be discharged currently.   SW met with pt and her son, Karthikeyan at bedside to offer support and discuss discharge plan. Pt is alert and fully oriented. Pt and Karthikeyan are agreeable with discharge plan for going home with homecare and a FWW. SW will continue to follow and assist with homecare and FWW once the orders are placed by team.     [UPDATE] 4:30pm, Walker is delivered to pt's bedside.     RODNEY DhillonA, LSW

## 2025-05-19 ENCOUNTER — APPOINTMENT (OUTPATIENT)
Dept: OPHTHALMOLOGY | Facility: CLINIC | Age: OVER 89
End: 2025-05-19
Payer: COMMERCIAL

## 2025-05-19 ENCOUNTER — PATIENT OUTREACH (OUTPATIENT)
Dept: PRIMARY CARE | Facility: CLINIC | Age: OVER 89
End: 2025-05-19

## 2025-05-19 NOTE — PROGRESS NOTES
Primary Care Physician: Natty Gabriel MD  Patient's Location: [place] [id number]    Date of Visit: 05/20/2025 10:00 AM EDT  Location of visit: [facility]   Type of Visit: Heart Failure New   Last visit: Visit date not found    DR. Carl Gillombardo: Cardiology    HPI / Summary:   Елена Perez is a very pleasant 89 y.o. female from [place], [place].  Елена Perez lives with her son.  She is accompanied by: (who provides additional history) son.    Елена Perez presenting post hospitalization for new acute diastolic heart failure follow up and optimization of GDMT medications. On 5/14/2025 she presented to the ED with worsening shortness of breath x 2 days. Ran out of her metoprolol 1 week ago but compliant with the rest of her medications. Increased -170s SVT on EKG, B/P 156/111 with JVD and bilateral rales. Needed BiPAP and IV lasix. Chest xray: moderate right and small to moderate left pleural effusions with diffuse interstitial lung markings suggestive of pulmonary edema.  and normal Troponin. No ischemic work-up pursued, given negative signs of ACS; HS trop negative x2, may consider on an OP basis for completion given new/unclear etiology of HFpEF (likely tachy-mediated). Suspect Afib RVR 2/2 med non-compliance; reports running out of home metoprolol for 1 week, but is a poor historian & has NOT been taking her aspirin d/t pill burden. PT/OT with MOCA score 13/30 indicated moderate cognitive impairment, however, patient limited by hearing difficulties and short attention span. Son is involved in mother's care. Prior to discharge transitioned to oral lasix 20mg daily, started Farxiga 10 mg, and spironolactone 25 mg daily for GDMT.  Discharge weight: 75.7kg     She was also admitted to [facility] on 4/20/2025 for CHF exacerbation with LVEF 60-65% with normal RVSP, no diastolic dysfunction and only mild left atrial dilation.     Today:     She denies CP, palpitations, increase  "heart rate or skipped beat. Denies LUZ, difficulty ambulating long distances or flight of stairs. Denies SOB while changing, showering or bendopnea. Denies orthopnea/PND, cough, and sleeps on her side in bed with 1 pillow. Denies lower leg edema, abdominal tightness, bloated or loss of appetite. Endorses lightheadedness after taking her morning medications daily. She complains of extreme fatigue throughout the day, especially after taking her medications. Denies dizziness, syncope, or recent falls. Medication adherent but admits to not taking her ASA 2/2 \"I take to many pills\". She did not take her morning medications yet today. Eating/drinking well. Denies NVD. Denies any nose bleed or hematochezia, on ASA and Eliquis. Home B/P 115-120's over 70. Home scale and weight stable at 165 lbs.    Hospitalizations:   5/14/2025-5/16/2025: acute on chronic heart failure exacerbation  4/20-4/21/2025: Observation- SOB sats 86%- DX w/ New onset of HF  01/29/2025-01/30/2025: New Afib (started on metoprolol and Eliquis)    Objective   Medical History (non cardiac):   # Anxiety  # hypothyroidism  # PAD w/ stable L SFA occlusion (incidental finding, ASA and lipitor was started)  # Hypertension: Last BP: 98/62 (manual).  # Hyperlipidemia: Last Tchol 5/16/2025: 174 / LDL  119 / HDL 38.4 / TRIG 84  # Type II Diabetes Mellitus: Last A1c 4/20/2025: 6.7.   # CKD3: Last BUN/Cr (GFR): 5/20/2025: 29/1.55 (32)  #    Cardiovascular History   # New DX Atrial Fibrillation on 1/24/2025 w/ elevated [id number]  # Non-ischemic Cardiomyopathy: suspect tachyarrhythmia mediated  # HFrEF /chronic diastolic Heart Failure, last EF  4/21/2025: 63%, dx'd 4/2025, ACC/AHA Stage C, NYHA II,     Surgical Hx:   #     Social Hx:  # She still smokes 2 packs of cigarette per week  # Denies ETOH or illicit drug use    Family Hx:   # Sister: liver CA  # Sister: Pancreatic Cancer  # Mother: Parkinsonism    Allergies:   RX Allergies[1]    Exam:       5/20/2025     " 4:45 PM 5/20/2025     4:34 PM 5/20/2025     4:30 PM 5/20/2025     4:09 PM 5/20/2025    12:32 PM 5/20/2025    11:30 AM   Vitals   Systolic  112  126 110 98       manual   Diastolic  96  73 73 62       manual   BP Location      Left arm   Heart Rate 89 87 75 97 127 135       apical - irregular   Temp     36.1 °C (97 °F)    Resp  13  22 16    Visit Report Report Report Report Report Report Report     Wt Readings from Last 5 Encounters:   05/16/25 75.7 kg (166 lb 14.4 oz)   04/25/25 75.2 kg (165 lb 12.8 oz)   04/20/25 78.1 kg (172 lb 3.2 oz)   02/05/25 78.3 kg (172 lb 11.2 oz)   02/03/25 78 kg (172 lb)     GEN: Pleasant, well-appearing, no acute distress,   HEENT: JVP not elevated, no icterus. Moist mucus membranes, No HJR, no thyromegaly  CHEST: No wheeze, good air movement bilaterally.  CV: Tachycardic, irregular rhythm. Normal S1, S2, NO S3/4, no m/r/g  ABD: Soft, ND, NT and normal active bowel sounds  EXT: Warm, well perfused, No LE edema, distal pulses are 2+ in upper extremities  SKIN: Dry with no rash present  NEURO: Oriented and alert x3, mood and affect appropriate and is oriented to plan    Labs:   CMP:  Recent Labs     05/20/25  1314 05/15/25  1923 05/15/25  0735 05/14/25  1619 04/21/25  0846    136 137 136 134*   K 4.9 3.8 4.1 3.9 3.4*   CL 97* 100 104 102 98   CO2 31 26 26 22 24   ANIONGAP 13 14 11 16 15   BUN 29* 22 18 16 15   CREATININE 1.55* 1.38* 1.05 1.09* 1.11*   EGFR 32* 37* 51* 49* 48*   MG 2.82* 2.28 2.21  --  1.94     Recent Labs     05/20/25  1314 05/15/25  1923 05/15/25  0735 05/14/25  1619   ALBUMIN 4.3 4.1 3.7 4.3   ALKPHOS 85  --  69 84   ALT 11  --  8 10   AST 20  --  11 13   BILITOT 0.6  --  0.6 0.7   CBC:  Recent Labs     05/20/25  1314 05/15/25  1923 05/15/25  0735 05/14/25  1619 04/21/25  0846   WBC 7.9 9.7 8.3 13.4* 9.4   HGB 14.1 11.7* 11.6* 13.2 13.4   HCT 41.2 36.8 37.8 38.6 42.0    281 256 269 255   MCV 79* 87 88 82 84   HEME/ENDO:  Recent Labs     05/15/25  0735  "04/20/25  0403 01/30/25  0008 07/24/24  0922   TSH 6.65*  --  3.66 4.10*   FREET4 1.35  --   --  1.42   HGBA1C  --  6.7*  --  6.7*    CARDIAC:   Recent Labs     05/20/25  1314 05/14/25  1619 04/20/25  0548 04/20/25  0403 02/03/25  1014   TROPHS 11 6 7 6 8   * 509*  --  184*  --      Recent Labs     05/16/25  0816 07/24/24  0922 09/08/23  1203 12/29/22  1011 06/28/21  0941   CHOL 174 206* 203* 220* 193   LDLF  --   --  138* 153* 130*   LDLCALC 119* 144*  --   --   --    HDL 38.4 39.4 35.0* 40.4 40.2   TRIG 84 113 149 134 116   MICRO: No results for input(s): \"ESR\", \"CRP\", \"PROCAL\" in the last 67937 hours.No results found for the last 90 days.      Notable Studies, reviewed:   EKG:   Recent Labs     05/20/25  1242 05/14/25  1645 02/03/25  1937 01/29/25  1753 06/14/19  1212   VENTRATE 112 85 94   < > 79   ATRRATE  --  85  --   --  79   QTCFRED 352 386 397   < > 438   QRSDUR 76 68 72   < > 88   QTCCALCB 390 409 427   < > 458    < > = values in this interval not displayed.     Encounter Date: 05/20/25   ECG 12 lead   Result Value    Ventricular Rate 112    QRS Duration 76    QT Interval 286    QTC Calculation(Bazett) 390    R Axis 55    T Axis 241    QRS Count 19    Q Onset 225    T Offset 368    QTC Fredericia 352    Narrative    Atrial fibrillation with rapid ventricular response  Septal infarct , age undetermined  Abnormal ECG  When compared with ECG of 14-MAY-2025 16:44,  Atrial fibrillation has replaced Sinus rhythm  Septal infarct is now Present  Nonspecific T wave abnormality, worse in Inferior leads  Nonspecific T wave abnormality, worse in Anterior leads     Echocardiogram:   Recent Labs     04/21/25  1032   EF 63   LVIDD 4.90   RV 29.8     Transthoracic Echo (TTE) Complete With Contrast And Ultromics 04/21/2025  CONCLUSIONS:  1. Poorly visualized anatomical structures due to suboptimal image quality.  2. Left ventricular ejection fraction is normal, by visual estimate at 60-65%.  3. There is normal right " ventricular global systolic function.  4. The left atrium is mildly dilated.  5. Right ventricular systolic pressure is within normal limits.    Coronary Angiography: No results found for this or any previous visit from the past 1800 days.    Right Heart Cath: No results found for this or any previous visit from the past 1800 days.    Cardiac Scoring: No results found for this or any previous visit from the past 1800 days.    Cardiac MRI: No results found for this or any previous visit from the past 1800 days.    Nuclear:No results found for this or any previous visit from the past 1800 days.    Metabolic Stress: No results found for this or any previous visit from the past 1800 days.      Current Outpatient Medications   Medication Instructions    acetaminophen (TYLENOL) 325-650 mg, Every 4 hours PRN    aspirin 81 mg, oral, Daily    atorvastatin (LIPITOR) 40 mg, oral, Nightly    Blood glucose monitoring meter Use to check blood sugar daily.    blood sugar diagnostic (Blood Glucose Test) Use to check blood sugar daily.    cholecalciferol (Vitamin D-3) 5,000 Units tablet 1 tablet, Daily    Eliquis 5 mg, oral, 2 times daily    Farxiga 10 mg, oral, Daily    fluticasone (Flonase) 50 mcg/actuation nasal spray 1 spray, Daily    furosemide (LASIX) 20 mg, oral, Daily    hydrOXYzine HCL (ATARAX) 25 mg, oral, Every 6 hours PRN    lancets misc Use to check blood sugar daily    levothyroxine (SYNTHROID, LEVOXYL) 62.5 mcg, oral, Daily, Take on an empty stomach at the same time each day, either 30 to 60 minutes prior to breakfast    metoprolol succinate XL (TOPROL-XL) 50 mg, oral, Daily, Do not crush or chew.    sennosides-docusate sodium (Lindsey-Colace) 8.6-50 mg tablet 1 tablet, Daily PRN    spironolactone (ALDACTONE) 25 mg, oral, Daily      Notable Therapies: *GDMT*   Class  Agent SAFETY    *ARNI* / ACE / ARB   Last BP: 98/62 (manual), Last BUN/Cr (GFR): 5/20/2025: 29/1.55 (32)    *Beta-Blocker*  Metoprolol succinate 50 mg Daily  Last HR: (!) 135 (apical - irregular)    *MRA*  Spironolactone 25 mg daily Last K: 5/20/2025: 4.9     *SGLT2*  Farxiga 10 mg daily Last A1c 4/20/2025: 6.7     Diuretic  Furosemide 20 mg daily Last BNP: 5/20/2025: 155    Hydralazine/ISDN       Digoxin  Last Digoxin level: No results found for requested labs within last 3650 days.    Anticoagulation  Eliquis 5 mg BID Last Hgb: 5/20/2025: 14.1    Anti-arrhythmic   Last QTc: 5/20/2025: 390    Antiplatelet  ASA 81 mg daily Last Platelet: 5/20/2025: 248    Lipid-Lowering  Lipitor 40 mg nightly Last Tchol 5/16/2025: 174 / LDL 9/8/2023: 138 or 5/16/2025: 119 / HDL 5/16/2025: 38.4 / TRIG 5/16/2025: 84    Other        Device(s)   EF: 4/21/2025: 63%, QRS: 5/20/2025: 76ms    Cardiac Rehab,   if EF <35/MI/OHS        HFpEF score table([id number])    IMPRESSION/PLAN:    Елена Perez is a 89 y.o. female with PMH of HTN, HLD, Afib, Type 2 DM, PAD with stable L SFA occlusion, hypothyroidism, and newly diagnosed HFpEF (4/2025) who presented to the ED with shortness of breath. She was admitted for ADHF 2/2 afib RVR under HHVI services. At the current times she has functional class  II symptoms and is euvolemic and warm on exam. She is currently in AFIB RVR with -140's and systolic blood pressure 93/62 and 98/62 manually. She did not take her morning medication. I recommended patient to go to ED 2/2 to her uncontrolled afib RVR and low blood pressure (lower then her home blood pressure readings). Even though she did not take her home metoprolol succinate today, her blood pressure was concerning if she would go home and take a dose. Also, she endorses being lightheaded after taking her home medications.     1) HFpEF /Chronic diastolic Heart Failure, NICM, last LVEF 60-65% (4/2025), Stage C, NYHA II, -suspect tachy-mediated NICM (diagnosed w/afib in 1/2025), admit HS Trop negative x 2, no prior ischemic evaluation  -CXR with bilateral pleural effusion and evidence of pulmonary  edema.   -TTE 4/202/25 -in afib: EF 60-65%, no WMAs, negative bubble study   -c/w farxiga 10 mg daily  -c/w spironolactone 25 mg daily  -c/w furosemide 20 mg dialy    2) A-fib, elevated [id number] 1/2025  -currently afib rate-controlled, HR 90-low 100s   -c/w Metoprolol succinate 50 mg daily  -c/w Eliquis 5mg BID    3) Hypertension, well-controlled  See number 1    4) PAD/hyperlipidemia/elevated CAD risk  -c/w aspirin 81 mg daily  -c/w lipitor 40 mg nightly    Send patient to the ED 2/2 Afib -140's, and blood pressure 93/62 and 98/62. She is euvolemic and possibly hypovolemic 2/2 to spironolactone 25 mg, jardiance 10 mg and lasix 20 mg daily as of last Friday. She is also lightheaded and extreme fatigue after taking her medications.            MDM: 5  Discussion of management with external MD  Chronic illnesses with progression and side effect of treatment  Review of prior external notes from each unique source            Orders:  No orders of the defined types were placed in this encounter.       Followup Appts:  Future Appointments   Date Time Provider Department Center   5/23/2025 To Be Determined Ryan العراقي, PT [facility]   5/24/2025 To Be Determined Susan Nova, OT [facility]   5/30/2025 11:00 AM ANTONETTE Choudhary-[facility] [id number] [facility]   6/13/2025  2:20 PM ANTONETTE Catherine-CNP [id number] [place]   6/17/2025  9:20 AM Sabrina Menchaca MD [id number] [facility]   7/21/2025  9:30 AM ANTONETTE South-CNP [id number] [facility]   8/26/2025  1:00 PM Rich Galloway MD [id number] [facility]   9/2/2025  1:00 PM Rich Galloway MD [id number] [facility]           ____________________________________________________________    JAYA Choudhary  Outpatient Heart Failure clinic  Division of Cardiovascular Medicine  [facility]         [1]   Allergies  Allergen Reactions    Lisinopril Swelling     Patient son stated that after  taking this medication patient feet became swollen.

## 2025-05-19 NOTE — PROGRESS NOTES
"Discharge Facility: [facility]  Discharge Diagnosis: acute diastolic heart failure; A-fib (multi); moderate cognitive impairment; BIANCA; anxiety  Admission Date:  5/14/25  Discharge Date:  5/16/25    PCP Appointment Date: 6/16/25  Specialist Appointment Date: 5/20/25 - cardiology (HF NP)  Hospital Encounter and Summary Linked: Yes  ED to Hosp-Admission (Discharged) with Mary Watkins MD PhD; Ricardo Painting MD (05/14/2025)   See discharge assessment below for further details     Wrap Up  Wrap Up Additional Comments: Successful transitions of care outreach to patient. Patient reports she is doing okay. She denies any chest pain or shortness of breath. Reports she does get winded when trying to do some exercises. She will be receiving [facility] for nursing, PT and OT. Reviewed medications. See comments under \"medications\" for details. Recommended patient trial use of a pill box (which she has but would need help setting up meds in it, at least initially) or pharmacies that pre-sort meds for you such as [facility] or [facility].  She will think about this and call me back if assistance is needed or discuss setting up pill box with the home health nurse.  Patient denies any swelling in her legs. Educated on signs and symptoms of heart failure and atrial fibrillation to watch for and when to call her doctor versus when to go to ER.  Patient is not doing daily weights. She has an appt with HF NP tomorrow. She is scheduled to establish with new PCP on 6/16/25. Offered an appt for hospital follow up with Dr. Gabriel who she is currently established with and she declined.  Patient is aware of my availabilitfy for any non-emergent questions or concerns. (5/19/2025  4:03 PM)    Engagement  Call Start Time: [id number] (5/19/2025  4:03 PM)    Medications  Medications reviewed with patient/caregiver?: Yes (5/19/2025  4:03 PM)  Is the patient having any side effects they believe may be caused by any medication additions or changes?: No " "(5/19/2025  4:03 PM)  Does the patient have all medications ordered at discharge?: Yes (5/19/2025  4:03 PM)  Care Management Interventions: Provided patient education (5/19/2025  4:03 PM)  Prescription Comments: Start: atorvastatin 40mg,   dapagliflozin 10mg,   hydroxyzine 25mg,   spironolactone 25mg  Stop: amlodipine 10mg; hctz 25mg (5/19/2025  4:03 PM)  Is the patient taking all medications as directed (includes completed medication regime)?: Yes (5/19/2025  4:03 PM)  Care Management Interventions: Provided patient education (5/19/2025  4:03 PM)  Medication Comments: Reviewed started and discontinued medications with patient. Patient states she is on \"so many pills\" and seems to be overwhelmed with this.  Reviewed the dc'ed meds and stayed on the phone while she went through meds to be sure she removed the correct ones.  She located the hctz and removed it. Could not find amlodipine, stating she must have already removed it earlier.  Reviewed her current system for managing medications. Recommended use of a pill box. She does have this but hasn't used it before. Encouraged to work with home health nurse to get pill box set up so she can try it.  Notified of pharmacies that pre-sort the meds also, such as [facility] or [facility]. (5/19/2025  4:03 PM)    Appointments  Does the patient have a primary care provider?: Yes (5/19/2025  4:03 PM)  Care Management Interventions: Verified appointment date/time/provider (5/19/2025  4:03 PM)  Has the patient kept scheduled appointments due by today?: Not applicable (5/19/2025  4:03 PM)    Self Management  What is the home health agency?: [facility] (5/19/2025  4:03 PM)  Has home health visited the patient within 72 hours of discharge?: Call prior to 72 hours (5/19/2025  4:03 PM)  What Durable Medical Equipment (DME) was ordered?: wheeled walker (5/19/2025  4:03 PM)  Has all Durable Medical Equipment (DME) been delivered?: Yes (5/19/2025  4:03 PM)    Patient Teaching  Does the " patient have access to their discharge instructions?: Yes (5/19/2025  4:03 PM)  Care Management Interventions: Reviewed instructions with patient (5/19/2025  4:03 PM)  What is the patient's perception of their health status since discharge?: Improving (5/19/2025  4:03 PM)  Is the patient/caregiver able to teach back the hierarchy of who to call/visit for symptoms/problems? PCP, Specialist, Home Health nurse, Urgent Care, ED, 911: Yes (5/19/2025  4:03 PM)

## 2025-05-20 ENCOUNTER — CLINICAL SUPPORT (OUTPATIENT)
Dept: EMERGENCY MEDICINE | Facility: HOSPITAL | Age: OVER 89
End: 2025-05-20
Payer: COMMERCIAL

## 2025-05-20 ENCOUNTER — APPOINTMENT (OUTPATIENT)
Dept: CARDIOLOGY | Facility: HOSPITAL | Age: OVER 89
End: 2025-05-20
Payer: COMMERCIAL

## 2025-05-20 ENCOUNTER — APPOINTMENT (OUTPATIENT)
Dept: RADIOLOGY | Facility: HOSPITAL | Age: OVER 89
End: 2025-05-20
Payer: COMMERCIAL

## 2025-05-20 ENCOUNTER — HOSPITAL ENCOUNTER (EMERGENCY)
Facility: HOSPITAL | Age: OVER 89
Discharge: HOME | End: 2025-05-20
Attending: EMERGENCY MEDICINE
Payer: COMMERCIAL

## 2025-05-20 ENCOUNTER — OFFICE VISIT (OUTPATIENT)
Dept: CARDIOLOGY | Facility: HOSPITAL | Age: OVER 89
End: 2025-05-20
Payer: COMMERCIAL

## 2025-05-20 ENCOUNTER — HOME CARE VISIT (OUTPATIENT)
Dept: HOME HEALTH SERVICES | Facility: HOME HEALTH | Age: OVER 89
End: 2025-05-20

## 2025-05-20 VITALS
DIASTOLIC BLOOD PRESSURE: 96 MMHG | OXYGEN SATURATION: 97 % | HEART RATE: 89 BPM | RESPIRATION RATE: 13 BRPM | SYSTOLIC BLOOD PRESSURE: 112 MMHG | TEMPERATURE: 97 F

## 2025-05-20 VITALS — HEART RATE: 135 BPM | OXYGEN SATURATION: 99 % | SYSTOLIC BLOOD PRESSURE: 98 MMHG | DIASTOLIC BLOOD PRESSURE: 62 MMHG

## 2025-05-20 DIAGNOSIS — R00.0 RAPID HEART RATE: ICD-10-CM

## 2025-05-20 DIAGNOSIS — I48.91 ATRIAL FIBRILLATION WITH RVR (MULTI): Primary | ICD-10-CM

## 2025-05-20 DIAGNOSIS — I50.31 ACUTE DIASTOLIC HEART FAILURE: ICD-10-CM

## 2025-05-20 DIAGNOSIS — I48.19 ATRIAL FIBRILLATION, PERSISTENT (MULTI): Primary | ICD-10-CM

## 2025-05-20 LAB
ALBUMIN SERPL BCP-MCNC: 4.3 G/DL (ref 3.4–5)
ALP SERPL-CCNC: 85 U/L (ref 33–136)
ALT SERPL W P-5'-P-CCNC: 11 U/L (ref 7–45)
ANION GAP SERPL CALC-SCNC: 13 MMOL/L (ref 10–20)
AST SERPL W P-5'-P-CCNC: 20 U/L (ref 9–39)
BASOPHILS # BLD AUTO: 0.02 X10*3/UL (ref 0–0.1)
BASOPHILS NFR BLD AUTO: 0.3 %
BILIRUB SERPL-MCNC: 0.6 MG/DL (ref 0–1.2)
BNP SERPL-MCNC: 155 PG/ML (ref 0–99)
BUN SERPL-MCNC: 29 MG/DL (ref 6–23)
CALCIUM SERPL-MCNC: 9.7 MG/DL (ref 8.6–10.6)
CARDIAC TROPONIN I PNL SERPL HS: 11 NG/L (ref 0–34)
CHLORIDE SERPL-SCNC: 97 MMOL/L (ref 98–107)
CO2 SERPL-SCNC: 31 MMOL/L (ref 21–32)
CREAT SERPL-MCNC: 1.55 MG/DL (ref 0.5–1.05)
EGFRCR SERPLBLD CKD-EPI 2021: 32 ML/MIN/1.73M*2
EOSINOPHIL # BLD AUTO: 0.04 X10*3/UL (ref 0–0.4)
EOSINOPHIL NFR BLD AUTO: 0.5 %
ERYTHROCYTE [DISTWIDTH] IN BLOOD BY AUTOMATED COUNT: 16.1 % (ref 11.5–14.5)
GLUCOSE SERPL-MCNC: 124 MG/DL (ref 74–99)
HCT VFR BLD AUTO: 41.2 % (ref 36–46)
HGB BLD-MCNC: 14.1 G/DL (ref 12–16)
IMM GRANULOCYTES # BLD AUTO: 0.02 X10*3/UL (ref 0–0.5)
IMM GRANULOCYTES NFR BLD AUTO: 0.3 % (ref 0–0.9)
LYMPHOCYTES # BLD AUTO: 2.73 X10*3/UL (ref 0.8–3)
LYMPHOCYTES NFR BLD AUTO: 34.3 %
MAGNESIUM SERPL-MCNC: 2.82 MG/DL (ref 1.6–2.4)
MCH RBC QN AUTO: 27.2 PG (ref 26–34)
MCHC RBC AUTO-ENTMCNC: 34.2 G/DL (ref 32–36)
MCV RBC AUTO: 79 FL (ref 80–100)
MONOCYTES # BLD AUTO: 0.77 X10*3/UL (ref 0.05–0.8)
MONOCYTES NFR BLD AUTO: 9.7 %
NEUTROPHILS # BLD AUTO: 4.38 X10*3/UL (ref 1.6–5.5)
NEUTROPHILS NFR BLD AUTO: 54.9 %
NRBC BLD-RTO: 0 /100 WBCS (ref 0–0)
PLATELET # BLD AUTO: 248 X10*3/UL (ref 150–450)
POTASSIUM SERPL-SCNC: 4.9 MMOL/L (ref 3.5–5.3)
PROT SERPL-MCNC: 8.8 G/DL (ref 6.4–8.2)
Q ONSET: 225 MS
QRS COUNT: 19 BEATS
QRS DURATION: 76 MS
QT INTERVAL: 286 MS
QTC CALCULATION(BAZETT): 390 MS
QTC FREDERICIA: 352 MS
R AXIS: 55 DEGREES
RBC # BLD AUTO: 5.19 X10*6/UL (ref 4–5.2)
SODIUM SERPL-SCNC: 136 MMOL/L (ref 136–145)
T AXIS: 241 DEGREES
T OFFSET: 368 MS
VENTRICULAR RATE: 112 BPM
WBC # BLD AUTO: 7.9 X10*3/UL (ref 4.4–11.3)

## 2025-05-20 PROCEDURE — 85025 COMPLETE CBC W/AUTO DIFF WBC: CPT | Performed by: PHYSICIAN ASSISTANT

## 2025-05-20 PROCEDURE — 36415 COLL VENOUS BLD VENIPUNCTURE: CPT | Performed by: PHYSICIAN ASSISTANT

## 2025-05-20 PROCEDURE — 83735 ASSAY OF MAGNESIUM: CPT | Performed by: PHYSICIAN ASSISTANT

## 2025-05-20 PROCEDURE — 1160F RVW MEDS BY RX/DR IN RCRD: CPT

## 2025-05-20 PROCEDURE — 3078F DIAST BP <80 MM HG: CPT

## 2025-05-20 PROCEDURE — 84484 ASSAY OF TROPONIN QUANT: CPT | Performed by: EMERGENCY MEDICINE

## 2025-05-20 PROCEDURE — 99215 OFFICE O/P EST HI 40 MIN: CPT

## 2025-05-20 PROCEDURE — 2500000001 HC RX 250 WO HCPCS SELF ADMINISTERED DRUGS (ALT 637 FOR MEDICARE OP)

## 2025-05-20 PROCEDURE — 1111F DSCHRG MED/CURRENT MED MERGE: CPT

## 2025-05-20 PROCEDURE — 93005 ELECTROCARDIOGRAM TRACING: CPT

## 2025-05-20 PROCEDURE — 1159F MED LIST DOCD IN RCRD: CPT

## 2025-05-20 PROCEDURE — 83880 ASSAY OF NATRIURETIC PEPTIDE: CPT | Performed by: PHYSICIAN ASSISTANT

## 2025-05-20 PROCEDURE — 99212 OFFICE O/P EST SF 10 MIN: CPT

## 2025-05-20 PROCEDURE — 3074F SYST BP LT 130 MM HG: CPT

## 2025-05-20 PROCEDURE — 71045 X-RAY EXAM CHEST 1 VIEW: CPT

## 2025-05-20 PROCEDURE — 71045 X-RAY EXAM CHEST 1 VIEW: CPT | Mod: FOREIGN READ | Performed by: RADIOLOGY

## 2025-05-20 PROCEDURE — 84075 ASSAY ALKALINE PHOSPHATASE: CPT | Performed by: PHYSICIAN ASSISTANT

## 2025-05-20 PROCEDURE — 93010 ELECTROCARDIOGRAM REPORT: CPT | Performed by: INTERNAL MEDICINE

## 2025-05-20 PROCEDURE — 99285 EMERGENCY DEPT VISIT HI MDM: CPT | Mod: 25 | Performed by: EMERGENCY MEDICINE

## 2025-05-20 RX ORDER — METOPROLOL SUCCINATE 50 MG/1
50 TABLET, EXTENDED RELEASE ORAL ONCE
Status: COMPLETED | OUTPATIENT
Start: 2025-05-20 | End: 2025-05-20

## 2025-05-20 RX ADMIN — METOPROLOL SUCCINATE 50 MG: 50 TABLET, EXTENDED RELEASE ORAL at 16:09

## 2025-05-20 ASSESSMENT — HEART SCORE
HEART SCORE: 4
AGE: 65+
HISTORY: SLIGHTLY SUSPICIOUS
TROPONIN: LESS THAN OR EQUAL TO NORMAL LIMIT
ECG: NORMAL
RISK FACTORS: >2 RISK FACTORS OR HX OF ATHEROSCLEROTIC DISEASE

## 2025-05-20 ASSESSMENT — COLUMBIA-SUICIDE SEVERITY RATING SCALE - C-SSRS
6. HAVE YOU EVER DONE ANYTHING, STARTED TO DO ANYTHING, OR PREPARED TO DO ANYTHING TO END YOUR LIFE?: NO
2. HAVE YOU ACTUALLY HAD ANY THOUGHTS OF KILLING YOURSELF?: NO
1. IN THE PAST MONTH, HAVE YOU WISHED YOU WERE DEAD OR WISHED YOU COULD GO TO SLEEP AND NOT WAKE UP?: NO

## 2025-05-20 NOTE — ED TRIAGE NOTES
PT TO ED FROM INPATIENT CARDIOLOGY VISIT. PT NEWLY DIAGNOSED WITH CHF. PT SON STATES THAT OFFICE WAS WORRIED ABOUT HR. PER CARDIOLOGY NOTE PT  BPM. HR IN TRIAGE 127 BPM. PT SON STATES THAT PT DID NOT TAKE METOPROLOL THIS AM DUE TO APPOINTMENT. PT DENIES CP AND SOB. HX OF AFIB ON ELIQUIS.

## 2025-05-20 NOTE — DISCHARGE INSTRUCTIONS
You were seen in the emergency department today for a high heart rate.  You were given your home dose of metoprolol.  Your labs did not show any changes.  If you develop shortness of breath, chest pain, or other concerning symptoms, please return to the emergency department.  Please follow-up with your PCP and cardiologist as needed.

## 2025-05-20 NOTE — ED PROVIDER NOTES
Emergency Department Provider Note        History of Present Illness     History provided by: Patient  Limitations to History: None  External Records Reviewed with Brief Summary: Outpatient progress note from today with cardiology which showed patient seen posthospitalization where she was found to have new HFpEF, optimization of GDMT.    HPI:  Елена Perez is a 89 y.o. female with PMH of HTN, HLD, Afib, Type 2 DM, PAD with stable L SFA occlusion, hypothyroidism, and newly diagnosed HFpEF (4/2025) who presents from outpatient cardiology appointment for concern for A-fib RVR.  Patient was found to have a heart rate of 170 and BP in the 80s.  She reports that she had not yet taken her medications this morning because she had not had anything to eat before her appointment.  She was planning to come home and take her medications, including Toprol-XL.  Per her son, there was concern for slight dehydration at the appointment, but she has since had a full size lemonade to drink.  She denies lightheadedness, dizziness, shortness of breath, and chest pain.  She states that she has otherwise been in her usual state of health, denying fevers, chills, cough, abdominal pain, urinary symptoms, and leg swelling.    Physical Exam   Triage vitals:  T 36.1 °C (97 °F)  HR (!) 127  /73  RR 16  O2 98 % None (Room air)    Physical Exam  Vitals and nursing note reviewed.   Constitutional:       General: She is not in acute distress.     Appearance: She is not ill-appearing or toxic-appearing.   Cardiovascular:      Rate and Rhythm: Tachycardia present. Rhythm irregular.      Pulses: Normal pulses.      Heart sounds: Normal heart sounds.   Pulmonary:      Effort: Pulmonary effort is normal.      Breath sounds: Normal breath sounds.   Abdominal:      General: There is no distension.      Palpations: Abdomen is soft.      Tenderness: There is no abdominal tenderness.   Musculoskeletal:         General: Normal range of motion.       Right lower leg: No edema.      Left lower leg: No edema.   Skin:     General: Skin is warm and dry.      Capillary Refill: Capillary refill takes less than 2 seconds.   Neurological:      Mental Status: She is alert and oriented to person, place, and time.   Psychiatric:         Mood and Affect: Mood normal.         Behavior: Behavior normal.          Medical Decision Making & ED Course   Medical Decision Makin y.o. female with PMH of HTN, HLD, Afib, Type 2 DM, PAD with stable L SFA occlusion, hypothyroidism, and newly diagnosed HFpEF (2025) who presents from outpatient cardiology appointment for concern for A-fib RVR.  Patient is otherwise in her usual state of health and has not yet taken her metoprolol today.  EKG did reveal atrial fibrillation with a heart rate of 112.  She is placed on telemetry, and heart rate varied from 90s to 120s initially.  On examination, she does not appear fluid overloaded and is hemodynamically stable.  No concern for predisposing infection or illness causing elevated heart rate.  She was given a dose of her home metoprolol succinate and monitored to ensure blood pressure did not drop precipitously with medication.  Labs were ordered from triage.  No major electrolyte disturbances were noted on CMP and mag.  CBC was grossly unremarkable.  BNP was elevated at 155, however significantly improved from 500s on recent admission.  Troponin was not elevated.  Chest x-ray did not reveal any acute cardiopulmonary process.  She remained hemodynamically stable with an improved heart rate in the 80s to 90s, with a stable blood pressure.  She was requesting discharge, and verbalized understanding of return precautions and discharge instructions prior to leaving the ED.  She was advised to follow-up with her PCP and cardiologist as needed.  She has an appointment with cardiology scheduled in 10 days.  ----  Scoring Tools Utilized: HEART Score: 4       Differential diagnoses considered  include but are not limited to: A-fib RVR, dehydration, infection, electrolyte disturbance, fluid overload     Social Determinants of Health which Significantly Impact Care: None identified     EKG Independent Interpretation: The EKG obtained at 1242 was independently interpreted by myself. It demonstrates atrial fibrillation with a ventricular rate of 112.  Normal axis. Intervals normal. ST segments showed no signs of acute ischemia.  A-fib has replaced sinus rhythm compared to prior EKG from 5/14/2025.  Previous EKGs reviewed with A-fib, no significant changes.    Independent Result Review and Interpretation: Relevant laboratory and radiographic results were reviewed and independently interpreted by myself.  As necessary, they are commented on in the ED Course.    Chronic conditions affecting the patient's care: As documented above in [facility]    The patient was discussed with the following consultants/services: None    Care Considerations: As documented above in [facility]    ED Course:  ED Course as of 05/20/25 1909   Tue May 20, 2025   162 Emergency Medicine Senior Resident Attestation:   Patient is a 89-year-old female with history of diastolic heart failure, PAD, hyperlipidemia, hypertension, atrial fibrillation on Eliquis and metoprolol who presents from cardiology office due to concern for A-fib RVR.  Patient states her heart rate was in the 170s at cardiology clinic and they said her blood pressure was low therefore she was advised to come into the emergency department for treatment and evaluation.  She denies any symptomatic tachycardia at the time such as palpitations, chest pain, shortness of breath, dizziness, lightheadedness, syncopal events.  She currently denies any chest pain, palpitations, lightheadedness.  She is overall well-appearing.  She is currently in A-fib RVR however has a waxing and waning heart rate between  with normotension.  On examination, lungs are clear to auscultation, no significant  lower extremity edema appreciated, no chest wall tenderness or abdominal tenderness.  Patient had a provider in triage examination done.  Lab work from this reviewed, creatinine is slightly elevated at 1.55 overall similar to prior.  Electrolytes appear to be within normal limits.  BNP is slightly elevated and she does have a history of CHF, I do not suspect acute exacerbation.  Magnesium is 2.82.  Troponin was collected, she does not have ACS symptoms at this time.  Patient was given her home dose of metoprolol 50 mg and monitor in the emergency department.  She is tolerating p.o.  Anticipate discharge with close follow-up.      Zulema Sanders, PGY-3, DO   [SA]   1905 Troponin I, High Sensitivity (CMC): 11 [MG]      ED Course User Index  [MG] Rosa Ch MD  [SA] Zulema Sanders DO         Diagnoses as of 05/20/25 1909   Atrial fibrillation with RVR (Multi)   Rapid heart rate     Disposition   As a result of the work-up, the patient was discharged home.  she was informed of her diagnosis and instructed to come back with any concerns or worsening of condition.  she and was agreeable to the plan as discussed above.  she was given the opportunity to ask questions.  All of the patient's questions were answered.    Patient seen and discussed with ED attending physician.    Rosa Ch MD  Emergency Medicine      Rosa Ch MD  Resident  05/20/25 1910

## 2025-05-20 NOTE — ED TRIAGE NOTES
Emergency Department Encounter  [facility] EMERGENCY MEDICINE    Patient: Елена Perez  MRN: [id number]  : 1934  Date of Evaluation: 2025  Triage Provider: Charlene Villalobos PA-C      Chief Complaint       Chief Complaint   Patient presents with    Rapid Heart Rate     [place]    (Location/Symptom, Timing/Onset, Context/Setting, Quality, Duration, Modifying Factors, Severity) Note limiting factors.       Елена Perez is a 89 y.o. female who presents to the emergency department complaining of no symptoms however was sent here by cardiology office due to A-fib RVR, patient has a known history of A-fib, taking her blood thinner as prescribed, she took her Eliquis this morning but did not take her home metoprolol that she is supposed to be on.  At the cardiologist office today, her blood pressure was low and they were worried about giving her anything that could potentially lower her blood pressure and sent her to the ER.  Patient states she feels fine, denies dizziness lightheadedness chest pain or shortness of breath.  Last EF was 60 to 65% in 2025.        Past History   Medical History[1]  Surgical History[2]  Social History[3]    Medications/Allergies     Previous Medications    ACETAMINOPHEN (TYLENOL) 325 MG TABLET    Take 1-2 tablets (325-650 mg) by mouth every 4 hours if needed for mild pain (1 - 3).    APIXABAN (ELIQUIS) 5 MG TABLET    Take 1 tablet (5 mg) by mouth 2 times a day.    ASPIRIN 81 MG CHEWABLE TABLET    Chew 1 tablet (81 mg) once daily.    ATORVASTATIN (LIPITOR) 40 MG TABLET    Take 1 tablet (40 mg) by mouth once daily at bedtime.    BLOOD GLUCOSE MONITORING METER    Use to check blood sugar daily.    BLOOD SUGAR DIAGNOSTIC (BLOOD GLUCOSE TEST)    Use to check blood sugar daily.    CHOLECALCIFEROL (VITAMIN D-3) 5,000 UNITS TABLET    Take 1 tablet (125 mcg) by mouth once daily.    DAPAGLIFLOZIN PROPANEDIOL (FARXIGA) 10 MG TABLET    Take 1 tablet (10 mg) by mouth once  daily.    FLUTICASONE (FLONASE) 50 MCG/ACTUATION NASAL SPRAY    Administer 1 spray into each nostril once daily. Shake gently. Before first use, prime pump. After use, clean tip and replace cap.    FUROSEMIDE (LASIX) 20 MG TABLET    Take 1 tablet (20 mg) by mouth once daily.    HYDROXYZINE HCL (ATARAX) 25 MG TABLET    Take 1 tablet (25 mg) by mouth every 6 hours if needed for anxiety.    LANCETS MISC    Use to check blood sugar daily    LEVOTHYROXINE (SYNTHROID, LEVOXYL) 125 MCG TABLET    Take 0.5 tablets (62.5 mcg) by mouth early in the morning.. Take on an empty stomach at the same time each day, either 30 to 60 minutes prior to breakfast    METOPROLOL SUCCINATE XL (TOPROL-XL) 50 MG 24 HR TABLET    Take 1 tablet (50 mg) by mouth once daily. Do not crush or chew.    SENNOSIDES-DOCUSATE SODIUM (DENIZ-COLACE) 8.6-50 MG TABLET    Take 1 tablet by mouth once daily as needed.    SPIRONOLACTONE (ALDACTONE) 25 MG TABLET    Take 1 tablet (25 mg) by mouth once daily.     Allergies[4]     Physical Exam       ED Triage Vitals [05/20/25 1232]   Temperature Heart Rate Respirations BP   36.1 °C (97 °F) (!) 127 16 110/73      Pulse Ox Temp src Heart Rate Source Patient Position   98 % -- Monitor --      BP Location FiO2 (%)     -- --         Physical Exam    Focused PE    GENERAL:  The patient appears nourished and normally developed. Vital signs as documented.     PULMONARY:  Lungs are clear to auscultation, without any respiratory distress. Able to speak full sentences, no accessory muscle use    CARDIAC:   Rate in the 120s, irregularly irregular. no murmurs, rubs or gallops    ABDOMEN:  Soft, non distended, non tender, BS positive x 4 quadrants, No rebound or guarding, no peritoneal signs, may be limited by patient positioning in triage    MUSCULOSKELETAL:   Able to ambulate, Non edematous, with no obvious deformities. Pulses intact distal    SKIN:   Good color, with no significant rashes.  No pallor.    NEURO:  Alert and  oriented, speech clear and coherent    Plan     CBC, CMP, magnesium, troponin, BNP and chest x-ray, will defer medications to team in back once patient is on telemetry.      For the remainder of the patient's workup and ED course, please see the main ED provider note.  We discussed need for diagnostic testing including lab studies and imaging.  We also discussed that they may be asked to wait in the waiting room while these test are pending.  They understand that if they choose to leave without having the testing completed or resulted that we cannot rule out acute life-threatening illnesses and the risks involved to lead to worsening condition, permanent disability or even death.        Comment: Please note this report has been produced using speech recognition software and may contain errors related to that system including errors in grammar, punctuation, and spelling, as well as words and phrases that may be inappropriate.  If there are any questions or concerns please feel free to contact the dictating provider for clarification.    Charlene Villalobos PA-C         [1]   Past Medical History:  Diagnosis Date    Pain in leg, unspecified 02/15/2019    Leg pain   [2]   Past Surgical History:  Procedure Laterality Date    OTHER SURGICAL HISTORY  09/30/2022    No history of surgery   [3]   Social History  Socioeconomic History    Marital status: Single   Tobacco Use    Smoking status: Every Day     Types: Cigarettes    Smokeless tobacco: Never    Tobacco comments:     7/23/24: 3/4 ppd , smokes only at home   Substance and Sexual Activity    Alcohol use: Not Currently    Drug use: Never   Social History Narrative    Lives in a home , son lives downstairs     Both share cooking    Son helps with laundry     She is able to household chores             Was born in [place] , raised in [place], [place]. Moved to [place], [place] at age [age]     Three adult children , 1 son and 2 daughters. Son - as above     One daughter lives in  [place] the other daughter in [place]      Social Drivers of Health     Financial Resource Strain: Low Risk  (5/15/2025)    Overall Financial Resource Strain (CARDIA)     Difficulty of Paying Living Expenses: Not very hard   Food Insecurity: No Food Insecurity (5/15/2025)    Hunger Vital Sign     Worried About Running Out of Food in the Last Year: Never true     Ran Out of Food in the Last Year: Never true   Transportation Needs: No Transportation Needs (5/15/2025)    PRAPARE - Transportation     Lack of Transportation (Medical): No     Lack of Transportation (Non-Medical): No   Physical Activity: Inactive (4/20/2025)    Exercise Vital Sign     Days of Exercise per Week: 0 days     Minutes of Exercise per Session: 0 min   Stress: No Stress Concern Present (4/20/2025)    [employer] - Occupational Stress Questionnaire     Feeling of Stress : Not at all   Social Connections: Moderately Isolated (4/20/2025)    Social Connection and Isolation Panel [NHANES]     Frequency of Communication with Friends and Family: More than three times a week     Frequency of Social Gatherings with Friends and Family: More than three times a week     Attends Bahai Services: More than 4 times per year     Active Member of Clubs or Organizations: No     Attends Club or Organization Meetings: Never     Marital Status:    Intimate Partner Violence: Not At Risk (5/15/2025)    Humiliation, Afraid, Rape, and Kick questionnaire     Fear of Current or Ex-Partner: No     Emotionally Abused: No     Physically Abused: No     Sexually Abused: No   Housing Stability: Low Risk  (5/15/2025)    Housing Stability Vital Sign     Unable to Pay for Housing in the Last Year: No     Number of Times Moved in the Last Year: 0     Homeless in the Last Year: No   [4]   Allergies  Allergen Reactions    Lisinopril Swelling     Patient son stated that after taking this medication patient feet became swollen.

## 2025-05-21 ENCOUNTER — HOSPITAL ENCOUNTER (OUTPATIENT)
Dept: CARDIOLOGY | Facility: HOSPITAL | Age: OVER 89
Discharge: HOME | End: 2025-05-21
Payer: COMMERCIAL

## 2025-05-21 ENCOUNTER — TELEPHONE (OUTPATIENT)
Dept: PRIMARY CARE | Facility: CLINIC | Age: OVER 89
End: 2025-05-21
Payer: COMMERCIAL

## 2025-05-21 NOTE — TELEPHONE ENCOUNTER
Patient insurance called to inform Dr. Gabriel that during the pt nest OV her medications need to be reviewed please

## 2025-05-22 ENCOUNTER — HOME CARE VISIT (OUTPATIENT)
Dept: HOME HEALTH SERVICES | Facility: HOME HEALTH | Age: OVER 89
End: 2025-05-22

## 2025-05-22 PROCEDURE — 93005 ELECTROCARDIOGRAM TRACING: CPT

## 2025-05-23 ENCOUNTER — HOME CARE VISIT (OUTPATIENT)
Dept: HOME HEALTH SERVICES | Facility: HOME HEALTH | Age: OVER 89
End: 2025-05-23
Payer: COMMERCIAL

## 2025-05-23 VITALS
HEIGHT: 66 IN | WEIGHT: 164 LBS | DIASTOLIC BLOOD PRESSURE: 70 MMHG | TEMPERATURE: 98.2 F | BODY MASS INDEX: 26.36 KG/M2 | SYSTOLIC BLOOD PRESSURE: 115 MMHG | OXYGEN SATURATION: 98 % | RESPIRATION RATE: 16 BRPM | HEART RATE: 90 BPM

## 2025-05-23 LAB
ATRIAL RATE: 91 BPM
Q ONSET: 223 MS
QRS COUNT: 17 BEATS
QRS DURATION: 78 MS
QT INTERVAL: 350 MS
QTC CALCULATION(BAZETT): 462 MS
QTC FREDERICIA: 421 MS
R AXIS: -9 DEGREES
T AXIS: 250 DEGREES
T OFFSET: 398 MS
VENTRICULAR RATE: 105 BPM

## 2025-05-23 PROCEDURE — G0299 HHS/HOSPICE OF RN EA 15 MIN: HCPCS

## 2025-05-23 ASSESSMENT — ACTIVITIES OF DAILY LIVING (ADL): ENTERING_EXITING_HOME: ONE PERSON

## 2025-05-23 ASSESSMENT — ENCOUNTER SYMPTOMS: DENIES PAIN: 1

## 2025-05-27 ENCOUNTER — TELEPHONE (OUTPATIENT)
Dept: CARDIOLOGY | Facility: HOSPITAL | Age: OVER 89
End: 2025-05-27
Payer: COMMERCIAL

## 2025-05-27 ASSESSMENT — ENCOUNTER SYMPTOMS
LAST BOWEL MOVEMENT: 67348
APPETITE LEVEL: GOOD

## 2025-05-27 ASSESSMENT — ACTIVITIES OF DAILY LIVING (ADL): OASIS_M1830: 01

## 2025-05-28 ENCOUNTER — HOME CARE VISIT (OUTPATIENT)
Dept: HOME HEALTH SERVICES | Facility: HOME HEALTH | Age: OVER 89
End: 2025-05-28
Payer: COMMERCIAL

## 2025-05-28 VITALS
TEMPERATURE: 97.3 F | OXYGEN SATURATION: 98 % | SYSTOLIC BLOOD PRESSURE: 130 MMHG | RESPIRATION RATE: 16 BRPM | HEART RATE: 62 BPM | DIASTOLIC BLOOD PRESSURE: 70 MMHG

## 2025-05-28 PROCEDURE — G0299 HHS/HOSPICE OF RN EA 15 MIN: HCPCS

## 2025-05-28 ASSESSMENT — ENCOUNTER SYMPTOMS
DENIES PAIN: 1
LAST BOWEL MOVEMENT: 67352
APPETITE LEVEL: GOOD

## 2025-05-29 ENCOUNTER — HOME CARE VISIT (OUTPATIENT)
Dept: HOME HEALTH SERVICES | Facility: HOME HEALTH | Age: OVER 89
End: 2025-05-29
Payer: COMMERCIAL

## 2025-05-29 VITALS
TEMPERATURE: 98 F | DIASTOLIC BLOOD PRESSURE: 72 MMHG | HEART RATE: 97 BPM | OXYGEN SATURATION: 98 % | SYSTOLIC BLOOD PRESSURE: 104 MMHG

## 2025-05-29 PROCEDURE — G0152 HHCP-SERV OF OT,EA 15 MIN: HCPCS

## 2025-05-29 ASSESSMENT — ENCOUNTER SYMPTOMS
OCCASIONAL FEELINGS OF UNSTEADINESS: 0
SHORTNESS OF BREATH: 1
PERSON REPORTING PAIN: PATIENT
DENIES PAIN: 1

## 2025-05-29 ASSESSMENT — ACTIVITIES OF DAILY LIVING (ADL)
BATHING ASSESSED: 1
AMBULATION ASSISTANCE: ONE PERSON
DRESSING_UB_CURRENT_FUNCTION: INDEPENDENT
DRESSING_LB_CURRENT_FUNCTION: INDEPENDENT
AMBULATION ASSISTANCE: SUPERVISION
AMBULATION ASSISTANCE: 1
TOILETING: 1
TOILETING: INDEPENDENT
BATHING_CURRENT_FUNCTION: INDEPENDENT
AMBULATION ASSISTANCE: STAND BY ASSIST

## 2025-05-29 NOTE — PROGRESS NOTES
Primary Care Physician: Natty Gabriel MD  Patient's Location: [place] [phone]    Date of Visit: 05/30/2025 11:00 AM EDT  Location of visit: [facility]   Type of Visit: Heart Failure New   Last visit: Visit date not found    DR. Carl Gillombardo: Cardiology    HPI / Summary:   Елена Perez is a very pleasant 89 y.o. female from [place], [place].  Елена Perez lives with her son.  She is accompanied by: (who provides additional history) son.    Елена Perez presenting post hospitalization for new acute diastolic heart failure follow up and optimization of GDMT medications. On 5/14/2025 she presented to the ED with worsening shortness of breath x 2 days. Ran out of her metoprolol 1 week ago but compliant with the rest of her medications. Increased -170s SVT on EKG, B/P 156/111 with JVD and bilateral rales. Needed BiPAP and IV lasix. Chest xray: moderate right and small to moderate left pleural effusions with diffuse interstitial lung markings suggestive of pulmonary edema.  and normal Troponin. No ischemic work-up pursued, given negative signs of ACS; HS trop negative x2, may consider on an OP basis for completion given new/unclear etiology of HFpEF (likely tachy-mediated). Suspect Afib RVR 2/2 med non-compliance; reports running out of home metoprolol for 1 week, but is a poor historian & has NOT been taking her aspirin d/t pill burden. PT/OT with MOCA score 13/30 indicated moderate cognitive impairment, however, patient limited by hearing difficulties and short attention span. Son is involved in mother's care. Prior to discharge transitioned to oral lasix 20mg daily, started Farxiga 10 mg, and spironolactone 25 mg daily for GDMT.  Discharge weight: 75.7kg     She was also admitted to [facility] on 4/20/2025 for CHF exacerbation with LVEF 60-65% with normal RVSP, no diastolic dysfunction and only mild left atrial dilation.     At our last apt on 5/20/2025, she endorsed  lightheadedness after taking her morning medication, extreme fatigue throughout the day especially after taking her medication.  She also had not taken her medication prior to our appointment. Her physical exam was unremarkable for exception of being tachycardic with heart rate in the 130s and irregular.  I sent her to the ED secondary to her EKG that I did in office that revealed A-fib RVR 130s to 140s and a manual blood pressure of 93/62.  In the ED on 5/20/2025, she continued to be A-fib RVR, and chest x-ray: unremarkable. They gave patient her home dose metoprolol 50 mg which lowered her heart rate to 80s and discharged patient.    Today:     She denies CP, palpitations, increase heart rate or skipped beat. Denies LUZ,  or difficulty ambulating long distances.  Denies SOB while changing, showering or bendopnea. Denies orthopnea/PND, cough, and sleeps on her side in bed with 1 pillow. Denies lower leg edema, abdominal tightness, feeling bloated or loss of appetite. Denies lightheadedness (was a concern at our last visit).  Denies dizziness, syncope, or recent falls. Medication adherent but admits to not taking her ASA but she will start today. Eating/drinking well. Denies NVD. Denies any nose bleed or hematochezia, on ASA and Eliquis. Home B/P 110-118's over 70. Home scale and weight stable at 165 lbs.    Hospitalizations:   5/14/2025-5/16/2025: acute on chronic heart failure exacerbation  4/20-4/21/2025: Observation- SOB sats 86%- DX w/ New onset of HF  01/29/2025-01/30/2025: New Afib (started on metoprolol and Eliquis)    Objective   Medical History (non cardiac):   # Anxiety  # hypothyroidism  # PAD w/ stable L SFA occlusion (incidental finding, ASA and lipitor was started)  # Hypertension: Last BP: 109/74.  # Hyperlipidemia: Last Tchol 5/16/2025: 174 / LDL  119 / HDL 38.4 / TRIG 84  # Type II Diabetes Mellitus: Last A1c 4/20/2025: 6.7.   # CKD3: Last BUN/Cr (GFR): 5/20/2025: 29/1.55 (32)  #    Cardiovascular  "History   # New DX Atrial Fibrillation on 1/24/2025 w/ elevated [id number]  # Non-ischemic Cardiomyopathy: suspect tachyarrhythmia mediated  # HFrEF /chronic diastolic Heart Failure, last EF  4/21/2025: 63%, dx'd 4/2025, ACC/AHA Stage C, NYHA II,     Surgical Hx:   #     Social Hx:  # She still smokes 2 packs of cigarette per week  # Denies ETOH or illicit drug use    Family Hx:   # Sister: liver CA  # Sister: Pancreatic Cancer  # Mother: Parkinsonism    Allergies:   RX Allergies[1]    Exam:       5/30/2025    11:16 AM 5/29/2025    12:32 PM 5/28/2025    11:08 AM 5/23/2025    12:58 PM 5/20/2025     4:45 PM 5/20/2025     4:34 PM   Vitals   Systolic 109 104 130 115  112   Diastolic 74 72 70 70  96   BP Location Left arm Left arm Left arm Left arm     Heart Rate 86 97 62 90 89 87   Temp  36.7 °C (98 °F) 36.3 °C (97.3 °F) 36.8 °C (98.2 °F)     Resp   16 16  13   Height 1.676 m (5' 6\")   1.676 m (5' 6\")     Weight (lb) 166.8   164     BMI 26.92 kg/m2   26.47 kg/m2     BSA (m2) 1.88 m2   1.86 m2     Visit Report Report    Report Report     Wt Readings from Last 5 Encounters:   05/30/25 75.7 kg (166 lb 12.8 oz)   05/23/25 74.4 kg (164 lb)   05/16/25 75.7 kg (166 lb 14.4 oz)   04/25/25 75.2 kg (165 lb 12.8 oz)   04/20/25 78.1 kg (172 lb 3.2 oz)     GEN: Pleasant, well-appearing, no acute distress,   HEENT: JVP not elevated, no icterus. Moist mucus membranes, No HJR, no thyromegaly  CHEST: No wheeze, good air movement bilaterally.  CV: normal, irregular rhythm. Normal S1, S2, NO S3/4, no m/r/g  ABD: Soft, ND, NT and normal active bowel sounds  EXT: Warm, well perfused, No LE edema, distal pulses are 2+ in upper extremities  SKIN: Dry with no rash present  NEURO: Oriented and alert x3, mood and affect appropriate and is oriented to plan    Labs:   CMP:  Recent Labs     05/20/25  1314 05/15/25  1923 05/15/25  0735 05/14/25  1619 04/21/25  0846    136 137 136 134*   K 4.9 3.8 4.1 3.9 3.4*   CL 97* 100 104 102 98   CO2 31 " "26 26 22 24   ANIONGAP 13 14 11 16 15   BUN 29* 22 18 16 15   CREATININE 1.55* 1.38* 1.05 1.09* 1.11*   EGFR 32* 37* 51* 49* 48*   MG 2.82* 2.28 2.21  --  1.94     Recent Labs     05/20/25  1314 05/15/25  1923 05/15/25  0735 05/14/25  1619   ALBUMIN 4.3 4.1 3.7 4.3   ALKPHOS 85  --  69 84   ALT 11  --  8 10   AST 20  --  11 13   BILITOT 0.6  --  0.6 0.7   CBC:  Recent Labs     05/20/25  1314 05/15/25  1923 05/15/25  0735 05/14/25  1619 04/21/25  0846   WBC 7.9 9.7 8.3 13.4* 9.4   HGB 14.1 11.7* 11.6* 13.2 13.4   HCT 41.2 36.8 37.8 38.6 42.0    281 256 269 255   MCV 79* 87 88 82 84   HEME/ENDO:  Recent Labs     05/15/25  0735 04/20/25  0403 01/30/25  0008 07/24/24  0922   TSH 6.65*  --  3.66 4.10*   FREET4 1.35  --   --  1.42   HGBA1C  --  6.7*  --  6.7*    CARDIAC:   Recent Labs     05/20/25  1314 05/14/25  1619 04/20/25  0548 04/20/25  0403 02/03/25  1014   TROPHS 11 6 7 6 8   * 509*  --  184*  --      Recent Labs     05/16/25  0816 07/24/24  0922 09/08/23  1203 12/29/22  1011 06/28/21  0941   CHOL 174 206* 203* 220* 193   LDLF  --   --  138* 153* 130*   LDLCALC 119* 144*  --   --   --    HDL 38.4 39.4 35.0* 40.4 40.2   TRIG 84 113 149 134 116   MICRO: No results for input(s): \"ESR\", \"CRP\", \"PROCAL\" in the last 44664 hours.No results found for the last 90 days.      Notable Studies, reviewed:   EKG:   Recent Labs     05/22/25  1615 05/20/25  1242 05/14/25  1645 01/29/25  1753 06/14/19  1212   VENTRATE 105 112 85   < > 79   ATRRATE 91  --  85  --  79   QTCFRED 421 352 386   < > 438   QRSDUR 78 76 68   < > 88   QTCCALCB 462 390 409   < > 458    < > = values in this interval not displayed.     Encounter Date: 05/20/25   ECG 12 lead   Result Value    Ventricular Rate 112    QRS Duration 76    QT Interval 286    QTC Calculation(Bazett) 390    R Axis 55    T Axis 241    QRS Count 19    Q Onset 225    T Offset 368    QTC Fredericia 352    Narrative    Atrial fibrillation with rapid ventricular " response  Septal infarct , age undetermined  Abnormal ECG  When compared with ECG of 14-MAY-2025 16:44,  Atrial fibrillation has replaced Sinus rhythm  Septal infarct is now Present  Nonspecific T wave abnormality, worse in Inferior leads  Nonspecific T wave abnormality, worse in Anterior leads     Echocardiogram:   Recent Labs     04/21/25  1032   EF 63   LVIDD 4.90   RV 29.8     Transthoracic Echo (TTE) Complete With Contrast And Ultromics 04/21/2025  CONCLUSIONS:  1. Poorly visualized anatomical structures due to suboptimal image quality.  2. Left ventricular ejection fraction is normal, by visual estimate at 60-65%.  3. There is normal right ventricular global systolic function.  4. The left atrium is mildly dilated.  5. Right ventricular systolic pressure is within normal limits.    Coronary Angiography: No results found for this or any previous visit from the past 1800 days.    Right Heart Cath: No results found for this or any previous visit from the past 1800 days.    Cardiac Scoring: No results found for this or any previous visit from the past 1800 days.    Cardiac MRI: No results found for this or any previous visit from the past 1800 days.    Nuclear:No results found for this or any previous visit from the past 1800 days.    Metabolic Stress: No results found for this or any previous visit from the past 1800 days.      Current Outpatient Medications   Medication Instructions    acetaminophen (TYLENOL) 325-650 mg, Every 4 hours PRN    aspirin 81 mg, oral, Daily    atorvastatin (LIPITOR) 40 mg, oral, Nightly    Blood glucose monitoring meter Use to check blood sugar daily.    blood sugar diagnostic (Blood Glucose Test) Use to check blood sugar daily.    cholecalciferol (Vitamin D-3) 5,000 Units tablet 1 tablet, Daily    Eliquis 5 mg, oral, 2 times daily    Farxiga 10 mg, oral, Daily    fluticasone (Flonase) 50 mcg/actuation nasal spray 1 spray, Daily    furosemide (LASIX) 20 mg, oral, As needed     hydrOXYzine HCL (ATARAX) 25 mg, oral, Every 6 hours PRN    lancets misc Use to check blood sugar daily    levothyroxine (SYNTHROID, LEVOXYL) 62.5 mcg, oral, Daily, Take on an empty stomach at the same time each day, either 30 to 60 minutes prior to breakfast    metoprolol succinate XL (TOPROL-XL) 50 mg, oral, Daily, Do not crush or chew.    sennosides-docusate sodium (Lindsey-Colace) 8.6-50 mg tablet 1 tablet, Daily PRN    spironolactone (ALDACTONE) 25 mg, oral, Daily      Notable Therapies: *GDMT*   Class  Agent SAFETY    *ARNI* / ACE / ARB   Last BP: 109/74, Last BUN/Cr (GFR): 5/20/2025: 29/1.55 (32)    *Beta-Blocker*  Metoprolol succinate 50 mg Daily Last HR: 86    *MRA*  Spironolactone 25 mg daily Last K: 5/20/2025: 4.9     *SGLT2*  Farxiga 10 mg daily Last A1c 4/20/2025: 6.7     Diuretic  Furosemide 20 mg daily--changed it to PRN Last BNP: 5/20/2025: 155    Hydralazine/ISDN       Digoxin  Last Digoxin level: No results found for requested labs within last 3650 days.    Anticoagulation  Eliquis 5 mg BID Last Hgb: 5/20/2025: 14.1    Anti-arrhythmic   Last QTc: 5/22/2025: 462    Antiplatelet  ASA 81 mg daily Last Platelet: 5/20/2025: 248    Lipid-Lowering  Lipitor 40 mg nightly Last Tchol 5/16/2025: 174 / LDL 9/8/2023: 138 or 5/16/2025: 119 / HDL 5/16/2025: 38.4 / TRIG 5/16/2025: 84    Other        Device(s)   EF: 4/21/2025: 63%, QRS: 5/22/2025: 78ms    Cardiac Rehab,   if EF <35/MI/OHS        HFpEF score table([id number])    IMPRESSION/PLAN:    Елена Perez is a 89 y.o. female with PMH of HTN, HLD, Afib, Type 2 DM, PAD with stable L SFA occlusion, hypothyroidism, and newly diagnosed HFpEF (4/2025) who presented to the ED with shortness of breath. She was admitted for ADHF 2/2 afib RVR under HHVI services. At the current times she has functional class  II symptoms and is euvolemic and warm on exam. She feels great, and energetic.     1) HFpEF /Chronic diastolic Heart Failure, NICM, last LVEF 60-65% (4/2025),  Stage C, NYHA II, -suspect tachy-mediated NICM (diagnosed w/afib in 1/2025), admit HS Trop negative x 2, no prior ischemic evaluation  -CXR with bilateral pleural effusion and evidence of pulmonary edema.   -TTE 4/202/25 -in afib: EF 60-65%, no WMAs, negative bubble study   -c/w farxiga 10 mg daily  -c/w spironolactone 25 mg daily  -c/w furosemide 20 mg daily--euvolemic, change it to PRN for increase weight gain of 2 to 3 lbs overnight, 5lbs over the week, swelling in lower extremities, abdominal tightness or shortness of breath--(should also see improved kidney function)  - ordered labs to have drawn prior to our next apt (cmp & BNP)    2) A-fib, elevated [id number] 1/2025  -currently afib rate-controlled, HR 90-low 100s   -c/w Metoprolol succinate 50 mg daily  -c/w Eliquis 5mg BID    3) Hypertension, well-controlled  See number 1    4) PAD/hyperlipidemia/elevated CAD risk  -c/w aspirin 81 mg daily  -c/w lipitor 40 mg nightly      MDM: 4  2 stable Chronic illnesses  Review of prior external notes from each unique source  Prescription drug management    Orders:  Orders Placed This Encounter   Procedures    Comprehensive Metabolic Panel    B-Type Natriuretic Peptide        Followup Appts:  Future Appointments   Date Time Provider Department Center   6/2/2025 To Be Determined Susan William OT [facility]   6/4/2025 To Be Determined Susan William OT [facility]   6/4/2025 To Be Determined Parul Virgen LPN [facility]   6/9/2025 To Be Determined Susan William OT [facility]   6/13/2025  2:20 PM ANTONETTE Catherine-CNP [id number] [place]   7/1/2025 11:00 AM JAYA Choudhary [id number] [facility]   7/21/2025  9:30 AM ANTONETTE South-CNP [id number] [facility]   8/26/2025  1:00 PM Rich Galloway MD [id number] [facility]           ____________________________________________________________    JAYA Choudhary  Outpatient Heart Failure clinic  Division of Cardiovascular Medicine  [facility]  and Vascular [facility]         [1]   Allergies  Allergen Reactions    Lisinopril Swelling     Patient son stated that after taking this medication patient feet became swollen.

## 2025-05-30 ENCOUNTER — OFFICE VISIT (OUTPATIENT)
Dept: CARDIOLOGY | Facility: HOSPITAL | Age: OVER 89
End: 2025-05-30
Payer: COMMERCIAL

## 2025-05-30 ENCOUNTER — HOME CARE VISIT (OUTPATIENT)
Dept: HOME HEALTH SERVICES | Facility: HOME HEALTH | Age: OVER 89
End: 2025-05-30
Payer: COMMERCIAL

## 2025-05-30 VITALS
OXYGEN SATURATION: 98 % | SYSTOLIC BLOOD PRESSURE: 109 MMHG | HEIGHT: 66 IN | HEART RATE: 86 BPM | DIASTOLIC BLOOD PRESSURE: 74 MMHG | BODY MASS INDEX: 26.81 KG/M2 | WEIGHT: 166.8 LBS

## 2025-05-30 DIAGNOSIS — I48.91 ATRIAL FIBRILLATION WITH RVR (MULTI): ICD-10-CM

## 2025-05-30 DIAGNOSIS — I50.9 HEART FAILURE, UNSPECIFIED HF CHRONICITY, UNSPECIFIED HEART FAILURE TYPE: ICD-10-CM

## 2025-05-30 PROCEDURE — 1159F MED LIST DOCD IN RCRD: CPT

## 2025-05-30 PROCEDURE — 1111F DSCHRG MED/CURRENT MED MERGE: CPT

## 2025-05-30 PROCEDURE — 99214 OFFICE O/P EST MOD 30 MIN: CPT

## 2025-05-30 PROCEDURE — 99212 OFFICE O/P EST SF 10 MIN: CPT

## 2025-05-30 PROCEDURE — 3074F SYST BP LT 130 MM HG: CPT

## 2025-05-30 PROCEDURE — 1126F AMNT PAIN NOTED NONE PRSNT: CPT

## 2025-05-30 PROCEDURE — 1160F RVW MEDS BY RX/DR IN RCRD: CPT

## 2025-05-30 PROCEDURE — 3078F DIAST BP <80 MM HG: CPT

## 2025-05-30 RX ORDER — FUROSEMIDE 20 MG/1
20 TABLET ORAL AS NEEDED
Qty: 30 TABLET | Refills: 1 | Status: SHIPPED | OUTPATIENT
Start: 2025-05-30 | End: 2025-07-29

## 2025-05-30 ASSESSMENT — ENCOUNTER SYMPTOMS
LOSS OF SENSATION IN FEET: 0
OCCASIONAL FEELINGS OF UNSTEADINESS: 1
DEPRESSION: 1

## 2025-05-30 ASSESSMENT — PATIENT HEALTH QUESTIONNAIRE - PHQ9
2. FEELING DOWN, DEPRESSED OR HOPELESS: SEVERAL DAYS
SUM OF ALL RESPONSES TO PHQ9 QUESTIONS 1 AND 2: 1
1. LITTLE INTEREST OR PLEASURE IN DOING THINGS: NOT AT ALL

## 2025-05-30 ASSESSMENT — COLUMBIA-SUICIDE SEVERITY RATING SCALE - C-SSRS
1. IN THE PAST MONTH, HAVE YOU WISHED YOU WERE DEAD OR WISHED YOU COULD GO TO SLEEP AND NOT WAKE UP?: NO
2. HAVE YOU ACTUALLY HAD ANY THOUGHTS OF KILLING YOURSELF?: NO
6. HAVE YOU EVER DONE ANYTHING, STARTED TO DO ANYTHING, OR PREPARED TO DO ANYTHING TO END YOUR LIFE?: NO

## 2025-05-30 ASSESSMENT — PAIN SCALES - GENERAL: PAINLEVEL_OUTOF10: 0-NO PAIN

## 2025-05-30 NOTE — PATIENT INSTRUCTIONS
"It was a pleasure seeing you today. Please contact myself or my team with any questions.     To reach Ofelia Black' office please call [phone]  Fax: [phone]  To schedule an office appointment call [phone].        [unfilled]  1) Please bring a list of your medications to every visit.  2) Continue current medications with the exception of:   -- please take your furosemide (lasix) 20 mg tablet \"water pill\" as needed only: for increase weight gain of 2 to 3 lbs overnight, or 5 lbs over the week, or for swelling in the lower extremities (sock markings, or difficulty putting on socks or shoes), increase shortness of breath.    3) Labwork: Please have labs drawn 2 days prior to our next apt. We will call you with any abnormal results only.    -4) Follow up: with me in 4 to 5 weeks                "

## 2025-06-02 ENCOUNTER — HOME CARE VISIT (OUTPATIENT)
Dept: HOME HEALTH SERVICES | Facility: HOME HEALTH | Age: OVER 89
End: 2025-06-02
Payer: COMMERCIAL

## 2025-06-02 VITALS
OXYGEN SATURATION: 99 % | SYSTOLIC BLOOD PRESSURE: 114 MMHG | DIASTOLIC BLOOD PRESSURE: 74 MMHG | HEART RATE: 60 BPM | TEMPERATURE: 97.6 F

## 2025-06-02 PROCEDURE — G0152 HHCP-SERV OF OT,EA 15 MIN: HCPCS

## 2025-06-02 ASSESSMENT — ENCOUNTER SYMPTOMS
PAIN LOCATION: RIGHT HIP
PAIN LOCATION - PAIN SEVERITY: 5/10
LOWEST PAIN SEVERITY IN PAST 24 HOURS: 0/10
PAIN LOCATION: LEFT HIP
HIGHEST PAIN SEVERITY IN PAST 24 HOURS: 5/10
PAIN: 1
PAIN LOCATION - PAIN SEVERITY: 5/10
PAIN LOCATION - PAIN SEVERITY: 0/10
PAIN LOCATION: BACK
PERSON REPORTING PAIN: PATIENT

## 2025-06-03 ENCOUNTER — HOME CARE VISIT (OUTPATIENT)
Dept: HOME HEALTH SERVICES | Facility: HOME HEALTH | Age: OVER 89
End: 2025-06-03
Payer: COMMERCIAL

## 2025-06-03 VITALS — DIASTOLIC BLOOD PRESSURE: 65 MMHG | OXYGEN SATURATION: 98 % | HEART RATE: 85 BPM | SYSTOLIC BLOOD PRESSURE: 95 MMHG

## 2025-06-03 PROCEDURE — G0151 HHCP-SERV OF PT,EA 15 MIN: HCPCS

## 2025-06-03 ASSESSMENT — ENCOUNTER SYMPTOMS
PAIN SEVERITY GOAL: 0/10
HIGHEST PAIN SEVERITY IN PAST 24 HOURS: 4/10
LOWEST PAIN SEVERITY IN PAST 24 HOURS: 0/10
PERSON REPORTING PAIN: PATIENT
OCCASIONAL FEELINGS OF UNSTEADINESS: 0
DEPRESSION: 0
PAIN LOCATION: RIGHT HIP
PAIN LOCATION - PAIN SEVERITY: 5/10
LOSS OF SENSATION IN FEET: 1
DENIES PAIN: 1
PAIN LOCATION: LEFT HIP
PAIN LOCATION - PAIN SEVERITY: 5/10

## 2025-06-03 NOTE — CASE COMMUNICATION
I evaluated Елена Perez for PT today, I had concerns about her irregular heart rate and compliance with meds.   Her HR was fluctuating between  after activity, she was SOB and fatigued.  SPO2 ranged from 94-98%.       I also have concerns about her compliance with her medications as she had forgotten to take 3 of her meds.   Would you be able to follow up with the patient and caregiver?      Thanks

## 2025-06-04 ENCOUNTER — HOME CARE VISIT (OUTPATIENT)
Dept: HOME HEALTH SERVICES | Facility: HOME HEALTH | Age: OVER 89
End: 2025-06-04
Payer: COMMERCIAL

## 2025-06-06 ENCOUNTER — HOME CARE VISIT (OUTPATIENT)
Dept: HOME HEALTH SERVICES | Facility: HOME HEALTH | Age: OVER 89
End: 2025-06-06
Payer: COMMERCIAL

## 2025-06-06 VITALS
TEMPERATURE: 97.1 F | SYSTOLIC BLOOD PRESSURE: 124 MMHG | OXYGEN SATURATION: 98 % | DIASTOLIC BLOOD PRESSURE: 74 MMHG | HEART RATE: 68 BPM

## 2025-06-06 PROCEDURE — G0152 HHCP-SERV OF OT,EA 15 MIN: HCPCS

## 2025-06-06 ASSESSMENT — ENCOUNTER SYMPTOMS
PAIN: 1
PAIN LOCATION: ABDOMEN
PERSON REPORTING PAIN: PATIENT
PAIN LOCATION - PAIN QUALITY: CRAMPING
LOWEST PAIN SEVERITY IN PAST 24 HOURS: 0/10
HIGHEST PAIN SEVERITY IN PAST 24 HOURS: 7/10
PAIN LOCATION - PAIN SEVERITY: 7/10

## 2025-06-08 PROCEDURE — RXMED WILLOW AMBULATORY MEDICATION CHARGE

## 2025-06-09 ENCOUNTER — APPOINTMENT (OUTPATIENT)
Dept: CARDIOLOGY | Facility: CLINIC | Age: OVER 89
End: 2025-06-09
Payer: COMMERCIAL

## 2025-06-09 ENCOUNTER — HOME CARE VISIT (OUTPATIENT)
Dept: HOME HEALTH SERVICES | Facility: HOME HEALTH | Age: OVER 89
End: 2025-06-09
Payer: COMMERCIAL

## 2025-06-11 ENCOUNTER — HOME CARE VISIT (OUTPATIENT)
Dept: HOME HEALTH SERVICES | Facility: HOME HEALTH | Age: OVER 89
End: 2025-06-11
Payer: COMMERCIAL

## 2025-06-11 VITALS
SYSTOLIC BLOOD PRESSURE: 120 MMHG | OXYGEN SATURATION: 98 % | TEMPERATURE: 98 F | HEART RATE: 73 BPM | DIASTOLIC BLOOD PRESSURE: 60 MMHG

## 2025-06-11 PROCEDURE — G0152 HHCP-SERV OF OT,EA 15 MIN: HCPCS

## 2025-06-11 ASSESSMENT — ENCOUNTER SYMPTOMS
PAIN LOCATION - PAIN SEVERITY: 7/10
PAIN LOCATION - PAIN FREQUENCY: INTERMITTENT
PAIN LOCATION - PAIN QUALITY: ARTHRITIC
PAIN: 1
PAIN LOCATION: BACK
LOWEST PAIN SEVERITY IN PAST 24 HOURS: 0/10
PERSON REPORTING PAIN: PATIENT
HIGHEST PAIN SEVERITY IN PAST 24 HOURS: 7/10

## 2025-06-12 ENCOUNTER — HOME CARE VISIT (OUTPATIENT)
Dept: HOME HEALTH SERVICES | Facility: HOME HEALTH | Age: OVER 89
End: 2025-06-12
Payer: COMMERCIAL

## 2025-06-12 VITALS
RESPIRATION RATE: 16 BRPM | DIASTOLIC BLOOD PRESSURE: 63 MMHG | TEMPERATURE: 98 F | OXYGEN SATURATION: 96 % | SYSTOLIC BLOOD PRESSURE: 106 MMHG | HEART RATE: 73 BPM

## 2025-06-12 PROCEDURE — G0300 HHS/HOSPICE OF LPN EA 15 MIN: HCPCS

## 2025-06-12 ASSESSMENT — ENCOUNTER SYMPTOMS
PAIN LOCATION: ABDOMEN
BOWEL PATTERN NORMAL: 1
MUSCLE WEAKNESS: 1
PAIN: 1
APPETITE LEVEL: GOOD
PERSON REPORTING PAIN: PATIENT
SUBJECTIVE PAIN PROGRESSION: UNCHANGED
LAST BOWEL MOVEMENT: 67367
HIGHEST PAIN SEVERITY IN PAST 24 HOURS: 3/10
OCCASIONAL FEELINGS OF UNSTEADINESS: 0

## 2025-06-13 ENCOUNTER — APPOINTMENT (OUTPATIENT)
Dept: PRIMARY CARE | Facility: CLINIC | Age: OVER 89
End: 2025-06-13
Payer: COMMERCIAL

## 2025-06-13 VITALS
WEIGHT: 168.2 LBS | HEART RATE: 81 BPM | BODY MASS INDEX: 27.15 KG/M2 | SYSTOLIC BLOOD PRESSURE: 119 MMHG | DIASTOLIC BLOOD PRESSURE: 82 MMHG | TEMPERATURE: 97.9 F | OXYGEN SATURATION: 100 %

## 2025-06-13 DIAGNOSIS — I10 BENIGN ESSENTIAL HYPERTENSION: Primary | ICD-10-CM

## 2025-06-13 DIAGNOSIS — I50.9 CONGESTIVE HEART FAILURE, UNSPECIFIED HF CHRONICITY, UNSPECIFIED HEART FAILURE TYPE: ICD-10-CM

## 2025-06-13 DIAGNOSIS — E55.9 VITAMIN D DEFICIENCY: ICD-10-CM

## 2025-06-13 DIAGNOSIS — E03.9 HYPOTHYROIDISM, UNSPECIFIED TYPE: ICD-10-CM

## 2025-06-13 DIAGNOSIS — E11.9 DIABETES MELLITUS WITHOUT COMPLICATION: ICD-10-CM

## 2025-06-13 DIAGNOSIS — K59.00 CONSTIPATION, UNSPECIFIED CONSTIPATION TYPE: ICD-10-CM

## 2025-06-13 DIAGNOSIS — E78.00 HYPERCHOLESTEROLEMIA: ICD-10-CM

## 2025-06-13 PROCEDURE — 1159F MED LIST DOCD IN RCRD: CPT

## 2025-06-13 PROCEDURE — 99397 PER PM REEVAL EST PAT 65+ YR: CPT

## 2025-06-13 PROCEDURE — 3079F DIAST BP 80-89 MM HG: CPT

## 2025-06-13 PROCEDURE — 99214 OFFICE O/P EST MOD 30 MIN: CPT

## 2025-06-13 PROCEDURE — 1160F RVW MEDS BY RX/DR IN RCRD: CPT

## 2025-06-13 PROCEDURE — 1111F DSCHRG MED/CURRENT MED MERGE: CPT

## 2025-06-13 PROCEDURE — G0439 PPPS, SUBSEQ VISIT: HCPCS

## 2025-06-13 PROCEDURE — 3074F SYST BP LT 130 MM HG: CPT

## 2025-06-13 RX ORDER — DOCUSATE SODIUM 100 MG/1
100 CAPSULE, LIQUID FILLED ORAL 2 TIMES DAILY
Qty: 60 CAPSULE | Refills: 11 | Status: SHIPPED | OUTPATIENT
Start: 2025-06-13

## 2025-06-13 RX ORDER — IBUPROFEN 200 MG
CAPSULE ORAL
Qty: 30 STRIP | Refills: 11 | Status: SHIPPED | OUTPATIENT
Start: 2025-06-13

## 2025-06-13 RX ORDER — LANCETS
EACH MISCELLANEOUS
Qty: 100 EACH | Refills: 3 | Status: SHIPPED | OUTPATIENT
Start: 2025-06-13

## 2025-06-13 RX ORDER — INSULIN PUMP SYRINGE, 3 ML
EACH MISCELLANEOUS
Qty: 1 EACH | Refills: 0 | Status: SHIPPED | OUTPATIENT
Start: 2025-06-13 | End: 2026-06-13

## 2025-06-13 ASSESSMENT — COLUMBIA-SUICIDE SEVERITY RATING SCALE - C-SSRS
1. IN THE PAST MONTH, HAVE YOU WISHED YOU WERE DEAD OR WISHED YOU COULD GO TO SLEEP AND NOT WAKE UP?: NO
6. HAVE YOU EVER DONE ANYTHING, STARTED TO DO ANYTHING, OR PREPARED TO DO ANYTHING TO END YOUR LIFE?: NO
2. HAVE YOU ACTUALLY HAD ANY THOUGHTS OF KILLING YOURSELF?: NO

## 2025-06-13 ASSESSMENT — PATIENT HEALTH QUESTIONNAIRE - PHQ9
1. LITTLE INTEREST OR PLEASURE IN DOING THINGS: NOT AT ALL
2. FEELING DOWN, DEPRESSED OR HOPELESS: NOT AT ALL
SUM OF ALL RESPONSES TO PHQ9 QUESTIONS 1 AND 2: 0

## 2025-06-13 NOTE — PROGRESS NOTES
Subjective   Patient ID: Елена Perez is a 89 y.o. female who presents for Establish Care.    HPI  This patient is new to me and is here with her son for a Medicare wellness and to establish care.  She was recently hospitalized for diastolic heart failure and afib.  She is doing well.  Her son is requesting a prescription for a bath tub bench to assist with getting in and out of the bathtub.  Also requesting a prescription for a glucometer and supplies so that they can begin checking her blood sugars at home.  Patient does complain of some constipation.  Does not feel like her current stool softener is helping.      Review of Systems  All pertinent positive symptoms are included in the history of present illness.  All other systems have been reviewed and are negative and noncontributory to this patient's current ailments.    Current Outpatient Medications   Medication Instructions    acetaminophen (TYLENOL) 325-650 mg, Every 4 hours PRN    atorvastatin (LIPITOR) 40 mg, oral, Nightly    Blood glucose monitoring meter Use to check blood sugar daily.    blood sugar diagnostic (Blood Glucose Test) Use to check blood sugar daily.    cholecalciferol (Vitamin D-3) 5,000 Units tablet 1 tablet, Daily    docusate sodium (COLACE) 100 mg, oral, 2 times daily    Eliquis 5 mg, oral, 2 times daily    Farxiga 10 mg, oral, Daily    fluticasone (Flonase) 50 mcg/actuation nasal spray 1 spray, Daily    furosemide (LASIX) 20 mg, oral, As needed    hydrOXYzine HCL (ATARAX) 25 mg, oral, Every 6 hours PRN    lancets misc Use to check blood sugar daily    levothyroxine (SYNTHROID, LEVOXYL) 62.5 mcg, oral, Daily, Take on an empty stomach at the same time each day, either 30 to 60 minutes prior to breakfast    metoprolol succinate XL (TOPROL-XL) 50 mg, oral, Daily, Do not crush or chew.     Objective   Visit Vitals  /82   Pulse 81   Temp 36.6 °C (97.9 °F)   Wt 76.3 kg (168 lb 3.2 oz)   SpO2 100%   BMI 27.15 kg/m²   Smoking Status Some  Days   BSA 1.88 m²       Physical Exam  CONSTITUTIONAL - well nourished, well developed, looks like stated age, in no acute distress.  SKIN - normal skin color and pigmentation, normal skin turgor without rash, lesions, or nodules visualized  HEAD - no trauma, normocephalic  EYES - pupils are equal and reactive to light, and extraocular muscles are intact  ENT - TM's intact, no signs of infection, uvula midline, and throat normal, no exudate, nasal passage without discharge.  NECK - supple without rigidity, no neck mass was observed, no thyromegaly or thyroid nodules  CHEST - clear to auscultation, no wheezing, no crackles and no rales, good effort  CARDIAC - regular rate and regular rhythm, no skipped beats, no murmur  ABDOMEN - no organomegaly, soft, nontender, nondistended, normal bowel sounds, no guarding/rebound/rigidity.  EXTREMITIES - no obvious or evident edema, no obvious or evident deformities  NEUROLOGICAL - normal gait, normal balance, no ataxia, alert, oriented  PSYCHIATRIC - alert, pleasant and age-appropriate  IMMUNOLOGIC - no cervical lymphadenopathy     Assessment/Plan   Assessment & Plan  Benign essential hypertension  - Follows with cardiology  -Blood pressure at goal in office  -Continue current regimen  Hypercholesterolemia  - Continue atorvastatin 40 mg nightly  Hypothyroidism, unspecified type  - Continue levothyroxine 62.5 mcg daily  Diabetes mellitus without complication  - Begin checking blood sugars 1-2 times weekly at home  Constipation, unspecified constipation type  - Colace 100 mg twice daily  - Encouraged to increase fiber in her diet    Orders Placed This Encounter   Procedures    Tub Bench         1. HM  -CBC, CMP, Lipid panel, Vit D, TSH with reflex T4  -Vaccines:       Flu: Defer      Shingrix:  recommended at pharm      Pneumococcal: 02/02/2016      Tdap: recommended at pharm  -Venancio w/ arlene: defer  -Last PAP: defer  -DEXA: defer  -Colonoscopy: defer      Return to office in 6  months or sooner if needed.  If you should experience concerning symptoms, go to the nearest Emergency Department.      Alexandra Ambrosio, APRN-CNP

## 2025-06-16 ENCOUNTER — CLINICAL SUPPORT (OUTPATIENT)
Dept: EMERGENCY MEDICINE | Facility: HOSPITAL | Age: 89
DRG: 293 | End: 2025-06-16
Payer: COMMERCIAL

## 2025-06-16 ENCOUNTER — APPOINTMENT (OUTPATIENT)
Dept: RADIOLOGY | Facility: HOSPITAL | Age: 89
DRG: 293 | End: 2025-06-16
Payer: COMMERCIAL

## 2025-06-16 ENCOUNTER — HOSPITAL ENCOUNTER (INPATIENT)
Facility: HOSPITAL | Age: 89
LOS: 1 days | Discharge: HOME HEALTH | DRG: 293 | End: 2025-06-17
Attending: EMERGENCY MEDICINE | Admitting: STUDENT IN AN ORGANIZED HEALTH CARE EDUCATION/TRAINING PROGRAM
Payer: COMMERCIAL

## 2025-06-16 ENCOUNTER — PHARMACY VISIT (OUTPATIENT)
Dept: PHARMACY | Facility: CLINIC | Age: OVER 89
End: 2025-06-16
Payer: COMMERCIAL

## 2025-06-16 DIAGNOSIS — I48.91 ATRIAL FIBRILLATION WITH RAPID VENTRICULAR RESPONSE (MULTI): ICD-10-CM

## 2025-06-16 DIAGNOSIS — M79.606 PAIN OF LOWER EXTREMITY, UNSPECIFIED LATERALITY: ICD-10-CM

## 2025-06-16 DIAGNOSIS — R09.02 HYPOXIA: Primary | ICD-10-CM

## 2025-06-16 LAB
ALBUMIN SERPL BCP-MCNC: 4.4 G/DL (ref 3.4–5)
ALP SERPL-CCNC: 84 U/L (ref 33–136)
ALT SERPL W P-5'-P-CCNC: 10 U/L (ref 7–45)
ANION GAP BLDV CALCULATED.4IONS-SCNC: 11 MMOL/L (ref 10–25)
ANION GAP BLDV CALCULATED.4IONS-SCNC: 12 MMOL/L (ref 10–25)
ANION GAP BLDV CALCULATED.4IONS-SCNC: 9 MMOL/L (ref 10–25)
ANION GAP SERPL CALC-SCNC: 16 MMOL/L (ref 10–20)
APPEARANCE UR: CLEAR
APTT PPP: 30 SECONDS (ref 26–36)
AST SERPL W P-5'-P-CCNC: 17 U/L (ref 9–39)
ATRIAL RATE: 326 BPM
BASE EXCESS BLDV CALC-SCNC: -0.2 MMOL/L (ref -2–3)
BASE EXCESS BLDV CALC-SCNC: -2.3 MMOL/L (ref -2–3)
BASE EXCESS BLDV CALC-SCNC: -3.4 MMOL/L (ref -2–3)
BASOPHILS # BLD AUTO: 0.03 X10*3/UL (ref 0–0.1)
BASOPHILS NFR BLD AUTO: 0.3 %
BILIRUB SERPL-MCNC: 0.7 MG/DL (ref 0–1.2)
BILIRUB UR STRIP.AUTO-MCNC: NEGATIVE MG/DL
BNP SERPL-MCNC: 348 PG/ML (ref 0–99)
BODY TEMPERATURE: 37 DEGREES CELSIUS
BUN SERPL-MCNC: 18 MG/DL (ref 6–23)
CA-I BLDV-SCNC: 1.17 MMOL/L (ref 1.1–1.33)
CA-I BLDV-SCNC: 1.24 MMOL/L (ref 1.1–1.33)
CA-I BLDV-SCNC: 1.25 MMOL/L (ref 1.1–1.33)
CALCIUM SERPL-MCNC: 9.9 MG/DL (ref 8.6–10.6)
CARDIAC TROPONIN I PNL SERPL HS: 13 NG/L (ref 0–34)
CARDIAC TROPONIN I PNL SERPL HS: 9 NG/L (ref 0–34)
CHLORIDE BLDV-SCNC: 107 MMOL/L (ref 98–107)
CHLORIDE BLDV-SCNC: 108 MMOL/L (ref 98–107)
CHLORIDE BLDV-SCNC: 110 MMOL/L (ref 98–107)
CHLORIDE SERPL-SCNC: 107 MMOL/L (ref 98–107)
CO2 SERPL-SCNC: 22 MMOL/L (ref 21–32)
COLOR UR: COLORLESS
CREAT SERPL-MCNC: 1.25 MG/DL (ref 0.5–1.05)
EGFRCR SERPLBLD CKD-EPI 2021: 41 ML/MIN/1.73M*2
EOSINOPHIL # BLD AUTO: 0.06 X10*3/UL (ref 0–0.4)
EOSINOPHIL NFR BLD AUTO: 0.5 %
ERYTHROCYTE [DISTWIDTH] IN BLOOD BY AUTOMATED COUNT: 17 % (ref 11.5–14.5)
GLUCOSE BLDV-MCNC: 127 MG/DL (ref 74–99)
GLUCOSE BLDV-MCNC: 151 MG/DL (ref 74–99)
GLUCOSE BLDV-MCNC: 177 MG/DL (ref 74–99)
GLUCOSE SERPL-MCNC: 134 MG/DL (ref 74–99)
GLUCOSE UR STRIP.AUTO-MCNC: ABNORMAL MG/DL
HCO3 BLDV-SCNC: 22.5 MMOL/L (ref 22–26)
HCO3 BLDV-SCNC: 23.6 MMOL/L (ref 22–26)
HCO3 BLDV-SCNC: 25.9 MMOL/L (ref 22–26)
HCT VFR BLD AUTO: 42.2 % (ref 36–46)
HCT VFR BLD EST: 40 % (ref 36–46)
HCT VFR BLD EST: 41 % (ref 36–46)
HCT VFR BLD EST: 41 % (ref 36–46)
HGB BLD-MCNC: 13.4 G/DL (ref 12–16)
HGB BLDV-MCNC: 13.2 G/DL (ref 12–16)
HGB BLDV-MCNC: 13.6 G/DL (ref 12–16)
HGB BLDV-MCNC: 13.7 G/DL (ref 12–16)
HOLD SPECIMEN: 293
IMM GRANULOCYTES # BLD AUTO: 0.08 X10*3/UL (ref 0–0.5)
IMM GRANULOCYTES NFR BLD AUTO: 0.7 % (ref 0–0.9)
INHALED O2 CONCENTRATION: 30 %
INR PPP: 1.2 (ref 0.9–1.1)
KETONES UR STRIP.AUTO-MCNC: NEGATIVE MG/DL
LACTATE BLDV-SCNC: 1.5 MMOL/L (ref 0.4–2)
LACTATE BLDV-SCNC: 1.9 MMOL/L (ref 0.4–2)
LACTATE BLDV-SCNC: 2 MMOL/L (ref 0.4–2)
LEUKOCYTE ESTERASE UR QL STRIP.AUTO: ABNORMAL
LYMPHOCYTES # BLD AUTO: 5.51 X10*3/UL (ref 0.8–3)
LYMPHOCYTES NFR BLD AUTO: 48.8 %
MAGNESIUM SERPL-MCNC: 2.19 MG/DL (ref 1.6–2.4)
MCH RBC QN AUTO: 27.4 PG (ref 26–34)
MCHC RBC AUTO-ENTMCNC: 31.8 G/DL (ref 32–36)
MCV RBC AUTO: 86 FL (ref 80–100)
MONOCYTES # BLD AUTO: 1 X10*3/UL (ref 0.05–0.8)
MONOCYTES NFR BLD AUTO: 8.9 %
NEUTROPHILS # BLD AUTO: 4.6 X10*3/UL (ref 1.6–5.5)
NEUTROPHILS NFR BLD AUTO: 40.8 %
NITRITE UR QL STRIP.AUTO: NEGATIVE
NRBC BLD-RTO: 0 /100 WBCS (ref 0–0)
OXYHGB MFR BLDV: 57.2 % (ref 45–75)
OXYHGB MFR BLDV: 78.4 % (ref 45–75)
OXYHGB MFR BLDV: 86.9 % (ref 45–75)
PCO2 BLDV: 38 MM HG (ref 41–51)
PCO2 BLDV: 47 MM HG (ref 41–51)
PCO2 BLDV: 49 MM HG (ref 41–51)
PH BLDV: 7.29 PH (ref 7.33–7.43)
PH BLDV: 7.35 PH (ref 7.33–7.43)
PH BLDV: 7.38 PH (ref 7.33–7.43)
PH UR STRIP.AUTO: 5.5 [PH]
PLATELET # BLD AUTO: 227 X10*3/UL (ref 150–450)
PO2 BLDV: 39 MM HG (ref 35–45)
PO2 BLDV: 52 MM HG (ref 35–45)
PO2 BLDV: 60 MM HG (ref 35–45)
POTASSIUM BLDV-SCNC: 4 MMOL/L (ref 3.5–5.3)
POTASSIUM BLDV-SCNC: 5.9 MMOL/L (ref 3.5–5.3)
POTASSIUM BLDV-SCNC: 7.5 MMOL/L (ref 3.5–5.3)
POTASSIUM SERPL-SCNC: 4.7 MMOL/L (ref 3.5–5.3)
PROT SERPL-MCNC: 8.6 G/DL (ref 6.4–8.2)
PROT UR STRIP.AUTO-MCNC: NEGATIVE MG/DL
PROTHROMBIN TIME: 13.6 SECONDS (ref 9.8–12.4)
Q ONSET: 224 MS
Q ONSET: 225 MS
Q ONSET: 225 MS
QRS COUNT: 17 BEATS
QRS COUNT: 23 BEATS
QRS COUNT: 26 BEATS
QRS DURATION: 68 MS
QRS DURATION: 72 MS
QRS DURATION: 72 MS
QT INTERVAL: 276 MS
QT INTERVAL: 286 MS
QT INTERVAL: 368 MS
QTC CALCULATION(BAZETT): 424 MS
QTC CALCULATION(BAZETT): 458 MS
QTC CALCULATION(BAZETT): 491 MS
QTC FREDERICIA: 368 MS
QTC FREDERICIA: 391 MS
QTC FREDERICIA: 446 MS
R AXIS: 28 DEGREES
R AXIS: 34 DEGREES
R AXIS: 56 DEGREES
RBC # BLD AUTO: 4.89 X10*6/UL (ref 4–5.2)
RBC # UR STRIP.AUTO: NEGATIVE MG/DL
RBC #/AREA URNS AUTO: NORMAL /HPF
SAO2 % BLDV: 58 % (ref 45–75)
SAO2 % BLDV: 81 % (ref 45–75)
SAO2 % BLDV: 90 % (ref 45–75)
SODIUM BLDV-SCNC: 135 MMOL/L (ref 136–145)
SODIUM BLDV-SCNC: 137 MMOL/L (ref 136–145)
SODIUM BLDV-SCNC: 140 MMOL/L (ref 136–145)
SODIUM SERPL-SCNC: 140 MMOL/L (ref 136–145)
SP GR UR STRIP.AUTO: 1
SQUAMOUS #/AREA URNS AUTO: NORMAL /HPF
T AXIS: -16 DEGREES
T AXIS: 229 DEGREES
T AXIS: 99 DEGREES
T OFFSET: 363 MS
T OFFSET: 368 MS
T OFFSET: 408 MS
T4 FREE SERPL-MCNC: 1.56 NG/DL (ref 0.78–1.48)
TSH SERPL-ACNC: 9.95 MIU/L (ref 0.44–3.98)
UROBILINOGEN UR STRIP.AUTO-MCNC: NORMAL MG/DL
VENTRICULAR RATE: 107 BPM
VENTRICULAR RATE: 142 BPM
VENTRICULAR RATE: 154 BPM
WBC # BLD AUTO: 11.3 X10*3/UL (ref 4.4–11.3)
WBC #/AREA URNS AUTO: NORMAL /HPF

## 2025-06-16 PROCEDURE — 2500000004 HC RX 250 GENERAL PHARMACY W/ HCPCS (ALT 636 FOR OP/ED)

## 2025-06-16 PROCEDURE — 36415 COLL VENOUS BLD VENIPUNCTURE: CPT | Performed by: EMERGENCY MEDICINE

## 2025-06-16 PROCEDURE — 94660 CPAP INITIATION&MGMT: CPT

## 2025-06-16 PROCEDURE — 93005 ELECTROCARDIOGRAM TRACING: CPT

## 2025-06-16 PROCEDURE — 99223 1ST HOSP IP/OBS HIGH 75: CPT

## 2025-06-16 PROCEDURE — 85025 COMPLETE CBC W/AUTO DIFF WBC: CPT

## 2025-06-16 PROCEDURE — 2500000001 HC RX 250 WO HCPCS SELF ADMINISTERED DRUGS (ALT 637 FOR MEDICARE OP)

## 2025-06-16 PROCEDURE — 2500000002 HC RX 250 W HCPCS SELF ADMINISTERED DRUGS (ALT 637 FOR MEDICARE OP, ALT 636 FOR OP/ED)

## 2025-06-16 PROCEDURE — 2500000001 HC RX 250 WO HCPCS SELF ADMINISTERED DRUGS (ALT 637 FOR MEDICARE OP): Performed by: EMERGENCY MEDICINE

## 2025-06-16 PROCEDURE — 85730 THROMBOPLASTIN TIME PARTIAL: CPT | Performed by: EMERGENCY MEDICINE

## 2025-06-16 PROCEDURE — 84484 ASSAY OF TROPONIN QUANT: CPT

## 2025-06-16 PROCEDURE — 2500000001 HC RX 250 WO HCPCS SELF ADMINISTERED DRUGS (ALT 637 FOR MEDICARE OP): Mod: SE

## 2025-06-16 PROCEDURE — 71045 X-RAY EXAM CHEST 1 VIEW: CPT | Performed by: RADIOLOGY

## 2025-06-16 PROCEDURE — 99292 CRITICAL CARE ADDL 30 MIN: CPT | Performed by: EMERGENCY MEDICINE

## 2025-06-16 PROCEDURE — 99291 CRITICAL CARE FIRST HOUR: CPT | Mod: 25 | Performed by: EMERGENCY MEDICINE

## 2025-06-16 PROCEDURE — 1200000002 HC GENERAL ROOM WITH TELEMETRY DAILY

## 2025-06-16 PROCEDURE — 71045 X-RAY EXAM CHEST 1 VIEW: CPT

## 2025-06-16 PROCEDURE — 2500000004 HC RX 250 GENERAL PHARMACY W/ HCPCS (ALT 636 FOR OP/ED): Performed by: EMERGENCY MEDICINE

## 2025-06-16 PROCEDURE — 93308 TTE F-UP OR LMTD: CPT | Performed by: EMERGENCY MEDICINE

## 2025-06-16 PROCEDURE — 80053 COMPREHEN METABOLIC PANEL: CPT

## 2025-06-16 PROCEDURE — 96375 TX/PRO/DX INJ NEW DRUG ADDON: CPT | Mod: 59

## 2025-06-16 PROCEDURE — 87086 URINE CULTURE/COLONY COUNT: CPT

## 2025-06-16 PROCEDURE — 5A09357 ASSISTANCE WITH RESPIRATORY VENTILATION, LESS THAN 24 CONSECUTIVE HOURS, CONTINUOUS POSITIVE AIRWAY PRESSURE: ICD-10-PCS | Performed by: EMERGENCY MEDICINE

## 2025-06-16 PROCEDURE — 83880 ASSAY OF NATRIURETIC PEPTIDE: CPT

## 2025-06-16 PROCEDURE — 82435 ASSAY OF BLOOD CHLORIDE: CPT

## 2025-06-16 PROCEDURE — 84443 ASSAY THYROID STIM HORMONE: CPT

## 2025-06-16 PROCEDURE — 83605 ASSAY OF LACTIC ACID: CPT

## 2025-06-16 PROCEDURE — 36415 COLL VENOUS BLD VENIPUNCTURE: CPT

## 2025-06-16 PROCEDURE — 81003 URINALYSIS AUTO W/O SCOPE: CPT

## 2025-06-16 PROCEDURE — 2500000002 HC RX 250 W HCPCS SELF ADMINISTERED DRUGS (ALT 637 FOR MEDICARE OP, ALT 636 FOR OP/ED): Performed by: EMERGENCY MEDICINE

## 2025-06-16 PROCEDURE — 93010 ELECTROCARDIOGRAM REPORT: CPT | Performed by: EMERGENCY MEDICINE

## 2025-06-16 PROCEDURE — 99291 CRITICAL CARE FIRST HOUR: CPT | Performed by: EMERGENCY MEDICINE

## 2025-06-16 PROCEDURE — 83735 ASSAY OF MAGNESIUM: CPT

## 2025-06-16 PROCEDURE — 2500000002 HC RX 250 W HCPCS SELF ADMINISTERED DRUGS (ALT 637 FOR MEDICARE OP, ALT 636 FOR OP/ED): Mod: SE

## 2025-06-16 PROCEDURE — 94640 AIRWAY INHALATION TREATMENT: CPT

## 2025-06-16 PROCEDURE — 96374 THER/PROPH/DIAG INJ IV PUSH: CPT | Mod: 59

## 2025-06-16 PROCEDURE — 84439 ASSAY OF FREE THYROXINE: CPT

## 2025-06-16 RX ORDER — ATORVASTATIN CALCIUM 40 MG/1
40 TABLET, FILM COATED ORAL DAILY
COMMUNITY
Start: 2025-06-08

## 2025-06-16 RX ORDER — HYDRALAZINE HYDROCHLORIDE 20 MG/ML
10 INJECTION INTRAMUSCULAR; INTRAVENOUS ONCE
Status: COMPLETED | OUTPATIENT
Start: 2025-06-16 | End: 2025-06-16

## 2025-06-16 RX ORDER — NAPROXEN SODIUM 220 MG/1
81 TABLET, FILM COATED ORAL DAILY
COMMUNITY
Start: 2025-02-05

## 2025-06-16 RX ORDER — IPRATROPIUM BROMIDE AND ALBUTEROL SULFATE 2.5; .5 MG/3ML; MG/3ML
3 SOLUTION RESPIRATORY (INHALATION) EVERY 20 MIN
Status: COMPLETED | OUTPATIENT
Start: 2025-06-16 | End: 2025-06-16

## 2025-06-16 RX ORDER — METOPROLOL SUCCINATE 50 MG/1
50 TABLET, EXTENDED RELEASE ORAL DAILY
Status: DISCONTINUED | OUTPATIENT
Start: 2025-06-16 | End: 2025-06-17 | Stop reason: HOSPADM

## 2025-06-16 RX ORDER — APIXABAN 5 MG/1
5 TABLET, FILM COATED ORAL 2 TIMES DAILY
COMMUNITY
Start: 2025-06-08

## 2025-06-16 RX ORDER — MAGNESIUM SULFATE HEPTAHYDRATE 40 MG/ML
2 INJECTION, SOLUTION INTRAVENOUS ONCE
Status: COMPLETED | OUTPATIENT
Start: 2025-06-16 | End: 2025-06-16

## 2025-06-16 RX ORDER — LEVOTHYROXINE SODIUM 125 UG/1
TABLET ORAL
COMMUNITY
Start: 2025-04-22

## 2025-06-16 RX ORDER — DICLOFENAC SODIUM 10 MG/G
4 GEL TOPICAL 2 TIMES DAILY PRN
Status: DISCONTINUED | OUTPATIENT
Start: 2025-06-16 | End: 2025-06-17 | Stop reason: HOSPADM

## 2025-06-16 RX ORDER — FUROSEMIDE 20 MG/1
20 TABLET ORAL DAILY
COMMUNITY
Start: 2025-04-22

## 2025-06-16 RX ORDER — METFORMIN HYDROCHLORIDE 500 MG/1
500 TABLET ORAL
Status: DISCONTINUED | OUTPATIENT
Start: 2025-06-17 | End: 2025-06-16

## 2025-06-16 RX ORDER — METOPROLOL TARTRATE 1 MG/ML
5 INJECTION, SOLUTION INTRAVENOUS ONCE
Status: COMPLETED | OUTPATIENT
Start: 2025-06-16 | End: 2025-06-16

## 2025-06-16 RX ORDER — DAPAGLIFLOZIN 10 MG/1
10 TABLET, FILM COATED ORAL DAILY
Status: DISCONTINUED | OUTPATIENT
Start: 2025-06-16 | End: 2025-06-17 | Stop reason: HOSPADM

## 2025-06-16 RX ORDER — METOPROLOL TARTRATE 25 MG/1
12.5 TABLET, FILM COATED ORAL ONCE
Status: DISCONTINUED | OUTPATIENT
Start: 2025-06-16 | End: 2025-06-16

## 2025-06-16 RX ORDER — ATORVASTATIN CALCIUM 40 MG/1
40 TABLET, FILM COATED ORAL DAILY
Status: DISCONTINUED | OUTPATIENT
Start: 2025-06-16 | End: 2025-06-17 | Stop reason: HOSPADM

## 2025-06-16 RX ORDER — POLYETHYLENE GLYCOL 3350 17 G/17G
17 POWDER, FOR SOLUTION ORAL DAILY
Status: DISCONTINUED | OUTPATIENT
Start: 2025-06-16 | End: 2025-06-17 | Stop reason: HOSPADM

## 2025-06-16 RX ORDER — HYDROCHLOROTHIAZIDE 25 MG/1
25 TABLET ORAL DAILY
Status: DISCONTINUED | OUTPATIENT
Start: 2025-06-16 | End: 2025-06-17 | Stop reason: HOSPADM

## 2025-06-16 RX ORDER — CYCLOBENZAPRINE HCL 10 MG
5 TABLET ORAL ONCE
Status: COMPLETED | OUTPATIENT
Start: 2025-06-16 | End: 2025-06-16

## 2025-06-16 RX ORDER — METFORMIN HYDROCHLORIDE 500 MG/1
500 TABLET ORAL
COMMUNITY
Start: 2025-04-22

## 2025-06-16 RX ORDER — MICONAZOLE NITRATE 2 %
2 CREAM (GRAM) TOPICAL EVERY 2 HOUR PRN
Status: DISCONTINUED | OUTPATIENT
Start: 2025-06-16 | End: 2025-06-17 | Stop reason: HOSPADM

## 2025-06-16 RX ORDER — DAPAGLIFLOZIN 10 MG/1
10 TABLET, FILM COATED ORAL DAILY
COMMUNITY
Start: 2025-06-08

## 2025-06-16 RX ORDER — AMLODIPINE BESYLATE 10 MG/1
10 TABLET ORAL DAILY
COMMUNITY
Start: 2025-05-04

## 2025-06-16 RX ORDER — HYDRALAZINE HYDROCHLORIDE 20 MG/ML
INJECTION INTRAMUSCULAR; INTRAVENOUS
Status: COMPLETED
Start: 2025-06-16 | End: 2025-06-16

## 2025-06-16 RX ORDER — FUROSEMIDE 10 MG/ML
INJECTION INTRAMUSCULAR; INTRAVENOUS
Status: COMPLETED
Start: 2025-06-16 | End: 2025-06-16

## 2025-06-16 RX ORDER — HYDROCHLOROTHIAZIDE 25 MG/1
25 TABLET ORAL DAILY
COMMUNITY
Start: 2025-04-22

## 2025-06-16 RX ORDER — SPIRONOLACTONE 25 MG/1
25 TABLET ORAL DAILY
COMMUNITY
Start: 2025-05-16

## 2025-06-16 RX ORDER — IPRATROPIUM BROMIDE AND ALBUTEROL SULFATE 2.5; .5 MG/3ML; MG/3ML
SOLUTION RESPIRATORY (INHALATION)
Status: COMPLETED
Start: 2025-06-16 | End: 2025-06-16

## 2025-06-16 RX ORDER — DIGOXIN 0.25 MG/ML
500 INJECTION INTRAMUSCULAR; INTRAVENOUS ONCE
Status: COMPLETED | OUTPATIENT
Start: 2025-06-16 | End: 2025-06-16

## 2025-06-16 RX ORDER — METOPROLOL SUCCINATE 50 MG/1
50 TABLET, EXTENDED RELEASE ORAL DAILY
Status: DISCONTINUED | OUTPATIENT
Start: 2025-06-17 | End: 2025-06-16

## 2025-06-16 RX ORDER — SPIRONOLACTONE 25 MG/1
25 TABLET ORAL DAILY
Status: DISCONTINUED | OUTPATIENT
Start: 2025-06-16 | End: 2025-06-17 | Stop reason: HOSPADM

## 2025-06-16 RX ORDER — ACETAMINOPHEN 500 MG
500 TABLET ORAL EVERY 6 HOURS PRN
COMMUNITY

## 2025-06-16 RX ORDER — HYDROXYZINE HYDROCHLORIDE 25 MG/1
25 TABLET, FILM COATED ORAL EVERY 6 HOURS PRN
Status: DISCONTINUED | OUTPATIENT
Start: 2025-06-16 | End: 2025-06-17 | Stop reason: HOSPADM

## 2025-06-16 RX ORDER — CYCLOBENZAPRINE HCL 5 MG
5 TABLET ORAL DAILY PRN
COMMUNITY
Start: 2025-04-30

## 2025-06-16 RX ORDER — FUROSEMIDE 10 MG/ML
40 INJECTION INTRAMUSCULAR; INTRAVENOUS ONCE
Status: COMPLETED | OUTPATIENT
Start: 2025-06-16 | End: 2025-06-16

## 2025-06-16 RX ORDER — NAPROXEN SODIUM 220 MG/1
81 TABLET, FILM COATED ORAL DAILY
Status: DISCONTINUED | OUTPATIENT
Start: 2025-06-16 | End: 2025-06-17 | Stop reason: HOSPADM

## 2025-06-16 RX ORDER — AMLODIPINE BESYLATE 10 MG/1
10 TABLET ORAL DAILY
Status: DISCONTINUED | OUTPATIENT
Start: 2025-06-16 | End: 2025-06-17 | Stop reason: HOSPADM

## 2025-06-16 RX ORDER — CYCLOBENZAPRINE HCL 10 MG
5 TABLET ORAL DAILY PRN
Status: DISCONTINUED | OUTPATIENT
Start: 2025-06-16 | End: 2025-06-17 | Stop reason: HOSPADM

## 2025-06-16 RX ORDER — METOPROLOL SUCCINATE 50 MG/1
50 TABLET, EXTENDED RELEASE ORAL DAILY
COMMUNITY
Start: 2025-04-27

## 2025-06-16 RX ORDER — HYDROXYZINE HYDROCHLORIDE 25 MG/1
25 TABLET, FILM COATED ORAL EVERY 6 HOURS PRN
COMMUNITY
Start: 2025-05-16

## 2025-06-16 RX ORDER — METOPROLOL TARTRATE 1 MG/ML
INJECTION, SOLUTION INTRAVENOUS
Status: COMPLETED
Start: 2025-06-16 | End: 2025-06-16

## 2025-06-16 RX ADMIN — FUROSEMIDE 40 MG: 10 INJECTION INTRAMUSCULAR; INTRAVENOUS at 03:53

## 2025-06-16 RX ADMIN — CYCLOBENZAPRINE HYDROCHLORIDE 5 MG: 10 TABLET, FILM COATED ORAL at 08:27

## 2025-06-16 RX ADMIN — ATORVASTATIN CALCIUM 40 MG: 40 TABLET, FILM COATED ORAL at 14:20

## 2025-06-16 RX ADMIN — MAGNESIUM SULFATE HEPTAHYDRATE 2 G: 40 INJECTION, SOLUTION INTRAVENOUS at 04:55

## 2025-06-16 RX ADMIN — IPRATROPIUM BROMIDE AND ALBUTEROL SULFATE 3 ML: .5; 3 SOLUTION RESPIRATORY (INHALATION) at 04:10

## 2025-06-16 RX ADMIN — DIGOXIN 500 MCG: 0.25 INJECTION INTRAMUSCULAR; INTRAVENOUS at 05:11

## 2025-06-16 RX ADMIN — HYDROCHLOROTHIAZIDE 25 MG: 25 TABLET ORAL at 14:23

## 2025-06-16 RX ADMIN — METOPROLOL TARTRATE 5 MG: 1 INJECTION, SOLUTION INTRAVENOUS at 04:09

## 2025-06-16 RX ADMIN — HYDRALAZINE HYDROCHLORIDE 10 MG: 20 INJECTION INTRAMUSCULAR; INTRAVENOUS at 03:55

## 2025-06-16 RX ADMIN — FUROSEMIDE 40 MG: 10 INJECTION, SOLUTION INTRAMUSCULAR; INTRAVENOUS at 03:53

## 2025-06-16 RX ADMIN — IPRATROPIUM BROMIDE AND ALBUTEROL SULFATE 3 ML: .5; 3 SOLUTION RESPIRATORY (INHALATION) at 03:50

## 2025-06-16 RX ADMIN — AMLODIPINE BESYLATE 10 MG: 10 TABLET ORAL at 14:23

## 2025-06-16 RX ADMIN — DAPAGLIFLOZIN 10 MG: 10 TABLET, FILM COATED ORAL at 14:20

## 2025-06-16 RX ADMIN — ASPIRIN 81 MG: 81 TABLET, CHEWABLE ORAL at 14:20

## 2025-06-16 RX ADMIN — METOPROLOL SUCCINATE 50 MG: 50 TABLET, EXTENDED RELEASE ORAL at 09:32

## 2025-06-16 RX ADMIN — IPRATROPIUM BROMIDE AND ALBUTEROL SULFATE 3 ML: .5; 3 SOLUTION RESPIRATORY (INHALATION) at 04:36

## 2025-06-16 RX ADMIN — APIXABAN 5 MG: 5 TABLET, FILM COATED ORAL at 14:20

## 2025-06-16 RX ADMIN — APIXABAN 5 MG: 5 TABLET, FILM COATED ORAL at 20:29

## 2025-06-16 RX ADMIN — SPIRONOLACTONE 25 MG: 25 TABLET, FILM COATED ORAL at 14:20

## 2025-06-16 SDOH — SOCIAL STABILITY: SOCIAL INSECURITY: ARE YOU OR HAVE YOU BEEN THREATENED OR ABUSED PHYSICALLY, EMOTIONALLY, OR SEXUALLY BY ANYONE?: NO

## 2025-06-16 SDOH — SOCIAL STABILITY: SOCIAL INSECURITY: HAVE YOU HAD THOUGHTS OF HARMING ANYONE ELSE?: NO

## 2025-06-16 SDOH — SOCIAL STABILITY: SOCIAL INSECURITY: HAS ANYONE EVER THREATENED TO HURT YOUR FAMILY OR YOUR PETS?: NO

## 2025-06-16 SDOH — SOCIAL STABILITY: SOCIAL INSECURITY: DO YOU FEEL ANYONE HAS EXPLOITED OR TAKEN ADVANTAGE OF YOU FINANCIALLY OR OF YOUR PERSONAL PROPERTY?: NO

## 2025-06-16 SDOH — SOCIAL STABILITY: SOCIAL INSECURITY: HAVE YOU HAD ANY THOUGHTS OF HARMING ANYONE ELSE?: NO

## 2025-06-16 SDOH — SOCIAL STABILITY: SOCIAL INSECURITY: ARE THERE ANY APPARENT SIGNS OF INJURIES/BEHAVIORS THAT COULD BE RELATED TO ABUSE/NEGLECT?: NO

## 2025-06-16 SDOH — SOCIAL STABILITY: SOCIAL INSECURITY: ABUSE: ADULT

## 2025-06-16 SDOH — SOCIAL STABILITY: SOCIAL INSECURITY: DOES ANYONE TRY TO KEEP YOU FROM HAVING/CONTACTING OTHER FRIENDS OR DOING THINGS OUTSIDE YOUR HOME?: NO

## 2025-06-16 SDOH — SOCIAL STABILITY: SOCIAL INSECURITY: DO YOU FEEL UNSAFE GOING BACK TO THE PLACE WHERE YOU ARE LIVING?: NO

## 2025-06-16 ASSESSMENT — ACTIVITIES OF DAILY LIVING (ADL)
HEARING - LEFT EAR: HEARING AID
JUDGMENT_ADEQUATE_SAFELY_COMPLETE_DAILY_ACTIVITIES: YES
BATHING: INDEPENDENT
ADEQUATE_TO_COMPLETE_ADL: YES
FEEDING YOURSELF: INDEPENDENT
HEARING - RIGHT EAR: HEARING AID
WALKS IN HOME: INDEPENDENT
PATIENT'S MEMORY ADEQUATE TO SAFELY COMPLETE DAILY ACTIVITIES?: YES
DRESSING YOURSELF: INDEPENDENT
TOILETING: INDEPENDENT
GROOMING: INDEPENDENT

## 2025-06-16 ASSESSMENT — COGNITIVE AND FUNCTIONAL STATUS - GENERAL
TURNING FROM BACK TO SIDE WHILE IN FLAT BAD: A LITTLE
DAILY ACTIVITIY SCORE: 23
HELP NEEDED FOR BATHING: A LITTLE
MOBILITY SCORE: 22
PATIENT BASELINE BEDBOUND: NO
WALKING IN HOSPITAL ROOM: A LITTLE

## 2025-06-16 ASSESSMENT — LIFESTYLE VARIABLES
EVER HAD A DRINK FIRST THING IN THE MORNING TO STEADY YOUR NERVES TO GET RID OF A HANGOVER: NO
HOW OFTEN DO YOU HAVE 6 OR MORE DRINKS ON ONE OCCASION: NEVER
AUDIT-C TOTAL SCORE: 0
SKIP TO QUESTIONS 9-10: 1
HAVE YOU EVER FELT YOU SHOULD CUT DOWN ON YOUR DRINKING: NO
HOW OFTEN DO YOU HAVE A DRINK CONTAINING ALCOHOL: NEVER
TOTAL SCORE: 0
AUDIT-C TOTAL SCORE: 0
HAVE PEOPLE ANNOYED YOU BY CRITICIZING YOUR DRINKING: NO
EVER FELT BAD OR GUILTY ABOUT YOUR DRINKING: NO
HOW MANY STANDARD DRINKS CONTAINING ALCOHOL DO YOU HAVE ON A TYPICAL DAY: PATIENT DOES NOT DRINK

## 2025-06-16 ASSESSMENT — ENCOUNTER SYMPTOMS: TACHYCARDIA: 1

## 2025-06-16 ASSESSMENT — PATIENT HEALTH QUESTIONNAIRE - PHQ9
2. FEELING DOWN, DEPRESSED OR HOPELESS: NOT AT ALL
SUM OF ALL RESPONSES TO PHQ9 QUESTIONS 1 & 2: 0
1. LITTLE INTEREST OR PLEASURE IN DOING THINGS: NOT AT ALL

## 2025-06-16 ASSESSMENT — PAIN DESCRIPTION - LOCATION: LOCATION: CHEST

## 2025-06-16 ASSESSMENT — PAIN SCALES - GENERAL
PAINLEVEL_OUTOF10: 4
PAINLEVEL_OUTOF10: 0 - NO PAIN
PAINLEVEL_OUTOF10: 4

## 2025-06-16 ASSESSMENT — PAIN - FUNCTIONAL ASSESSMENT
PAIN_FUNCTIONAL_ASSESSMENT: 0-10
PAIN_FUNCTIONAL_ASSESSMENT: 0-10

## 2025-06-16 ASSESSMENT — PAIN DESCRIPTION - PAIN TYPE: TYPE: ACUTE PAIN

## 2025-06-16 ASSESSMENT — PAIN DESCRIPTION - DESCRIPTORS: DESCRIPTORS: ACHING;SQUEEZING

## 2025-06-16 NOTE — ED PROVIDER NOTES
Emergency Department Provider Note          History of Present Illness     CC: Shortness of Breath     History provided by: Patient and EMS  Limitations to History: Respiratory Distress    HPI:   Gifty Crenshaw is a 89 y.o.female with PMH HFpEF, afib (on metoprolol) on eliquis, presenting to the Emergency Department for respiratory distress.  Brought in today by EMS.  Reports around 15 minutes before EMS arrival, became acutely hypoxic and had increased work of breathing.  Smokes cigarettes but no noted history of COPD.  Has had multiple ED visits for CHF exacerbations.  Placed on BiPAP before arrival with improvement in her work of breathing.  Reports she has been compliant with all her medications and took her last metoprolol/Eliquis this evening.      Records Reviewed: Recent available ED and inpatient notes reviewed in EMR.    PMHx/PSHx:  Per HPI.   - has no past medical history on file.  - has no past surgical history on file.  - does not have a problem list on file.    Medications:  No current outpatient medications     Allergies:  Patient has no known allergies.    Social History:  - Tobacco:  has no history on file for tobacco use.   - Alcohol:  has no history on file for alcohol use.   - Illicit Drugs:  has no history on file for drug use.     ROS:  Per HPI.       Physical Exam     Triage Vitals:  T 36.4 °C (97.5 °F)  HR (!) 152  BP (!) 171/116  RR (!) 43  O2 98 % Supplemental oxygen    General: Ill-appearing, diaphoretic, increased work of breathing.  Eyes: Gaze conjugate.  No scleral icterus or injection  HENT: Normo-cephalic, atraumatic. No stridor  CV: Tachycardic rate, irregular rhythm. Radial pulses 2+ bilaterally  Resp: Tachypneic, diminished breath sounds bilaterally with end expiratory wheeze and crackles heard to the bilateral lung bases.  GI: Soft, non-distended, non-tender. No rebound or guarding.  MSK/Extremities: No gross bony deformities. Moving all extremities  Skin: Warm. Appropriate  color  Neuro: Alert. Oriented. Face symmetric. Speech is fluent.  Gross strength and sensation intact in b/l UE and LEs  Psych: Appropriate mood and affect          [place] Coma Scale Score: 15                    Medical Decision Making & ED Course     EKG: EKG interpreted by myself. If applicable, please see ED Course for full interpretation.    Medical Decision Making   Gifty Crenshaw is a 89 y.o.female presenting to the Emergency Department for respiratory distress.  On arrival, initial blood pressure 171/116, tachypneic to 40.  Heart rate is 152 regularly irregular consistent with A-fib RVR.  Given additional doses of hydralazine for blood pressure control and metoprolol for rate control.  Point-of-care ultrasound showed good left ventricular ejection fraction. Point-of-care ultrasound also showed florid B-lines consistent with likely fluid overload from CHF exacerbation.  Given 40 mg Lasix for diuresis.  Initial venous blood gas showed a pH of 7.29, PCO2 of 49, and bicarb of 23.6.  Given her minor wheezing, started on DuoNebs.  Lower concern for Major obstructive process/COPD exacerbation today.  Continued on BiPAP for positive pressure support will get basic labs, chest x-ray, and reassess 1 hour.    Update: Labs in the ED notable for a white count of 11.3, lower concern for systemic infectious process.  Hemoglobin stable at 13.4, lower concern for acute blood loss anemia.  Chemistries notable for creatinine of 1.25 consistent with acute kidney injury.  Troponin within normal limits, lower concern for ACS today.  Chest x-ray showed bilateral pulmonary edema and interstitial infiltrates consistent with CHF exacerbation.  BNP markedly elevated at 348 as well.  After diuresis, patient's work of breathing is notably improving.  Initially after receiving hydralazine and rate control with metoprolol, did become hypotensive 90s over 50s.  Tachycardic to 170 which I believe was reflex in nature.  After digoxin in the ED,  patient's heart rate decreased down to the 110s and blood pressure is slowly improving with a systolic of 110s over 80s.  Will plan for CICU admission today for further assessment.      ED Course as of 06/16/25 0700 Mon Jun 16, 2025   0437 Gifty Crenshaw [birth year] [AM]   0438 MRN: [id number] [AS]   0515 EKG interpreted by me: Atrial flutter with variable conduction.  Rate of 142.  Irregular rhythm.  QRS, QTc intervals within normal limits.  No ST elevations, depressions, or acute inversions. [AS]   0654 Repeat EKG interpreted by me: Tachycardic to 107, irregular rhythm.  QRS, QTc intervals within normal limits.  No ST elevations, depressions, or T wave inversions.  Atrial fibrillation with RVR versus atrial flutter with variable conduction seen. [AS]      ED Course User Index  [AM] Gigi Peterson MD  [AS] Juan Mauro MD         Diagnoses as of 06/16/25 0700   Hypoxia   Atrial fibrillation with rapid ventricular response (Multi)       Independent Result Review and Interpretation: Relevant laboratory and radiographic results were reviewed and independently interpreted by myself.  As necessary, they are commented on in the ED Course.    Chronic conditions affecting the patient's care: As documented above in MDM.      Disposition   Admit    Juan Mauro MD  Emergency Medicine PGY3      Procedures     Procedures ? SmartLinks last updated 6/16/2025 7:00 AM        Juan Mauro MD  Resident  06/16/25 0700

## 2025-06-16 NOTE — PROGRESS NOTES
Pharmacy Medication History Review    Gifty Crenshaw is a 89 y.o. female admitted for Hypoxia. Pharmacy reviewed the patient's [id number] medications and allergies for accuracy.    Medications ADDED:  Tylenol, D3, Docusate  Medications CHANGED:  None  Medications REMOVED:   None     The list below reflects the updated PTA list.   Prior to Admission Medications   Prescriptions Last Dose Informant   CHOLECALCIFEROL, VITAMIN D3, ORAL  Child   Sig: Take by mouth once daily.   Eliquis 5 mg tablet 6/15/2025 Child   Sig: Take 1 tablet (5 mg) by mouth 2 times a day.   Farxiga 10 mg tablet 6/15/2025 Child   Sig: Take 1 tablet (10 mg) by mouth once daily.   acetaminophen (Tylenol) 500 mg tablet  Child   Sig: Take 1 tablet (500 mg) by mouth every 6 hours if needed for mild pain (1 - 3).   amLODIPine (Norvasc) 10 mg tablet 6/15/2025 Child   Sig: Take 1 tablet (10 mg) by mouth once daily.   aspirin 81 mg chewable tablet 6/15/2025 Child   Sig: Chew 1 tablet (81 mg) once daily. CHEW AND SWALLOW   atorvastatin (Lipitor) 40 mg tablet 6/15/2025 Child   Sig: Take 1 tablet (40 mg) by mouth once daily.   cyclobenzaprine (Flexeril) 5 mg tablet  Child   Sig: Take 1 tablet (5 mg) by mouth once daily as needed.   docusate sodium (Colace) 50 mg capsule  Child   Sig: Take 1 capsule (50 mg) by mouth once daily as needed for constipation.   furosemide (Lasix) 20 mg tablet 6/15/2025 Child   Sig: Take 1 tablet (20 mg) by mouth once daily.   hydrOXYzine HCL (Atarax) 25 mg tablet  Child   Sig: Take 1 tablet (25 mg) by mouth every 6 hours if needed.   hydroCHLOROthiazide (HYDRODiuril) 25 mg tablet 6/15/2025 Child   Sig: Take 1 tablet (25 mg) by mouth once daily.   levothyroxine (Synthroid, Levoxyl) 125 mcg tablet 6/15/2025 Child   Sig: TAKE 1/2 TABLET BY MOUTH EVERY DAY X 6 DAYS AND 1 FULL TABLET ON DAY 7   metFORMIN (Glucophage) 500 mg tablet 6/15/2025 Child   Sig: Take 1 tablet (500 mg) by mouth once daily with breakfast.   metoprolol  "succinate XL (Toprol-XL) 50 mg 24 hr tablet 6/15/2025 Child   Sig: Take 1 tablet (50 mg) by mouth once daily.   spironolactone (Aldactone) 25 mg tablet 6/15/2025 Child   Sig: Take 1 tablet (25 mg) by mouth once daily.      Facility-Administered Medications: None        The list below reflects the updated allergy list. Please review each documented allergy for additional clarification and justification.  Allergies  Reviewed by Surinder Cuba PharmD on 6/16/2025   No Known Allergies         Sources:   [facility]  Pharmacy dispense history  Patient Interview Unable to provide any details  Child  Moderate historian - had list at home was able to confirm what patient was taking, reported last home doses of maintenance medications as 6/15/2025  Chart Review 6/13 Internal medicine note reviewed (MRN [id number])    Surinder Cuba PharmD  Transitions of Care Pharmacist  06/16/25     Secure Chat preferred   If no response call [id number] or Vocera \"Med Rec\"    "

## 2025-06-16 NOTE — H&P
History and Physical      Gifty Crenshaw  :  1934(89 y.o.)  MRN:  [id number]    PCP: Martha Melvin, LEONA-THOMAS       Date: 25     Subjective:      HPI:  Gifty Crenshaw is a 89 y.o.female with PMH HFpEF, afib on eliquis brought in by EMS for shortness of breath. She states she has been intermittently short of breath since her lasix was changed from scheduled to as needed, but called EMS because the lasix did not help this morning. She denied any cough, fevers, or leg swelling. She takes her medications as scheduled. She said she does smoke 5-10 cigarettes daily, and has been doing this for about 70 years. She lives with her two sons who help her at home. She denied dizziness, lightheadedness, vision changes, headache, chest pain, palpitations, abdominal pain, n/v, dysuria, hematuria, blood in bowels.     In the ED, ecg patient initially in AFib with RVR, requiring BiPAP. CBC showed elevated absolute lymphocytes and monocytes, but otherwise unremarkable. CMP should a creatinine of 1.25 (basline 1-1.2), and . She was given metop 5 mg IV, hydralazine 10 mg IV, digoxin 500 mcg, and lasix 40 mg IV. She was given mag sulfate and a dose of duonebs. ECG remained in Afib, however RVR resolved. She was also able to come off BiPAP and placed on 2-4 L NC.     Problem List[1]   Medical History[2]  Surgical History[3]  Current Outpatient Medications   Medication Instructions    acetaminophen (TYLENOL) 500 mg, oral, Every 6 hours PRN    amLODIPine (NORVASC) 10 mg, oral, Daily    aspirin 81 mg, oral, Daily, CHEW AND SWALLOW    atorvastatin (LIPITOR) 40 mg, oral, Daily    CHOLECALCIFEROL, VITAMIN D3, ORAL oral, Daily    cyclobenzaprine (FLEXERIL) 5 mg, oral, Daily PRN    docusate sodium (COLACE) 50 mg, oral, Daily PRN    Eliquis 5 mg, oral, 2 times daily    Farxiga 10 mg, oral, Daily    furosemide (LASIX) 20 mg, oral, Daily    hydroCHLOROthiazide (HYDRODIURIL) 25 mg, oral, Daily    hydrOXYzine HCL (ATARAX) 25 mg, oral,  Every 6 hours PRN    levothyroxine (Synthroid, Levoxyl) 125 mcg tablet TAKE 1/2 TABLET BY MOUTH EVERY DAY X 6 DAYS AND 1 FULL TABLET ON DAY 7    metFORMIN (GLUCOPHAGE) 500 mg, oral, Daily with breakfast    metoprolol succinate XL (TOPROL-XL) 50 mg, oral, Daily    spironolactone (ALDACTONE) 25 mg, oral, Daily      RX Allergies[4]   Social History[5]  Family History[6]    Scheduled Medications:   Scheduled Medications[7]     Continuous Medications:   Continuous Medications[8]     PRN Medications:   PRN Medications[9]    Review of Systems:  Review of Systems   Ten point review of systems negative unless specified in HPI.     Objective:     Vitals:    06/16/25 0930 06/16/25 1000 06/16/25 1130 06/16/25 1300   BP: 114/74 101/78 112/61 102/67   BP Location:   Right arm Right arm   Patient Position:   Lying Lying   Pulse: (!) 107 86 93 81   Resp: 19 19 10 17   Temp:       TempSrc:       SpO2: 97% 97% 99% 99%   Weight:       Height:            24hr Min/Max:  Temp  Min: 36.4 °C (97.5 °F)  Max: 36.4 °C (97.5 °F)  Pulse  Min: 81  Max: 160  BP  Min: 83/63  Max: 171/116  Resp  Min: 10  Max: 43  SpO2  Min: 96 %  Max: 100 %    No intake or output data in the 24 hours ending 06/16/25 3709    Physical Exam  General:  Well developed. Alert. No acute distress.  Skin:  Warm, dry. normal skin turgor. no rashes. no lesions.   Head: NCAT  EENT: MMM  Cardiovascular:  Irregular rate and rhythm, normal S1 and S2, no gallops, no murmurs and no pericardial rub.  Pulmonary:  CTAB in all fields  Abdomen:  (+) BS. soft. non-tender. non-distended. no abdominal masses. no guarding or rigidity.  Neurologic:  grossly intact  Musculoskeletal: moves all extremities spontaneously  Psychiatric:  appropriate mood and affect    Labs:   Results for orders placed or performed during the hospital encounter of 06/16/25 (from the past 24 hours)   Blood Gas Venous Full Panel Unsolicited   Result Value Ref Range    POCT pH, Venous 7.29 (L) 7.33 - 7.43 pH    POCT  pCO2, Venous 49 41 - 51 mm Hg    POCT pO2, Venous 52 (H) 35 - 45 mm Hg    POCT SO2, Venous 81 (H) 45 - 75 %    POCT Oxy Hemoglobin, Venous 78.4 (H) 45.0 - 75.0 %    POCT Hematocrit Calculated, Venous 41.0 36.0 - 46.0 %    POCT Sodium, Venous 137 136 - 145 mmol/L    POCT Potassium, Venous 5.9 (H) 3.5 - 5.3 mmol/L    POCT Chloride, Venous 110 (H) 98 - 107 mmol/L    POCT Ionized Calicum, Venous 1.24 1.10 - 1.33 mmol/L    POCT Glucose, Venous 151 (H) 74 - 99 mg/dL    POCT Lactate, Venous 2.0 0.4 - 2.0 mmol/L    POCT Base Excess, Venous -3.4 (L) -2.0 - 3.0 mmol/L    POCT HCO3 Calculated, Venous 23.6 22.0 - 26.0 mmol/L    POCT Hemoglobin, Venous 13.7 12.0 - 16.0 g/dL    POCT Anion Gap, Venous 9.0 (L) 10.0 - 25.0 mmol/L    Patient Temperature 37.0 degrees Celsius   CBC and Auto Differential   Result Value Ref Range    WBC 11.3 4.4 - 11.3 x10*3/uL    nRBC 0.0 0.0 - 0.0 /100 WBCs    RBC 4.89 4.00 - 5.20 x10*6/uL    Hemoglobin 13.4 12.0 - 16.0 g/dL    Hematocrit 42.2 36.0 - 46.0 %    MCV 86 80 - 100 fL    MCH 27.4 26.0 - 34.0 pg    MCHC 31.8 (L) 32.0 - 36.0 g/dL    RDW 17.0 (H) 11.5 - 14.5 %    Platelets 227 150 - 450 x10*3/uL    Neutrophils % 40.8 40.0 - 80.0 %    Immature Granulocytes %, Automated 0.7 0.0 - 0.9 %    Lymphocytes % 48.8 13.0 - 44.0 %    Monocytes % 8.9 2.0 - 10.0 %    Eosinophils % 0.5 0.0 - 6.0 %    Basophils % 0.3 0.0 - 2.0 %    Neutrophils Absolute 4.60 1.60 - 5.50 x10*3/uL    Immature Granulocytes Absolute, Automated 0.08 0.00 - 0.50 x10*3/uL    Lymphocytes Absolute 5.51 (H) 0.80 - 3.00 x10*3/uL    Monocytes Absolute 1.00 (H) 0.05 - 0.80 x10*3/uL    Eosinophils Absolute 0.06 0.00 - 0.40 x10*3/uL    Basophils Absolute 0.03 0.00 - 0.10 x10*3/uL   Comprehensive Metabolic Panel   Result Value Ref Range    Glucose 134 (H) 74 - 99 mg/dL    Sodium 140 136 - 145 mmol/L    Potassium 4.7 3.5 - 5.3 mmol/L    Chloride 107 98 - 107 mmol/L    Bicarbonate 22 21 - 32 mmol/L    Anion Gap 16 10 - 20 mmol/L    Urea Nitrogen  18 6 - 23 mg/dL    Creatinine 1.25 (H) 0.50 - 1.05 mg/dL    eGFR 41 (L) >60 mL/min/1.73m*2    Calcium 9.9 8.6 - 10.6 mg/dL    Albumin 4.4 3.4 - 5.0 g/dL    Alkaline Phosphatase 84 33 - 136 U/L    Total Protein 8.6 (H) 6.4 - 8.2 g/dL    AST 17 9 - 39 U/L    Bilirubin, Total 0.7 0.0 - 1.2 mg/dL    ALT 10 7 - 45 U/L   Magnesium   Result Value Ref Range    Magnesium 2.19 1.60 - 2.40 mg/dL   B-type natriuretic peptide   Result Value Ref Range     (H) 0 - 99 pg/mL   Troponin I, High Sensitivity, Initial   Result Value Ref Range    Troponin I, High Sensitivity (CMC) 9 0 - 34 ng/L   Troponin, High Sensitivity, 1 Hour   Result Value Ref Range    Troponin I, High Sensitivity (CMC) 13 0 - 34 ng/L   Coagulation Screen   Result Value Ref Range    Protime 13.6 (H) 9.8 - 12.4 seconds    INR 1.2 (H) 0.9 - 1.1    aPTT 30 26 - 36 seconds   Blood Gas Venous Full Panel Unsolicited   Result Value Ref Range    POCT pH, Venous 7.38 7.33 - 7.43 pH    POCT pCO2, Venous 38 (L) 41 - 51 mm Hg    POCT pO2, Venous 60 (H) 35 - 45 mm Hg    POCT SO2, Venous 90 (H) 45 - 75 %    POCT Oxy Hemoglobin, Venous 86.9 (H) 45.0 - 75.0 %    POCT Hematocrit Calculated, Venous 40.0 36.0 - 46.0 %    POCT Sodium, Venous 135 (L) 136 - 145 mmol/L    POCT Potassium, Venous 7.5 (HH) 3.5 - 5.3 mmol/L    POCT Chloride, Venous 108 (H) 98 - 107 mmol/L    POCT Ionized Calicum, Venous 1.17 1.10 - 1.33 mmol/L    POCT Glucose, Venous 177 (H) 74 - 99 mg/dL    POCT Lactate, Venous 1.5 0.4 - 2.0 mmol/L    POCT Base Excess, Venous -2.3 (L) -2.0 - 3.0 mmol/L    POCT HCO3 Calculated, Venous 22.5 22.0 - 26.0 mmol/L    POCT Hemoglobin, Venous 13.2 12.0 - 16.0 g/dL    POCT Anion Gap, Venous 12.0 10.0 - 25.0 mmol/L    Patient Temperature 37.0 degrees Celsius   Urinalysis with Reflex Culture and Microscopic   Result Value Ref Range    Color, Urine Colorless (N) Light-Yellow, Yellow, Dark-Yellow    Appearance, Urine Clear Clear    Specific Gravity, Urine 1.005 1.005 - 1.035     pH, Urine 5.5 5.0, 5.5, 6.0, 6.5, 7.0, 7.5, 8.0    Protein, Urine NEGATIVE NEGATIVE, 10 (TRACE), 20 (TRACE) mg/dL    Glucose, Urine 300 (3+) (A) Normal mg/dL    Blood, Urine NEGATIVE NEGATIVE mg/dL    Ketones, Urine NEGATIVE NEGATIVE mg/dL    Bilirubin, Urine NEGATIVE NEGATIVE mg/dL    Urobilinogen, Urine Normal Normal mg/dL    Nitrite, Urine NEGATIVE NEGATIVE    Leukocyte Esterase, Urine 25 Jacqueline/uL (A) NEGATIVE   Extra Urine Gray Tube   Result Value Ref Range    Extra Tube 293    Microscopic Only, Urine   Result Value Ref Range    WBC, Urine 1-5 1-5, NONE /HPF    RBC, Urine 1-2 NONE, 1-2, 3-5 /HPF    Squamous Epithelial Cells, Urine 1-9 (SPARSE) Reference range not established. /HPF   Blood Gas Venous Full Panel   Result Value Ref Range    POCT pH, Venous 7.35 7.33 - 7.43 pH    POCT pCO2, Venous 47 41 - 51 mm Hg    POCT pO2, Venous 39 35 - 45 mm Hg    POCT SO2, Venous 58 45 - 75 %    POCT Oxy Hemoglobin, Venous 57.2 45.0 - 75.0 %    POCT Hematocrit Calculated, Venous 41.0 36.0 - 46.0 %    POCT Sodium, Venous 140 136 - 145 mmol/L    POCT Potassium, Venous 4.0 3.5 - 5.3 mmol/L    POCT Chloride, Venous 107 98 - 107 mmol/L    POCT Ionized Calicum, Venous 1.25 1.10 - 1.33 mmol/L    POCT Glucose, Venous 127 (H) 74 - 99 mg/dL    POCT Lactate, Venous 1.9 0.4 - 2.0 mmol/L    POCT Base Excess, Venous -0.2 -2.0 - 3.0 mmol/L    POCT HCO3 Calculated, Venous 25.9 22.0 - 26.0 mmol/L    POCT Hemoglobin, Venous 13.6 12.0 - 16.0 g/dL    POCT Anion Gap, Venous 11.0 10.0 - 25.0 mmol/L    Patient Temperature 37.0 degrees Celsius    FiO2 30 %       Imaging:   Point of Care Ultrasound  Result Date: 6/16/2025  Coby Gottlieb MD     6/16/2025  8:31 AM Performed by: Coby Gottlieb MD Authorized by: Gigi Peterson MD  Cardiac Indications: tachycardia Procedure: Cardiac Ultrasound Findings:  Views: parasternal long, parasternal short and apical four Effusion: The pericardial space was visualized and was positive for a PERICARDIAL  EFFUSION. Activity: Ventricular contractions were visualized. LV: LV systolic function was NORMAL. RV: RV size was NORMAL. Impression: Cardiac: The focused cardiac ultrasound exam had ABNORMAL findings as specified.     XR chest 1 view  Result Date: 6/16/2025  Interpreted By:  Karsten Mares and Hofer Lindsay STUDY: XR CHEST 1 VIEW;  6/16/2025 5:08 am   INDICATION: Signs/Symptoms:hypoxia.   COMPARISON: None.   ACCESSION NUMBER(S): [id number]   ORDERING CLINICIAN: DAISY BORDEN   FINDINGS: AP radiograph of the chest was provided.     CARDIOMEDIASTINAL SILHOUETTE: The cardiomediastinal silhouette is prominent in size and appearance.   LUNGS: Prominent perihilar and interstitial lung markings. Trace blunting of the bilateral costophrenic angles. No evidence of pneumothorax.   ABDOMEN: No remarkable upper abdominal findings.   BONES: No acute osseous changes.       1.  Findings of interstitial pulmonary edema and small bilateral pleural effusions. 2. The cardiomediastinal silhouette is prominent in size and configuration, which could represent cardiomegaly.   I personally reviewed the images/study and resident's interpretation and I agree with the findings as stated by Sandee Dugan MD (resident radiologist). This study was analyzed and interpreted at [facility], [place], [place].   MACRO: None   Signed by: Karsten Mares 6/16/2025 8:12 AM Dictation workstation:   [id number]      Assessment and Plan     89 y.o. female with h/o Afib, hypothyroidism, and HFpEF presenting for SOB. Admitted to medicine for further management for presumed CHF exacerbation vs SCAPE given initial BPs. Determined to be hemodynamically stable and appropriate for regular nursing floor.    Acute hypoxic respiratory failure  CHF exacerbation  - , CXR with bilateral pleural effusions and interstitial pulmonary edema  - Echo 4/20/25:  Left ventricular ejection fraction is normal, by visual estimate  at 60-65%. There is normal right ventricular global systolic function. The left atrium is mildly dilated. Right ventricular systolic pressure is within normal limits.  - s/p lasix, magnesium, and duonebs in the ED  - continue to diurese as tolerated  - continue oxygen PRN, wean as tolerated    Afib with RVR (now resolved)  Chronic Afib   - s/p metoprolol 5 mg IV, digoxin 500 mcg IV   - continue home eliquis and metoprolol succ.   - admit to tele    HFpEF   Htn  HLD  - continue home meds    Hypothyroidism  - TSH with reflex added on given Afib with RVR  - continue home levothyroxine    LB  - hydroxyzine PRN    Tobacco use disorder   - counseled on cessation  - nicotine gum     DVT Prophylaxis: home eliquis    BMI Classification: Body mass index is 27.12 kg/m². - overweight BMI 25-29.9  Code status: Full Code  Diet: Adult diet Cardiac; 70 gm fat; 2 - 3 grams Sodium    Disposition: Admit to inpatient and  await clinical improvement and treatment response      Chris Ha MD  Family Medicine Resident       [1]   Patient Active Problem List  Diagnosis    Hypoxia   [2] No past medical history on file.  [3] No past surgical history on file.  [4] No Known Allergies  [5]    [6] No family history on file.  [7] amLODIPine, 10 mg, oral, Daily  apixaban, 5 mg, oral, BID  aspirin, 81 mg, oral, Daily  atorvastatin, 40 mg, oral, Daily  dapagliflozin propanediol, 10 mg, oral, Daily  hydroCHLOROthiazide, 25 mg, oral, Daily  levothyroxine, 62.5 mcg, oral, Daily  metoprolol succinate XL, 50 mg, oral, Daily  polyethylene glycol, 17 g, oral, Daily  spironolactone, 25 mg, oral, Daily  [8]    [9] PRN medications: cyclobenzaprine, hydrOXYzine HCL, nicotine polacrilex

## 2025-06-16 NOTE — ED PROCEDURE NOTE
Procedure  Critical Care    Performed by: Gigi Peterson MD  Authorized by: Gigi Peterson MD    Critical care provider statement:     Critical care time (minutes):  52    Critical care time was exclusive of:  Separately billable procedures and treating other patients and teaching time    Critical care was necessary to treat or prevent imminent or life-threatening deterioration of the following conditions:  Respiratory failure and cardiac failure    Critical care was time spent personally by me on the following activities:  Blood draw for specimens, development of treatment plan with patient or surrogate, evaluation of patient's response to treatment, examination of patient, obtaining history from patient or surrogate, ordering and performing treatments and interventions, ordering and review of laboratory studies, ordering and review of radiographic studies, pulse oximetry, re-evaluation of patient's condition and review of old charts             Gigi Peterson MD  06/16/25 0657       Gigi Peterson MD  06/16/25 0657

## 2025-06-16 NOTE — ED PROCEDURE NOTE
Procedure    Performed by: Coby Gottlieb MD  Authorized by: Gigi Peterson MD    Cardiac Indications: tachycardia                  Procedure: Cardiac Ultrasound    Findings:   Views: parasternal long, parasternal short and apical four  Effusion: The pericardial space was visualized and was positive for a PERICARDIAL EFFUSION.  Activity: Ventricular contractions were visualized.  LV: LV systolic function was NORMAL.  RV: RV size was NORMAL.    Impression:  Cardiac: The focused cardiac ultrasound exam had ABNORMAL findings as specified.                   Coby Gottlieb MD  06/16/25 3330       Coby Gottlieb MD  06/16/25 0830

## 2025-06-16 NOTE — ED TRIAGE NOTES
Patient brought in by [facility] for shortness of breath and chest pain. Per EMS patient's SOB began 15 min prior to their arrival. Patient has known hx of afib. Patient brought in on CPAP. Patient tachypneic and diaphoretic. Resident and RT at bedside.

## 2025-06-17 ENCOUNTER — DOCUMENTATION (OUTPATIENT)
Dept: HOME HEALTH SERVICES | Facility: HOME HEALTH | Age: 89
End: 2025-06-17
Payer: COMMERCIAL

## 2025-06-17 ENCOUNTER — HOME HEALTH ADMISSION (OUTPATIENT)
Dept: HOME HEALTH SERVICES | Facility: HOME HEALTH | Age: 89
End: 2025-06-17
Payer: COMMERCIAL

## 2025-06-17 ENCOUNTER — APPOINTMENT (OUTPATIENT)
Dept: CARDIOLOGY | Facility: HOSPITAL | Age: OVER 89
End: 2025-06-17
Payer: COMMERCIAL

## 2025-06-17 VITALS
HEIGHT: 66 IN | OXYGEN SATURATION: 97 % | HEART RATE: 70 BPM | SYSTOLIC BLOOD PRESSURE: 122 MMHG | WEIGHT: 168 LBS | BODY MASS INDEX: 27 KG/M2 | RESPIRATION RATE: 18 BRPM | TEMPERATURE: 97.3 F | DIASTOLIC BLOOD PRESSURE: 77 MMHG

## 2025-06-17 LAB
ALBUMIN SERPL BCP-MCNC: 4 G/DL (ref 3.4–5)
ANION GAP SERPL CALC-SCNC: 17 MMOL/L (ref 10–20)
BACTERIA UR CULT: NORMAL
BASOPHILS # BLD AUTO: 0.03 X10*3/UL (ref 0–0.1)
BASOPHILS NFR BLD AUTO: 0.3 %
BUN SERPL-MCNC: 21 MG/DL (ref 6–23)
CALCIUM SERPL-MCNC: 9.6 MG/DL (ref 8.6–10.6)
CHLORIDE SERPL-SCNC: 104 MMOL/L (ref 98–107)
CO2 SERPL-SCNC: 21 MMOL/L (ref 21–32)
CREAT SERPL-MCNC: 1.42 MG/DL (ref 0.5–1.05)
EGFRCR SERPLBLD CKD-EPI 2021: 35 ML/MIN/1.73M*2
EOSINOPHIL # BLD AUTO: 0.02 X10*3/UL (ref 0–0.4)
EOSINOPHIL NFR BLD AUTO: 0.2 %
ERYTHROCYTE [DISTWIDTH] IN BLOOD BY AUTOMATED COUNT: 16.7 % (ref 11.5–14.5)
GLUCOSE SERPL-MCNC: 104 MG/DL (ref 74–99)
HCT VFR BLD AUTO: 42.5 % (ref 36–46)
HGB BLD-MCNC: 13.7 G/DL (ref 12–16)
IMM GRANULOCYTES # BLD AUTO: 0.07 X10*3/UL (ref 0–0.5)
IMM GRANULOCYTES NFR BLD AUTO: 0.7 % (ref 0–0.9)
LYMPHOCYTES # BLD AUTO: 2.95 X10*3/UL (ref 0.8–3)
LYMPHOCYTES NFR BLD AUTO: 30 %
MAGNESIUM SERPL-MCNC: 2.36 MG/DL (ref 1.6–2.4)
MCH RBC QN AUTO: 27.3 PG (ref 26–34)
MCHC RBC AUTO-ENTMCNC: 32.2 G/DL (ref 32–36)
MCV RBC AUTO: 85 FL (ref 80–100)
MONOCYTES # BLD AUTO: 1.13 X10*3/UL (ref 0.05–0.8)
MONOCYTES NFR BLD AUTO: 11.5 %
NEUTROPHILS # BLD AUTO: 5.63 X10*3/UL (ref 1.6–5.5)
NEUTROPHILS NFR BLD AUTO: 57.3 %
NRBC BLD-RTO: 0 /100 WBCS (ref 0–0)
PHOSPHATE SERPL-MCNC: 3.3 MG/DL (ref 2.5–4.9)
PLATELET # BLD AUTO: 246 X10*3/UL (ref 150–450)
POTASSIUM SERPL-SCNC: 3.6 MMOL/L (ref 3.5–5.3)
RBC # BLD AUTO: 5.01 X10*6/UL (ref 4–5.2)
SODIUM SERPL-SCNC: 138 MMOL/L (ref 136–145)
WBC # BLD AUTO: 9.8 X10*3/UL (ref 4.4–11.3)

## 2025-06-17 PROCEDURE — 85025 COMPLETE CBC W/AUTO DIFF WBC: CPT | Performed by: STUDENT IN AN ORGANIZED HEALTH CARE EDUCATION/TRAINING PROGRAM

## 2025-06-17 PROCEDURE — 2500000002 HC RX 250 W HCPCS SELF ADMINISTERED DRUGS (ALT 637 FOR MEDICARE OP, ALT 636 FOR OP/ED): Mod: SE

## 2025-06-17 PROCEDURE — 2500000004 HC RX 250 GENERAL PHARMACY W/ HCPCS (ALT 636 FOR OP/ED): Mod: SE

## 2025-06-17 PROCEDURE — 2500000001 HC RX 250 WO HCPCS SELF ADMINISTERED DRUGS (ALT 637 FOR MEDICARE OP): Mod: SE

## 2025-06-17 PROCEDURE — 2500000004 HC RX 250 GENERAL PHARMACY W/ HCPCS (ALT 636 FOR OP/ED): Mod: SE | Performed by: STUDENT IN AN ORGANIZED HEALTH CARE EDUCATION/TRAINING PROGRAM

## 2025-06-17 PROCEDURE — 80069 RENAL FUNCTION PANEL: CPT | Performed by: STUDENT IN AN ORGANIZED HEALTH CARE EDUCATION/TRAINING PROGRAM

## 2025-06-17 PROCEDURE — 2500000001 HC RX 250 WO HCPCS SELF ADMINISTERED DRUGS (ALT 637 FOR MEDICARE OP): Mod: SE | Performed by: STUDENT IN AN ORGANIZED HEALTH CARE EDUCATION/TRAINING PROGRAM

## 2025-06-17 PROCEDURE — 83735 ASSAY OF MAGNESIUM: CPT | Performed by: STUDENT IN AN ORGANIZED HEALTH CARE EDUCATION/TRAINING PROGRAM

## 2025-06-17 PROCEDURE — 36415 COLL VENOUS BLD VENIPUNCTURE: CPT | Performed by: STUDENT IN AN ORGANIZED HEALTH CARE EDUCATION/TRAINING PROGRAM

## 2025-06-17 PROCEDURE — 99239 HOSP IP/OBS DSCHRG MGMT >30: CPT | Performed by: STUDENT IN AN ORGANIZED HEALTH CARE EDUCATION/TRAINING PROGRAM

## 2025-06-17 RX ORDER — DICLOFENAC SODIUM 10 MG/G
4 GEL TOPICAL 2 TIMES DAILY PRN
Qty: 4 G | Refills: 1 | Status: SHIPPED | OUTPATIENT
Start: 2025-06-17

## 2025-06-17 RX ORDER — FUROSEMIDE 10 MG/ML
40 INJECTION INTRAMUSCULAR; INTRAVENOUS EVERY 12 HOURS
Status: DISCONTINUED | OUTPATIENT
Start: 2025-06-17 | End: 2025-06-17 | Stop reason: HOSPADM

## 2025-06-17 RX ADMIN — AMLODIPINE BESYLATE 10 MG: 10 TABLET ORAL at 08:07

## 2025-06-17 RX ADMIN — SPIRONOLACTONE 25 MG: 25 TABLET, FILM COATED ORAL at 08:07

## 2025-06-17 RX ADMIN — ASPIRIN 81 MG: 81 TABLET, CHEWABLE ORAL at 08:07

## 2025-06-17 RX ADMIN — LEVOTHYROXINE SODIUM 62.5 MCG: 0.05 TABLET ORAL at 05:56

## 2025-06-17 RX ADMIN — DICLOFENAC SODIUM 4 G: 10 GEL TOPICAL at 00:27

## 2025-06-17 RX ADMIN — ATORVASTATIN CALCIUM 40 MG: 40 TABLET, FILM COATED ORAL at 08:07

## 2025-06-17 RX ADMIN — METOPROLOL SUCCINATE 50 MG: 50 TABLET, EXTENDED RELEASE ORAL at 08:07

## 2025-06-17 RX ADMIN — HYDROCHLOROTHIAZIDE 25 MG: 25 TABLET ORAL at 08:07

## 2025-06-17 RX ADMIN — DAPAGLIFLOZIN 10 MG: 10 TABLET, FILM COATED ORAL at 08:07

## 2025-06-17 RX ADMIN — FUROSEMIDE 40 MG: 10 INJECTION, SOLUTION INTRAMUSCULAR; INTRAVENOUS at 09:33

## 2025-06-17 RX ADMIN — APIXABAN 5 MG: 5 TABLET, FILM COATED ORAL at 08:07

## 2025-06-17 RX ADMIN — POLYETHYLENE GLYCOL 3350 17 G: 17 POWDER, FOR SOLUTION ORAL at 08:07

## 2025-06-17 RX ADMIN — HYDROXYZINE HYDROCHLORIDE 25 MG: 25 TABLET, FILM COATED ORAL at 08:07

## 2025-06-17 SDOH — ECONOMIC STABILITY: FOOD INSECURITY: WITHIN THE PAST 12 MONTHS, THE FOOD YOU BOUGHT JUST DIDN'T LAST AND YOU DIDN'T HAVE MONEY TO GET MORE.: NEVER TRUE

## 2025-06-17 SDOH — ECONOMIC STABILITY: FOOD INSECURITY: WITHIN THE PAST 12 MONTHS, YOU WORRIED THAT YOUR FOOD WOULD RUN OUT BEFORE YOU GOT THE MONEY TO BUY MORE.: NEVER TRUE

## 2025-06-17 SDOH — HEALTH STABILITY: MENTAL HEALTH: HOW OFTEN DO YOU HAVE SIX OR MORE DRINKS ON ONE OCCASION?: NEVER

## 2025-06-17 SDOH — HEALTH STABILITY: MENTAL HEALTH: HOW MANY DRINKS CONTAINING ALCOHOL DO YOU HAVE ON A TYPICAL DAY WHEN YOU ARE DRINKING?: PATIENT DOES NOT DRINK

## 2025-06-17 SDOH — SOCIAL STABILITY: SOCIAL INSECURITY: WITHIN THE LAST YEAR, HAVE YOU BEEN HUMILIATED OR EMOTIONALLY ABUSED IN OTHER WAYS BY YOUR PARTNER OR EX-PARTNER?: NO

## 2025-06-17 SDOH — ECONOMIC STABILITY: HOUSING INSECURITY: AT ANY TIME IN THE PAST 12 MONTHS, WERE YOU HOMELESS OR LIVING IN A SHELTER (INCLUDING NOW)?: NO

## 2025-06-17 SDOH — HEALTH STABILITY: MENTAL HEALTH: HOW OFTEN DO YOU HAVE A DRINK CONTAINING ALCOHOL?: NEVER

## 2025-06-17 SDOH — ECONOMIC STABILITY: FOOD INSECURITY: HOW HARD IS IT FOR YOU TO PAY FOR THE VERY BASICS LIKE FOOD, HOUSING, MEDICAL CARE, AND HEATING?: NOT HARD AT ALL

## 2025-06-17 SDOH — ECONOMIC STABILITY: HOUSING INSECURITY: IN THE LAST 12 MONTHS, WAS THERE A TIME WHEN YOU WERE NOT ABLE TO PAY THE MORTGAGE OR RENT ON TIME?: NO

## 2025-06-17 SDOH — ECONOMIC STABILITY: INCOME INSECURITY: IN THE PAST 12 MONTHS HAS THE ELECTRIC, GAS, OIL, OR WATER COMPANY THREATENED TO SHUT OFF SERVICES IN YOUR HOME?: NO

## 2025-06-17 SDOH — SOCIAL STABILITY: SOCIAL INSECURITY: WITHIN THE LAST YEAR, HAVE YOU BEEN AFRAID OF YOUR PARTNER OR EX-PARTNER?: NO

## 2025-06-17 SDOH — ECONOMIC STABILITY: TRANSPORTATION INSECURITY: IN THE PAST 12 MONTHS, HAS LACK OF TRANSPORTATION KEPT YOU FROM MEDICAL APPOINTMENTS OR FROM GETTING MEDICATIONS?: NO

## 2025-06-17 SDOH — ECONOMIC STABILITY: HOUSING INSECURITY: IN THE PAST 12 MONTHS, HOW MANY TIMES HAVE YOU MOVED WHERE YOU WERE LIVING?: 0

## 2025-06-17 ASSESSMENT — ACTIVITIES OF DAILY LIVING (ADL)
LACK_OF_TRANSPORTATION: NO

## 2025-06-17 ASSESSMENT — LIFESTYLE VARIABLES
SKIP TO QUESTIONS 9-10: 1
AUDIT-C TOTAL SCORE: 0

## 2025-06-17 ASSESSMENT — PAIN - FUNCTIONAL ASSESSMENT: PAIN_FUNCTIONAL_ASSESSMENT: 0-10

## 2025-06-17 ASSESSMENT — PAIN SCALES - GENERAL: PAINLEVEL_OUTOF10: 0 - NO PAIN

## 2025-06-17 NOTE — PROGRESS NOTES
06/17/25 1714   Discharge Planning   Living Arrangements Children   Support Systems Children   Assistance Needed Cooking, cleaning, transportation   Type of Residence Private residence   Do you have animals or pets at home? No   Who is requesting discharge planning? Provider   Home or Post Acute Services In home services   Type of Home Care Services Home OT   Expected Discharge Disposition Home H  ([facility]   Does the patient need discharge transport arranged? No  (Son to provide)   Financial Resource Strain   How hard is it for you to pay for the very basics like food, housing, medical care, and heating? Not hard   Housing Stability   In the last 12 months, was there a time when you were not able to pay the mortgage or rent on time? N   In the past 12 months, how many times have you moved where you were living? 0   At any time in the past 12 months, were you homeless or living in a shelter (including now)? N   Transportation Needs   In the past 12 months, has lack of transportation kept you from medical appointments or from getting medications? no   In the past 12 months, has lack of transportation kept you from meetings, work, or from getting things needed for daily living? No     TCC ASSESSMENT:  Met with pt and introduced myself as care coordinator and member of the Care Transitions team for discharge planning. Assessment questions were answered by pt's son Chris. Pt lives upstairs in a dual family residence, son Chris and his brother lives downstairs. Chris stated when the weather is good pt will come downstairs and walk to the store, or sit on the porch, but not when the weather is bad due to arthritis. Pt can walk without use of an assistive device but has DME if needed. Son stated pt walks around the house, do light chores like water plants (struggle with dusting) and cook herself breakfast (he usually cooks dinner). Son denies any falls or home safety concerns from pt. Pt's address, phone  number, and emergency contact information was verified. Son denies any social or financial concerns at this time.    Home care: [facility] for PT/OT services. Son stated PT/OT showed pt how to do exercises, he believes this is the last week of services, and pt may have missed a week.   DME: Walker, shower chair, grab bars, glucometer + supplies.  : None.  PCP: Martha Melvin, THOMAS.  Last appt: 6/13/2025.    Transport to appts: [facility] provides.   Pharmacy: [facility]. Son voiced no issues affording or obtaining medications.     Discharge Planning: Pt presenting with c/o SOB, per attending she is off O2/NC and is medically ready for discharge, son is requesting home PT/OT services. Med Perez stated he would like to continue [facility] services which are scheduled to end at time of discharge, no other home going needs were voiced. [facility] intake confirmed receipt of home care referral for PT/OT and SOC 6/19-6/23, attending approved later SOC. Care coordinator will continue to follow for discharge planning needs.    Suzette Richards RN  Transitional Care Coordinator (TCC)  [phone] or [phone]

## 2025-06-17 NOTE — HH CARE COORDINATION
Home Care received a Referral for Physical Therapy and Occupational Therapy. We have processed the referral for a Start of Care on 6/19 - 6/23/25.     If you have any questions or concerns, please feel free to contact us at [phone]. Follow the prompts, enter your five digit zip code, and you will be directed to your care team on [facility].

## 2025-06-17 NOTE — CARE PLAN
Problem: Pain - Adult  Goal: Verbalizes/displays adequate comfort level or baseline comfort level  Outcome: Progressing     Problem: Safety - Adult  Goal: Free from fall injury  Outcome: Progressing     Problem: Chronic Conditions and Co-morbidities  Goal: Patient's chronic conditions and co-morbidity symptoms are monitored and maintained or improved  Outcome: Progressing     Problem: Respiratory  Goal: Minimize anxiety/maximize coping throughout shift  Flowsheets (Taken 6/16/2025 2345)  Minimize anxiety/maximize coping throughout shift: Monitor pain/anxiety level  Goal: Minimal/no exertional discomfort or dyspnea this shift  Flowsheets (Taken 6/16/2025 2345)  Minimal/no exertional discomfort or dyspnea this shift: Positioning to promote ventilation/comfort   The patient's goals for the shift include      The clinical goals for the shift include patients o2 saturation >90% at all times. No cardiac symptoms, HR will remain within normal ranges

## 2025-06-17 NOTE — CARE PLAN
The patient's goals for the shift include  not being short of breath    The clinical goals for the shift include pt will remain safe and free from injury throughout entire shift    Problem: Pain - Adult  Goal: Verbalizes/displays adequate comfort level or baseline comfort level  Outcome: Progressing     Problem: Safety - Adult  Goal: Free from fall injury  Outcome: Progressing     Problem: Discharge Planning  Goal: Discharge to home or other facility with appropriate resources  Outcome: Progressing     Problem: Chronic Conditions and Co-morbidities  Goal: Patient's chronic conditions and co-morbidity symptoms are monitored and maintained or improved  Outcome: Progressing     Problem: Nutrition  Goal: Nutrient intake appropriate for maintaining nutritional needs  Outcome: Progressing     Problem: Respiratory  Goal: Minimize anxiety/maximize coping throughout shift  Outcome: Progressing  Goal: Minimal/no exertional discomfort or dyspnea this shift  Outcome: Progressing  Goal: No signs of respiratory distress (eg. Use of accessory muscles. Peds grunting)  Outcome: Progressing  Goal: Verbalize decreased shortness of breath this shift  Outcome: Progressing

## 2025-06-17 NOTE — NURSING NOTE
Tuesday 06/17/25  Discharge     Patient was discharged from unit [id number] after admission for Hypoxia. Patient IV's and Telemetry was removed by bedside RN. Patient had family at bedside during AVS review by this VRN. Patient has no further questions or concerns. Patient has copy of AVS. Patient has all belonging in place in bag. Patient was transported off unit in safe and stable condition in wheelchair by  transport to family members vehicle.

## 2025-06-17 NOTE — NURSING NOTE
6/16/25 2200 (admitted from ED)  -patient alert and oriented x4  -pupils equal and reactive, face is symmetrical  -on room air, no complains of shortness of breath or difficulty breathing  -patient has bilateral hearing aids, per pt  was left at home  -patient denies chest pain, chest discomfort, dizziness or lightheadedness  -moderate and equal strength on extremities  -patient was able to ambulate from the stretcher to the bed, and bed to the bathroom. Standby assist she said she has a walker and a cane at home but she never used it,   -last BM 6/16 - pt reported  -continent of urine  -belongings: clothes, socks. Per pt, her purse and personal belongings were taken home by her son  -Virtual monitoring explained to patient and acknowledged    6/17/25 [id number]  -patient woke up and said she was so uncomfortable in her room and wanted to go home, asked RN to contact her son  -emotional at times   -patient knows she is in [facility] and can tell RN all the events that happened prior to arriving at ED  -patient refused anti anxiety meds and telemetry  -after speaking to her son over the phone, patient calmed down and relaxed. She said she was just not used to being in the hospital and away from home. She then agreed to put back the telemetry monitor.  -safety ensured at all times    6/17/25 0700 (bedside report and initial rounds with day shift RN)  -patient is awake and alert  -oriented x4 but sometimes forgetful  -patient intermittently needs o2 support PRN during exertion and using the bedside commode.   -no complains of chest pain, chest discomfort, dizziness or lightheadedness.  -patient is calm and comfortable in semi fowlers position  -no evident signs of distress during rounds  -patient remained safe, no falls or injury overnight

## 2025-06-18 ENCOUNTER — TELEPHONE (OUTPATIENT)
Dept: HOME HEALTH SERVICES | Facility: HOME HEALTH | Age: 89
End: 2025-06-18
Payer: COMMERCIAL

## 2025-06-18 LAB
Q ONSET: 225 MS
QRS COUNT: 19 BEATS
QRS DURATION: 76 MS
QT INTERVAL: 286 MS
QTC CALCULATION(BAZETT): 390 MS
QTC FREDERICIA: 352 MS
R AXIS: 55 DEGREES
T AXIS: 241 DEGREES
T OFFSET: 368 MS
VENTRICULAR RATE: 112 BPM

## 2025-06-18 NOTE — PROGRESS NOTES
Emergency Department Transition of Care Note       Signout   I received Gifty Crenshaw in signout from Dr. Mauro.  Please see the previous note for all HPI, PE and MDM up to the time of signout at 0700.    In brief Gifty Crenshaw is an 89 y.o. female presenting for   Chief Complaint   Patient presents with    Shortness of Breath           ED Course & Medical Decision Making   At the time of signout, the patient's disposition is pending discussion with CICU for admission.  This is a 89-year-old female who presents to the emergency department with chest pain and shortness of breath.  She was found to be in A-fib with RVR upon arrival.  Additionally was treated for shortness of breath with BiPAP.  While in the emergency department her blood pressures did drop after she was given metoprolol for the A-fib.  She was given small aliquots of fluid and digoxin was initiated for further management of atrial fibrillation.  She remained on BiPAP for period of time however was improving.  At the time of signout a VBG was obtained that showed no acid-base disturbance, improvement in CO2, and no lactate elevation.  Patient's respiratory mechanics appear comfortable and she was agreeable to trial off of BiPAP.  I did discuss with the CICU physician however at this time feel the patient may be appropriate for admission to a regular nursing floor.  CICU physician agrees based on labs, documentation, and description of current clinical status.  Patient was further able to tolerate trial off of BiPAP and at this time remained appropriate for admission to regular nursing floor.  She was given a dose of her home extended release metoprolol and her heart rate responded appropriately to this.  Blood pressures remained normal as well with systolic blood pressures between 110 and 125.  I discussed with the admissions coordinator who accepted the patient to medicine.  Patient overall remained stable and awaits transfer to regular nursing  floor.    ED Course:  ED Course as of 06/18/25 1154   Mon Jun 16, 2025   0437 Gifty Crenshaw [birth year] [AM]   0438 MRN: [id number] [AS]   0515 EKG interpreted by me: Atrial flutter with variable conduction.  Rate of 142.  Irregular rhythm.  QRS, QTc intervals within normal limits.  No ST elevations, depressions, or acute inversions. [AS]   0654 Repeat EKG interpreted by me: Tachycardic to 107, irregular rhythm.  QRS, QTc intervals within normal limits.  No ST elevations, depressions, or T wave inversions.  Atrial fibrillation with RVR versus atrial flutter with variable conduction seen. [AS]      ED Course User Index  [AM] Gigi Peterson MD  [AS] Juan Mauro MD         Diagnoses as of 06/18/25 1154   Hypoxia   Atrial fibrillation with rapid ventricular response (Multi)       Patient seen by and discussed with the attending emergency medicine physician.     Disposition   As a result of their workup, the patient will require admission to the hospital.  The patient was informed of her diagnosis.  The patient was given the opportunity to ask questions and I answered them. The patient agreed to be admitted to the hospital.    Procedures   Procedures    Patient seen and discussed with ED attending physician.    Zackary Tiwari DO  Emergency Medicine PGY-3  [facility]

## 2025-06-18 NOTE — DISCHARGE SUMMARY
Discharge Diagnosis  Hypoxia     Issues Requiring Follow-Up  Outpatient pulmonology follow up     Discharge Meds     Medication List      START taking these medications     diclofenac sodium 1 % gel; Commonly known as: Voltaren; Apply 4.5 inches   (4 g) topically 2 times a day as needed (ankle pain).     CONTINUE taking these medications     acetaminophen 500 mg tablet; Commonly known as: Tylenol   amLODIPine 10 mg tablet; Commonly known as: Norvasc   aspirin 81 mg chewable tablet   atorvastatin 40 mg tablet; Commonly known as: Lipitor   CHOLECALCIFEROL (VITAMIN D3) ORAL   cyclobenzaprine 5 mg tablet; Commonly known as: Flexeril   docusate sodium 50 mg capsule; Commonly known as: Colace   Eliquis 5 mg tablet; Generic drug: apixaban   Farxiga 10 mg tablet; Generic drug: dapagliflozin propanediol   furosemide 20 mg tablet; Commonly known as: Lasix   hydroCHLOROthiazide 25 mg tablet; Commonly known as: HYDRODiuril   hydrOXYzine HCL 25 mg tablet; Commonly known as: Atarax   levothyroxine 125 mcg tablet; Commonly known as: Synthroid, Levoxyl   metFORMIN 500 mg tablet; Commonly known as: Glucophage   metoprolol succinate XL 50 mg 24 hr tablet; Commonly known as: Toprol-XL   spironolactone 25 mg tablet; Commonly known as: Aldactone       Test Results Pending At Discharge  Pending Labs       No current pending labs.            Hospital Course   Gifty Crenshaw is a 89 y.o.female with PMH HFpEF, afib on eliquis brought in by EMS for shortness of breath. She states she has been intermittently short of breath since her lasix was changed from scheduled to as needed, but called EMS because the lasix did not help this morning. She denied any cough, fevers, or leg swelling. She takes her medications as scheduled. She said she does smoke 5-10 cigarettes daily, and has been doing this for about 70 years. She lives with her two sons who help her at home. She denied dizziness, lightheadedness, vision changes, headache, chest pain,  palpitations, abdominal pain, n/v, dysuria, hematuria, blood in bowels.      In the ED, ecg patient initially in AFib with RVR, requiring BiPAP. CBC showed elevated absolute lymphocytes and monocytes, but otherwise unremarkable. CMP should a creatinine of 1.25 (basline 1-1.2), and . She was given metop 5 mg IV, hydralazine 10 mg IV, digoxin 500 mcg, and lasix 40 mg IV. She was given mag sulfate and a dose of duonebs. ECG remained in Afib, however RVR resolved. She was also able to come off BiPAP and placed on 2-4 L NC.     Pertinent Physical Exam At Time of Discharge  Physical Exam  General:  Well developed. Alert. No acute distress.  Skin:  Warm, dry. normal skin turgor. no rashes. no lesions.   Head: NCAT  EENT: MMM  Cardiovascular:  Irregular rate and rhythm, normal S1 and S2, no gallops, no murmurs and no pericardial rub.  Pulmonary:  CTAB in all fields  Abdomen:  (+) BS. soft. non-tender. non-distended. no abdominal masses. no guarding or rigidity.  Neurologic:  grossly intact  Musculoskeletal: moves all extremities spontaneously  Psychiatric:  appropriate mood and affect    Outpatient Follow-Up  No future appointments.      Lizzeth Rodriguez MD

## 2025-06-18 NOTE — TELEPHONE ENCOUNTER
FYI patient was referred to [facility] for PT/OT upon hospital discharge however declined services when contacted for scheduling. Please ensure patient has a hospital follow up scheduled as they will not be monitored by home care.

## 2025-06-19 ENCOUNTER — PATIENT OUTREACH (OUTPATIENT)
Dept: PRIMARY CARE | Facility: CLINIC | Age: 89
End: 2025-06-19
Payer: COMMERCIAL

## 2025-06-19 NOTE — PROGRESS NOTES
Discharge Facility: [facility]  Discharge Diagnosis: hypoxia  Admission Date: 6/16/25  Discharge Date:  6/17/25    PCP Appointment Date: Pt Declined  Specialist Appointment Date: n/a  Hospital Encounter and Summary Linked: Yes  ED to Hosp-Admission (Discharged) with Lizzeth Rodirguez MD; Coby Gottlieb MD; Anuja Robertson MD (06/16/2025)   See discharge assessment below for further details     Wrap Up  Wrap Up Additional Comments: Successful transitions of care outreach to patient. Patient reports she is doing okay. States she is just confused and is not sure how to take care of her medical conditions and asking what she can do at home.  Educated on heart failure and atrial fibrillation.  She does check her weights every day. She has not been logging them. Explained importance of keeping a log of her weights and what to watch for/when to call her doctor. She verbalized understanding of this.  She had questions regarding anxiety effecting her breathing and education provided on deep breathing techniques to help with this.  Educated on avoiding high sodium foods. She does eat [place] every morning, but states no other high sodium foods and that she is not a big eater the rest of the day.  Suggested that if her weight increases or she continues to have trouble with the HF, she may need to cut out the [place] and she verbalized understanding of this.  Educated on CCM, which she expressed interested in. Referral made.  Patient states she was just seen by her PCP prior to admission and did not think she needed to come back in. Message sent to PCP/office for assistance with scheduling. Patient states her son typically handles her appts.  She is aware of my availability for any non-emergent questions or concerns. (6/19/2025  3:20 PM)    Engagement  Call Start Time: 1500 (6/19/2025  3:20 PM)    Medications  Medications reviewed with patient/caregiver?: Yes (6/19/2025  3:20 PM)  Is the patient having any side effects  they believe may be caused by any medication additions or changes?: No (6/19/2025  3:20 PM)  Does the patient have all medications ordered at discharge?: Yes (6/19/2025  3:20 PM)  Care Management Interventions: No intervention needed (6/19/2025  3:20 PM)  Prescription Comments: Start: diclofenac sodium (Voltaren) (6/19/2025  3:20 PM)  Is the patient taking all medications as directed (includes completed medication regime)?: Yes (6/19/2025  3:20 PM)  Care Management Interventions: Provided patient education (6/19/2025  3:20 PM)  Medication Comments: Reviewed new medication with patient and she had no questions. States she is taking all of her medications as prescribed. (6/19/2025  3:20 PM)    Appointments  Does the patient have a primary care provider?: Yes (6/19/2025  3:20 PM)  Care Management Interventions: Educated patient on importance of making appointment (6/19/2025  3:20 PM)  Has the patient kept scheduled appointments due by today?: Not applicable (6/19/2025  3:20 PM)    Self Management  What is the home health agency?: Pt declined [facility] (6/19/2025  3:20 PM)  Has home health visited the patient within 72 hours of discharge?: Not applicable (6/19/2025  3:20 PM)    Patient Teaching  Does the patient have access to their discharge instructions?: Yes (6/19/2025  3:20 PM)  Care Management Interventions: Reviewed instructions with patient (6/19/2025  3:20 PM)  What is the patient's perception of their health status since discharge?: Improving (6/19/2025  3:20 PM)  Is the patient/caregiver able to teach back the hierarchy of who to call/visit for symptoms/problems? PCP, Specialist, Home Health nurse, Urgent Care, ED, 911: Yes (6/19/2025  3:20 PM)

## 2025-06-20 ENCOUNTER — HOME CARE VISIT (OUTPATIENT)
Dept: HOME HEALTH SERVICES | Facility: HOME HEALTH | Age: OVER 89
End: 2025-06-20
Payer: COMMERCIAL

## 2025-06-20 VITALS — SYSTOLIC BLOOD PRESSURE: 130 MMHG | DIASTOLIC BLOOD PRESSURE: 80 MMHG | OXYGEN SATURATION: 96 % | HEART RATE: 55 BPM

## 2025-06-20 VITALS
OXYGEN SATURATION: 98 % | DIASTOLIC BLOOD PRESSURE: 80 MMHG | TEMPERATURE: 97.5 F | SYSTOLIC BLOOD PRESSURE: 130 MMHG | HEART RATE: 71 BPM | RESPIRATION RATE: 18 BRPM

## 2025-06-20 PROCEDURE — G0299 HHS/HOSPICE OF RN EA 15 MIN: HCPCS

## 2025-06-20 PROCEDURE — G0151 HHCP-SERV OF PT,EA 15 MIN: HCPCS

## 2025-06-20 ASSESSMENT — ENCOUNTER SYMPTOMS
DENIES PAIN: 1
LAST BOWEL MOVEMENT: 67376
APPETITE LEVEL: GOOD

## 2025-06-21 ENCOUNTER — CLINICAL SUPPORT (OUTPATIENT)
Dept: EMERGENCY MEDICINE | Facility: HOSPITAL | Age: OVER 89
End: 2025-06-21
Payer: COMMERCIAL

## 2025-06-21 ENCOUNTER — HOSPITAL ENCOUNTER (EMERGENCY)
Facility: HOSPITAL | Age: OVER 89
Discharge: HOME | End: 2025-06-21
Attending: STUDENT IN AN ORGANIZED HEALTH CARE EDUCATION/TRAINING PROGRAM
Payer: COMMERCIAL

## 2025-06-21 ENCOUNTER — APPOINTMENT (OUTPATIENT)
Dept: RADIOLOGY | Facility: HOSPITAL | Age: OVER 89
End: 2025-06-21
Payer: COMMERCIAL

## 2025-06-21 VITALS
WEIGHT: 162 LBS | OXYGEN SATURATION: 95 % | RESPIRATION RATE: 15 BRPM | DIASTOLIC BLOOD PRESSURE: 73 MMHG | BODY MASS INDEX: 26.03 KG/M2 | HEART RATE: 79 BPM | TEMPERATURE: 97.7 F | SYSTOLIC BLOOD PRESSURE: 109 MMHG | HEIGHT: 66 IN

## 2025-06-21 DIAGNOSIS — I48.91 ATRIAL FIBRILLATION AND FLUTTER: Primary | ICD-10-CM

## 2025-06-21 DIAGNOSIS — I48.92 ATRIAL FIBRILLATION AND FLUTTER: Primary | ICD-10-CM

## 2025-06-21 LAB
ALBUMIN SERPL BCP-MCNC: 4.3 G/DL (ref 3.4–5)
ALP SERPL-CCNC: 94 U/L (ref 33–136)
ALT SERPL W P-5'-P-CCNC: 9 U/L (ref 7–45)
ANION GAP SERPL CALC-SCNC: 13 MMOL/L (ref 10–20)
APTT PPP: 30 SECONDS (ref 26–36)
AST SERPL W P-5'-P-CCNC: 16 U/L (ref 9–39)
BASOPHILS # BLD AUTO: 0.02 X10*3/UL (ref 0–0.1)
BASOPHILS NFR BLD AUTO: 0.2 %
BILIRUB SERPL-MCNC: 0.4 MG/DL (ref 0–1.2)
BNP SERPL-MCNC: 94 PG/ML (ref 0–99)
BUN SERPL-MCNC: 26 MG/DL (ref 6–23)
CALCIUM SERPL-MCNC: 10 MG/DL (ref 8.6–10.6)
CARDIAC TROPONIN I PNL SERPL HS: 11 NG/L (ref 0–34)
CARDIAC TROPONIN I PNL SERPL HS: 6 NG/L (ref 0–34)
CHLORIDE SERPL-SCNC: 103 MMOL/L (ref 98–107)
CO2 SERPL-SCNC: 24 MMOL/L (ref 21–32)
CREAT SERPL-MCNC: 1.59 MG/DL (ref 0.5–1.05)
EGFRCR SERPLBLD CKD-EPI 2021: 31 ML/MIN/1.73M*2
EOSINOPHIL # BLD AUTO: 0.04 X10*3/UL (ref 0–0.4)
EOSINOPHIL NFR BLD AUTO: 0.4 %
ERYTHROCYTE [DISTWIDTH] IN BLOOD BY AUTOMATED COUNT: 16 % (ref 11.5–14.5)
GLUCOSE SERPL-MCNC: 171 MG/DL (ref 74–99)
HCT VFR BLD AUTO: 42.4 % (ref 36–46)
HGB BLD-MCNC: 13.7 G/DL (ref 12–16)
IMM GRANULOCYTES # BLD AUTO: 0.03 X10*3/UL (ref 0–0.5)
IMM GRANULOCYTES NFR BLD AUTO: 0.3 % (ref 0–0.9)
INR PPP: 1.2 (ref 0.9–1.1)
LYMPHOCYTES # BLD AUTO: 3.73 X10*3/UL (ref 0.8–3)
LYMPHOCYTES NFR BLD AUTO: 37 %
MCH RBC QN AUTO: 27.1 PG (ref 26–34)
MCHC RBC AUTO-ENTMCNC: 32.3 G/DL (ref 32–36)
MCV RBC AUTO: 84 FL (ref 80–100)
MONOCYTES # BLD AUTO: 0.87 X10*3/UL (ref 0.05–0.8)
MONOCYTES NFR BLD AUTO: 8.6 %
NEUTROPHILS # BLD AUTO: 5.38 X10*3/UL (ref 1.6–5.5)
NEUTROPHILS NFR BLD AUTO: 53.5 %
NRBC BLD-RTO: 0 /100 WBCS (ref 0–0)
PLATELET # BLD AUTO: 207 X10*3/UL (ref 150–450)
POTASSIUM SERPL-SCNC: 4.3 MMOL/L (ref 3.5–5.3)
PROT SERPL-MCNC: 8.3 G/DL (ref 6.4–8.2)
PROTHROMBIN TIME: 13.3 SECONDS (ref 9.8–12.4)
RBC # BLD AUTO: 5.06 X10*6/UL (ref 4–5.2)
SODIUM SERPL-SCNC: 136 MMOL/L (ref 136–145)
T4 FREE SERPL-MCNC: 1.36 NG/DL (ref 0.78–1.48)
TSH SERPL-ACNC: 11.01 MIU/L (ref 0.44–3.98)
WBC # BLD AUTO: 10.1 X10*3/UL (ref 4.4–11.3)

## 2025-06-21 PROCEDURE — 71046 X-RAY EXAM CHEST 2 VIEWS: CPT

## 2025-06-21 PROCEDURE — 84439 ASSAY OF FREE THYROXINE: CPT

## 2025-06-21 PROCEDURE — 84484 ASSAY OF TROPONIN QUANT: CPT

## 2025-06-21 PROCEDURE — 80053 COMPREHEN METABOLIC PANEL: CPT | Performed by: STUDENT IN AN ORGANIZED HEALTH CARE EDUCATION/TRAINING PROGRAM

## 2025-06-21 PROCEDURE — 99285 EMERGENCY DEPT VISIT HI MDM: CPT | Mod: 25 | Performed by: STUDENT IN AN ORGANIZED HEALTH CARE EDUCATION/TRAINING PROGRAM

## 2025-06-21 PROCEDURE — 93005 ELECTROCARDIOGRAM TRACING: CPT

## 2025-06-21 PROCEDURE — 36415 COLL VENOUS BLD VENIPUNCTURE: CPT | Performed by: STUDENT IN AN ORGANIZED HEALTH CARE EDUCATION/TRAINING PROGRAM

## 2025-06-21 PROCEDURE — 83880 ASSAY OF NATRIURETIC PEPTIDE: CPT

## 2025-06-21 PROCEDURE — 36415 COLL VENOUS BLD VENIPUNCTURE: CPT

## 2025-06-21 PROCEDURE — 71046 X-RAY EXAM CHEST 2 VIEWS: CPT | Performed by: RADIOLOGY

## 2025-06-21 PROCEDURE — 85025 COMPLETE CBC W/AUTO DIFF WBC: CPT | Performed by: STUDENT IN AN ORGANIZED HEALTH CARE EDUCATION/TRAINING PROGRAM

## 2025-06-21 PROCEDURE — 85730 THROMBOPLASTIN TIME PARTIAL: CPT | Performed by: STUDENT IN AN ORGANIZED HEALTH CARE EDUCATION/TRAINING PROGRAM

## 2025-06-21 PROCEDURE — 84443 ASSAY THYROID STIM HORMONE: CPT

## 2025-06-21 ASSESSMENT — PAIN - FUNCTIONAL ASSESSMENT: PAIN_FUNCTIONAL_ASSESSMENT: 0-10

## 2025-06-21 ASSESSMENT — PAIN SCALES - GENERAL: PAINLEVEL_OUTOF10: 0 - NO PAIN

## 2025-06-22 LAB
ATRIAL RATE: 315 BPM
ATRIAL RATE: 320 BPM
P AXIS: 52 DEGREES
P OFFSET: 150 MS
P ONSET: 96 MS
Q ONSET: 224 MS
Q ONSET: 228 MS
QRS COUNT: 13 BEATS
QRS COUNT: 22 BEATS
QRS DURATION: 66 MS
QRS DURATION: 74 MS
QT INTERVAL: 324 MS
QT INTERVAL: 408 MS
QTC CALCULATION(BAZETT): 470 MS
QTC CALCULATION(BAZETT): 480 MS
QTC FREDERICIA: 421 MS
QTC FREDERICIA: 449 MS
R AXIS: -7 DEGREES
R AXIS: 13 DEGREES
T AXIS: -57 DEGREES
T AXIS: 162 DEGREES
T OFFSET: 386 MS
T OFFSET: 432 MS
VENTRICULAR RATE: 132 BPM
VENTRICULAR RATE: 80 BPM

## 2025-06-22 NOTE — DISCHARGE INSTRUCTIONS
You are seen in the emergency department for evaluation of elevated heart rate.  While you were here in the emergency department your heart rate did decrease back to normal. Continue taking your home medications as prescribed. Follow up with your primary care doctor. Let them know you were seen in the ED. You should return if you develop shortness of breath which does not go away after taking your prescribed medications.  You should also return if you develop chest pain or persistent palpitations.  On her labs you did have an elevated TSH, which means that your thyroid medication may need to be adjusted.  Please let your primary care doctor know that we did these labs as they may want to change your dosing.

## 2025-06-22 NOTE — PROGRESS NOTES
.Primary Care Physician: Alexandra Ambrosio, ANTOENTTE-CNP  Patient's Location: [place] [id number]    Date of Visit: 07/01/2025 11:00 AM EDT  Location of visit: [facility]   Type of Visit: Heart Failure New   Last visit: Visit date not found    DR. Carl Gillombardo: Cardiology    HPI / Summary:   Елена Perez is a very pleasant 89 y.o. female from [place], [place].  Елена Perez lives with her son.  She is accompanied by: (who provides additional history) son.    Елена Perez presenting post hospitalization for new acute diastolic heart failure follow up and optimization of GDMT medications. On 5/14/2025 she presented to the ED with worsening shortness of breath x 2 days. Ran out of her metoprolol 1 week ago but compliant with the rest of her medications. Increased -170s SVT on EKG, B/P 156/111 with JVD and bilateral rales. Needed BiPAP and IV lasix. Chest xray: moderate right and small to moderate left pleural effusions with diffuse interstitial lung markings suggestive of pulmonary edema.  and normal Troponin. No ischemic work-up pursued, given negative signs of ACS; HS trop negative x2, may consider on an OP basis for completion given new/unclear etiology of HFpEF (likely tachy-mediated). Suspect Afib RVR 2/2 med non-compliance; reports running out of home metoprolol for 1 week, but is a poor historian & has NOT been taking her aspirin d/t pill burden. PT/OT with MOCA score 13/30 indicated moderate cognitive impairment, however, patient limited by hearing difficulties and short attention span. Son is involved in mother's care. Prior to discharge transitioned to oral lasix 20mg daily, started Farxiga 10 mg, and spironolactone 25 mg daily for GDMT.  Discharge weight: 75.7kg     She was also admitted to [facility] on 4/20/2025 for CHF exacerbation with LVEF 60-65% with normal RVSP, no diastolic dysfunction and only mild left atrial dilation.     At our last apt on 5/20/2025, she endorsed  lightheadedness after taking her morning medication, extreme fatigue throughout the day especially after taking her medication.  She also had not taken her medication prior to our appointment. Her physical exam was unremarkable for exception of being tachycardic with heart rate in the 130s and irregular.  I sent her to the ED secondary to her EKG that I did in office that revealed A-fib RVR 130s to 140s and a manual blood pressure of 93/62.  In the ED on 5/20/2025, she continued to be A-fib RVR, and chest x-ray: unremarkable. They gave patient her home dose metoprolol 50 mg which lowered her heart rate to 80s and discharged patient.    At our last apt on 5/30/2025: she  had no complaints and her home B/P 110-118/70's. Physical exam showed irregular rhythm and secondary to being euvolemic, I changed her furosemide 20 mg daily to PRN.      Today:     She denies CP, palpitations, increase heart rate or skipped beat. Denies LUZ,  or difficulty ambulating long distances.  Denies SOB while changing, showering or bendopnea. Denies orthopnea/PND, cough, and sleeps on her side in bed with 1 pillow. Denies lower leg edema, abdominal tightness, feeling bloated or loss of appetite. Denies lightheadedness.  Denies dizziness, syncope, or recent falls. Medication adherent. Eating/drinking well. Denies NVD. Denies any nose bleed or hematochezia, on ASA and Eliquis. Home B/P 110-118's over 60. Home scale and weight stable at 165 lbs.    Hospitalizations:   5/14/2025-5/16/2025: acute on chronic heart failure exacerbation  4/20-4/21/2025: Observation- SOB sats 86%- DX w/ New onset of HF  01/29/2025-01/30/2025: New Afib (started on metoprolol and Eliquis)    Objective   Medical History (non cardiac):   # Anxiety  # hypothyroidism  # PAD w/ stable L SFA occlusion (incidental finding, ASA and lipitor was started)  # Hypertension: Last BP: 114/73.  # Hyperlipidemia: Last Tchol 5/16/2025: 174 / LDL  119 / HDL 38.4 / TRIG 84  # Type II  "Diabetes Mellitus: Last A1c 4/20/2025: 6.7.   # CKD3: Last BUN/Cr (GFR): 6/21/2025: 26/1.59 (31)  #    Cardiovascular History   # New DX Atrial Fibrillation on 1/24/2025 w/ elevated [id number]  # Non-ischemic Cardiomyopathy: suspect tachyarrhythmia mediated  # HFrEF /chronic diastolic Heart Failure, last EF  4/21/2025: 63%, dx'd 4/2025, ACC/AHA Stage C, NYHA II,     Surgical Hx:     Social Hx:  # She still smokes 2 packs of cigarette per week  # Denies ETOH or illicit drug use    Family Hx:   # Sister: liver CA  # Sister: Pancreatic Cancer  # Mother: Parkinsonism    Allergies:   RX Allergies[1]    Exam:       7/1/2025    11:01 AM 6/27/2025    12:02 PM 6/21/2025     8:55 PM 6/21/2025     8:26 PM 6/21/2025     6:57 PM 6/20/2025    11:53 AM   Vitals   Systolic 114 102 109 92 118    Diastolic 73 67 73 63 65    BP Location Left arm        Heart Rate 64 94 79 84 126 55   Temp     36.5 °C (97.7 °F)    Resp   15  16    Height 1.676 m (5' 6\")    1.676 m (5' 6\")    Weight (lb) 169    162    BMI 27.28 kg/m2    26.15 kg/m2    BSA (m2) 1.89 m2    1.85 m2    Visit Report Report          Wt Readings from Last 5 Encounters:   07/01/25 76.7 kg (169 lb)   06/21/25 73.5 kg (162 lb)   06/13/25 76.3 kg (168 lb 3.2 oz)   05/30/25 75.7 kg (166 lb 12.8 oz)   05/23/25 74.4 kg (164 lb)     GEN: Pleasant, well-appearing, no acute distress,   HEENT: JVP not elevated, no icterus. Moist mucus membranes, No HJR, no thyromegaly  CHEST: No wheeze, good air movement bilaterally.  CV: normal, regular rhythm. Normal S1, S2, NO S3/4, no m/r/g  ABD: Soft, ND, NT and normal active bowel sounds  EXT: Warm, well perfused, No LE edema, distal pulses are 2+ in upper extremities  SKIN: Dry with no rash present  NEURO: Oriented and alert x3, mood and affect appropriate and is oriented to plan    Labs:   CMP:  Recent Labs     06/21/25  1909 05/20/25  1314 05/15/25  1923 05/15/25  0735 05/14/25  1619    136 136 137 136   K 4.3 4.9 3.8 4.1 3.9    " "97* 100 104 102   CO2 24 31 26 26 22   ANIONGAP 13 13 14 11 16   BUN 26* 29* 22 18 16   CREATININE 1.59* 1.55* 1.38* 1.05 1.09*   EGFR 31* 32* 37* 51* 49*   MG  --  2.82* 2.28 2.21  --      Recent Labs     06/21/25 1909 05/20/25  1314 05/15/25  1923 05/15/25  0735   ALBUMIN 4.3 4.3 4.1 3.7   ALKPHOS 94 85  --  69   ALT 9 11  --  8   AST 16 20  --  11   BILITOT 0.4 0.6  --  0.6   CBC:  Recent Labs     06/21/25 1909 05/20/25 1314 05/15/25  1923 05/15/25  0735 05/14/25  1619   WBC 10.1 7.9 9.7 8.3 13.4*   HGB 13.7 14.1 11.7* 11.6* 13.2   HCT 42.4 41.2 36.8 37.8 38.6    248 281 256 269   MCV 84 79* 87 88 82   HEME/ENDO:  Recent Labs     06/21/25  1909 05/15/25  0735 04/20/25  0403 01/30/25  0008 07/24/24  0922   TSH 11.01* 6.65*  --    < > 4.10*   FREET4 1.36 1.35  --   --  1.42   HGBA1C  --   --  6.7*  --  6.7*    < > = values in this interval not displayed.    CARDIAC:   Recent Labs     06/21/25 2203 06/21/25 1909 05/20/25  1314 05/14/25  1619 04/20/25  0548 04/20/25  0403   TROPHS 6 11 11 6 7 6   BNP  --  94 155* 509*  --  184*     Recent Labs     05/16/25  0816 07/24/24  0922   CHOL 174 206*   LDLCALC 119* 144*   HDL 38.4 39.4   TRIG 84 113   MICRO: No results for input(s): \"ESR\", \"CRP\", \"PROCAL\" in the last 8760 hours.No results found for the last 90 days.      Notable Studies, reviewed:   EKG:   Recent Labs     06/21/25 2112 06/21/25  1903 05/22/25  1615   VENTRATE 132 80 105   ATRRATE 315 320 91   QTCFRED 421 449 421   QRSDUR 66 74 78   QTCCALCB 480 470 462     Encounter Date: 06/21/25   ECG 12 lead   Result Value    Ventricular Rate 132    Atrial Rate 315    QRS Duration 66    QT Interval 324    QTC Calculation(Bazett) 480    R Axis 13    T Axis 162    QRS Count 22    Q Onset 224    T Offset 386    QTC Fredericia 421    Narrative    Atrial flutter with variable AV block  Nonspecific ST and T wave abnormality  Abnormal ECG  When compared with ECG of 20-MAY-2025 12:42,  Atrial flutter has replaced " Atrial fibrillation  Criteria for Septal infarct are no longer Present      See ED provider note for full interpretation and clinical correlation  Confirmed by Emili Velásquez ([id number]) on 6/22/2025 12:28:59 AM     Echocardiogram:   Recent Labs     04/21/25  1032   EF 63   LVIDD 4.90   RV 29.8     Transthoracic Echo (TTE) Complete With Contrast And Ultromics 04/21/2025  CONCLUSIONS:  1. Poorly visualized anatomical structures due to suboptimal image quality.  2. Left ventricular ejection fraction is normal, by visual estimate at 60-65%.  3. There is normal right ventricular global systolic function.  4. The left atrium is mildly dilated.  5. Right ventricular systolic pressure is within normal limits.    Coronary Angiography: No results found for this or any previous visit from the past 1800 days.    Right Heart Cath: No results found for this or any previous visit from the past 1800 days.    Cardiac Scoring: No results found for this or any previous visit from the past 1800 days.    Cardiac MRI: No results found for this or any previous visit from the past 1800 days.    Nuclear:No results found for this or any previous visit from the past 1800 days.    Metabolic Stress: No results found for this or any previous visit from the past 1800 days.      Current Outpatient Medications   Medication Instructions    acetaminophen (TYLENOL) 325-650 mg, Every 4 hours PRN    apixaban (ELIQUIS) 5 mg, oral, 2 times daily    atorvastatin (LIPITOR) 40 mg, oral, Nightly    Blood glucose monitoring meter Use to check blood sugar daily.    blood sugar diagnostic (Blood Glucose Test) Use to check blood sugar daily.    cholecalciferol (Vitamin D-3) 5,000 Units tablet 1 tablet, Daily    dapagliflozin propanediol (FARXIGA) 10 mg, oral, Daily    docusate sodium (COLACE) 100 mg, oral, 2 times daily    fluticasone (Flonase) 50 mcg/actuation nasal spray 1 spray, Daily    furosemide (LASIX) 20 mg, oral, As needed    hydrOXYzine HCL (ATARAX) 25 mg,  oral, Every 6 hours PRN    levothyroxine (SYNTHROID, LEVOXYL) 62.5 mcg, oral, Daily, Take on an empty stomach at the same time each day, either 30 to 60 minutes prior to breakfast    metoprolol succinate XL (TOPROL-XL) 50 mg, oral, Daily, Do not crush or chew.    spironolactone (ALDACTONE) 25 mg, oral, Daily      Notable Therapies: *GDMT*   Class  Agent SAFETY    *ARNI* / ACE / ARB   Last BP: 114/73, Last BUN/Cr (GFR): 6/21/2025: 26/1.59 (31)    *Beta-Blocker*  Metoprolol succinate 50 mg Daily Last HR: 64    *MRA*  Spironolactone 25 mg daily Last K: 6/21/2025: 4.3     *SGLT2*  Farxiga 10 mg daily Last A1c 4/20/2025: 6.7     Diuretic  Furosemide 20 mg PRN Last BNP: 6/21/2025: 94    Hydralazine/ISDN       Digoxin  Last Digoxin level: No results found for requested labs within last 3650 days.    Anticoagulation  Eliquis 5 mg BID Last Hgb: 6/21/2025: 13.7    Anti-arrhythmic   Last QTc: 6/21/2025: 480    Antiplatelet  ASA 81 mg daily Last Platelet: 6/21/2025: 207    Lipid-Lowering  Lipitor 40 mg nightly Last Tchol 5/16/2025: 174 / LDL 9/8/2023: 138 or 5/16/2025: 119 / HDL 5/16/2025: 38.4 / TRIG 5/16/2025: 84    Other        Device(s)   EF: 4/21/2025: 63%, QRS: 6/21/2025: 66ms    Cardiac Rehab,   if EF <35/MI/OHS        HFpEF score table([id number])    IMPRESSION/PLAN:    Елена Perez is a 89 y.o. female with PMH of HTN, HLD, Afib, Type 2 DM, PAD with stable L SFA occlusion, hypothyroidism, and newly diagnosed HFpEF (4/2025) who presented to the ED with shortness of breath. She was admitted for ADHF 2/2 afib RVR under HHVI services. At the current times she has functional class  I-II symptoms and is euvolemic and warm on exam. She feels great.     1) HFpEF /Chronic diastolic Heart Failure, NICM, last LVEF 60-65% (4/2025), Stage C, NYHA II, -suspect tachy-mediated NICM (diagnosed w/afib in 1/2025), admit HS Trop negative x 2, no prior ischemic evaluation  -TTE 4/202/25 -in afib: EF 60-65%, no WMAs, negative bubble  study   -c/w farxiga 10 mg daily  -c/w spironolactone 25 mg daily--needed to reorder (fell off the med list)  -c/w furosemide 20 mg daily/PRN-used it twice since I saw her 1 month ago- for SOB  - ordered labs to have drawn in the next few days to monitor her kidney function- might need to make changes to her spironolactone if GFR worsen.    2) A-fib, elevated [id number] 1/2025  -c/w Metoprolol succinate 50 mg daily  -c/w Eliquis 5mg BID    3) Hypertension, well-controlled  See number 1    4) PAD/hyperlipidemia/elevated CAD risk  -c/w aspirin 81 mg daily  -c/w lipitor 40 mg nightly    Follow up with Dr. Galloway on August 26    MDM: 4  2 stable Chronic illnesses  Review of prior external notes from each unique source  Prescription drug management    Orders:  No orders of the defined types were placed in this encounter.       Followup Appts:  Future Appointments   Date Time Provider Department Center   7/21/2025  9:30 AM JAYA South [id number] [facility]   8/26/2025  1:00 PM Rich Galloway MD [id number] [facility]   12/12/2025 10:20 AM JAYA Catherine [id number] [facility]           ____________________________________________________________    JAYA Choudhary  Outpatient Heart Failure clinic  Division of Cardiovascular Medicine  [facility]         [1]   Allergies  Allergen Reactions    Lisinopril Swelling     Patient son stated that after taking this medication patient feet became swollen.

## 2025-06-22 NOTE — ED PROVIDER NOTES
History of Present Illness     History provided by: Patient and Family Member  Limitations to History: None  External Records Reviewed with Brief Summary: Outpatient progress note from 6/13/25 at [facility] which showed patient evaluated for establishing of PCP.     HPI:  Елена Perez is a 89 y.o. female presenting to the ED for evaluation of rapid heart rate which was noticed at home while taking her blood pressure earlier today.  Patient endorses that she was short of breath 2 days ago and took her prescribed Lasix at that time.  She also did take a dose of the Lasix today as she did not want to get short of breath again.  Patient was recently hospitalized for shortness of breath and increased fluid, found to also have constipation and was started on a bowel regimen for home use due to abdominal pain.  Patient states her abdominal pain has solved since starting the medications and she is having normal bowel movements.  Was not having any palpitations or chest pain or shortness of breath today.  Members were just concerned because her heart rate in the past has been elevated when she needed to come in for further diuresis.    Physical Exam   Triage vitals:  T 36.5 °C (97.7 °F)  HR (!) 126  /65  RR 16  O2 96 % None (Room air)    General: Awake, alert, in no acute distress  Eyes: Gaze conjugate.  No scleral icterus or injection  HENT: Normo-cephalic, atraumatic. No stridor  CV: Regular rate, regular rhythm. Radial pulses 2+ bilaterally  Resp: Breathing non-labored, speaking in full sentences.  Clear to auscultation bilaterally rhonchi or wheeze.  GI: Soft, non-distended, non-tender. No rebound or guarding.  MSK/Extremities: No gross bony deformities. Moving all extremities  Skin: Warm. Appropriate color  Neuro: Alert. Oriented. Face symmetric. Speech is fluent.  Gross strength and sensation intact in b/l UE and Les. Mild lower extremity edema which is nonpitting.   Psych: Appropriate mood and  "affect    Medical Decision Making & ED Course   Medical Decision Makin y.o. female to ED for evaluation of rapid heart rate.  On arrival patient was noted to be in A-fib with RVR to the 120s to 130s.  Upon arrival to room and at the time of provider assessment, patient now  ----  {Scoring Tools Utilized:46574}    Differential diagnoses considered include but are not limited to: ***     Social Determinants of Health which Significantly Impact Care: {Social Determinants of Health which Significantly Impact Care:53905::\"None identified\"} {The following actions were taken to address these social determinants:75798}    EKG Independent Interpretation: The EKG obtained at *** was independently interpreted by myself. It demonstrates *** rhythm with a ventricular rate of ***. *** axis. Intervals ***. ST segments showed ***. *** compared to prior EKG from ***  Additional EKGs: ***    Independent Result Review and Interpretation: {Independent Result Review and Interpretation:34953::\"Relevant laboratory and radiographic results were reviewed and independently interpreted by myself.  As necessary, they are commented on in the ED Course.\"}    Chronic conditions affecting the patient's care: {Chronic conditions affecting the patient's care:01487::\"As documented above in MDM\"}    The patient was discussed with the following consultants/services: {The patient was discussed with the following consultants/services:70971::\"None\"}    Care Considerations: {Care Considerations:98502::\"As documented above in MDM\"}    ED Course:     Disposition   {ED Disposition:75029}    Procedures   Procedures    Patient seen and discussed with ED attending physician.    Jen Merida, DO  Emergency Medicine  " Interpretation: Relevant laboratory and radiographic results were reviewed and independently interpreted by myself.  As necessary, they are commented on in the ED Course.    Chronic conditions affecting the patient's care: As documented above in [facility]    The patient was discussed with the following consultants/services: None    Care Considerations: As documented above in [facility]    ED Course:  ED Course as of 06/23/25 2116   Sat Jun 21, 2025   2100 Attending summary:  89 y/o F with PMHx HTN, HLD, DM, CKD, CHF, afib on eliquis who is presenting for tachycardia.  She checked her BP at home, noted her heart rate was in the 150s, and came into the ED.  2 days ago had shortness of breath, started to take her Lasix which she takes as needed, and shortness of breath resolved.  No chest pain, palpitations, orthopnea, leg pain or swelling, weight gain.  At baseline state of health currently.  No fevers, chills, cough or recent illness.  In triage, and on initial EKG, A-fib RVR to 120s to 130s.  By time of rooming on my evaluation, she is in aflutter with controlled rate and vitals are otherwise unremarkable.  Exam shows very well-appearing elderly female who is alert and oriented x 4 in no respiratory distress with clear lungs, no lower extremity tenderness or edema.    EKG has no signs of acute ischemia. Had basic labs in triage which showed no leukocytosis, normal hemoglobin, and CMP at her baseline.  Added on troponins which were negative x 2, TSH which is elevated but normal free T4, and BNP which is normal.  Chest x-ray shows no pulmonary edema.  Her RVR has resolved and she has no clear etiology.  It is possible that it was a mild CHF exacerbation that she has been treating appropriately at home.  Will discharge home with instructions for close PCP follow-up and to message cardiology for sooner follow-up appointment.  Patient and family understand and agreed with plan. [SS]      ED Course User Index  [SS] Neida Thurston  MD         Diagnoses as of 06/23/25 2116   Atrial fibrillation and flutter     Disposition   As a result of the work-up, the patient was discharged home.  she was informed of her diagnosis and instructed to come back with any concerns or worsening of condition.  she and was agreeable to the plan as discussed above.  she was given the opportunity to ask questions.  All of the patient's questions were answered.    Procedures   Procedures    Patient seen and discussed with ED attending physician.    Jen Merida DO  Emergency Medicine     Jen Merida DO  Resident  06/23/25 2137

## 2025-06-23 ENCOUNTER — DOCUMENTATION (OUTPATIENT)
Dept: PRIMARY CARE | Facility: CLINIC | Age: 89
End: 2025-06-23
Payer: COMMERCIAL

## 2025-06-23 ENCOUNTER — PATIENT OUTREACH (OUTPATIENT)
Dept: PRIMARY CARE | Facility: CLINIC | Age: 89
End: 2025-06-23
Payer: COMMERCIAL

## 2025-06-23 NOTE — PROGRESS NOTES
Care Management Monthly Outreach  Chart review completed    Last Office Visit: Visit date not found  Next Office Visit: Visit date not found   APC: n/a  Outreach attempted: left message on voicemail requesting return call..    Tabitha Smith

## 2025-06-24 ENCOUNTER — PATIENT OUTREACH (OUTPATIENT)
Dept: PRIMARY CARE | Facility: CLINIC | Age: 89
End: 2025-06-24
Payer: COMMERCIAL

## 2025-06-24 NOTE — PROGRESS NOTES
Call made. Son answered the phone and inquired about the purpose of the call. RN CM discussed Chronic Care Management Services with son and that Gifty verbalized interest to TCM during TCM outreach.     Son stated that Gifty is not available at this time.

## 2025-06-26 ENCOUNTER — TELEPHONE (OUTPATIENT)
Dept: CARDIOLOGY | Facility: HOSPITAL | Age: OVER 89
End: 2025-06-26
Payer: COMMERCIAL

## 2025-06-26 ENCOUNTER — PATIENT OUTREACH (OUTPATIENT)
Dept: PRIMARY CARE | Facility: CLINIC | Age: 89
End: 2025-06-26
Payer: COMMERCIAL

## 2025-06-26 NOTE — PROGRESS NOTES
Patient identified for CCM program. Multiple unsuccessful attempts were made to reach patient. Therefore, RN CM will close identification. Voicemail left with contact information for patient to call back with any non-emergent questions or concerns.  Identification for CCM closed

## 2025-06-27 ENCOUNTER — HOME CARE VISIT (OUTPATIENT)
Dept: HOME HEALTH SERVICES | Facility: HOME HEALTH | Age: OVER 89
End: 2025-06-27
Payer: COMMERCIAL

## 2025-06-27 VITALS — DIASTOLIC BLOOD PRESSURE: 67 MMHG | SYSTOLIC BLOOD PRESSURE: 102 MMHG | HEART RATE: 94 BPM

## 2025-06-27 PROCEDURE — G0151 HHCP-SERV OF PT,EA 15 MIN: HCPCS

## 2025-06-27 SDOH — HEALTH STABILITY: PHYSICAL HEALTH: EXERCISE COMMENTS: THIS THERAPIST WAS UNABLE TO GIVE PATIENT HEP AS PATIENT DID NOT AGREE TO PERFORMING IT

## 2025-06-27 ASSESSMENT — ENCOUNTER SYMPTOMS
DENIES PAIN: 1
LOWEST PAIN SEVERITY IN PAST 24 HOURS: 0/10
PERSON REPORTING PAIN: PATIENT
HIGHEST PAIN SEVERITY IN PAST 24 HOURS: 0/10

## 2025-06-27 ASSESSMENT — ACTIVITIES OF DAILY LIVING (ADL)
OASIS_M1830: 04
HOME_HEALTH_OASIS: 01

## 2025-06-30 DIAGNOSIS — I50.9 HEART FAILURE, UNSPECIFIED HF CHRONICITY, UNSPECIFIED HEART FAILURE TYPE: ICD-10-CM

## 2025-07-01 ENCOUNTER — OFFICE VISIT (OUTPATIENT)
Dept: CARDIOLOGY | Facility: HOSPITAL | Age: OVER 89
End: 2025-07-01
Payer: COMMERCIAL

## 2025-07-01 VITALS
HEIGHT: 66 IN | OXYGEN SATURATION: 98 % | BODY MASS INDEX: 27.16 KG/M2 | SYSTOLIC BLOOD PRESSURE: 114 MMHG | HEART RATE: 64 BPM | WEIGHT: 169 LBS | DIASTOLIC BLOOD PRESSURE: 73 MMHG

## 2025-07-01 DIAGNOSIS — I50.31 ACUTE DIASTOLIC HEART FAILURE: ICD-10-CM

## 2025-07-01 DIAGNOSIS — I50.9 HEART FAILURE, UNSPECIFIED HF CHRONICITY, UNSPECIFIED HEART FAILURE TYPE: Primary | ICD-10-CM

## 2025-07-01 DIAGNOSIS — I48.91 NEW ONSET A-FIB (MULTI): ICD-10-CM

## 2025-07-01 PROCEDURE — 1159F MED LIST DOCD IN RCRD: CPT

## 2025-07-01 PROCEDURE — 99212 OFFICE O/P EST SF 10 MIN: CPT

## 2025-07-01 PROCEDURE — 3074F SYST BP LT 130 MM HG: CPT

## 2025-07-01 PROCEDURE — 99214 OFFICE O/P EST MOD 30 MIN: CPT

## 2025-07-01 PROCEDURE — RXMED WILLOW AMBULATORY MEDICATION CHARGE

## 2025-07-01 PROCEDURE — 3078F DIAST BP <80 MM HG: CPT

## 2025-07-01 PROCEDURE — 1126F AMNT PAIN NOTED NONE PRSNT: CPT

## 2025-07-01 RX ORDER — SPIRONOLACTONE 25 MG/1
25 TABLET ORAL DAILY
Qty: 90 TABLET | Refills: 3 | Status: SHIPPED | OUTPATIENT
Start: 2025-07-01 | End: 2026-07-01

## 2025-07-01 RX ORDER — HYDROXYZINE HYDROCHLORIDE 25 MG/1
25 TABLET, FILM COATED ORAL EVERY 6 HOURS PRN
Qty: 120 TABLET | Refills: 3 | Status: SHIPPED | OUTPATIENT
Start: 2025-07-01 | End: 2025-07-31

## 2025-07-01 RX ORDER — FUROSEMIDE 20 MG/1
20 TABLET ORAL AS NEEDED
Qty: 30 TABLET | Refills: 3 | Status: SHIPPED | OUTPATIENT
Start: 2025-07-01 | End: 2025-08-30

## 2025-07-01 RX ORDER — LEVOTHYROXINE SODIUM 125 UG/1
62.5 TABLET ORAL DAILY
Qty: 45 TABLET | Refills: 3 | Status: SHIPPED | OUTPATIENT
Start: 2025-07-01 | End: 2026-07-01

## 2025-07-01 RX ORDER — METOPROLOL SUCCINATE 50 MG/1
50 TABLET, EXTENDED RELEASE ORAL DAILY
Qty: 90 TABLET | Refills: 3 | Status: SHIPPED | OUTPATIENT
Start: 2025-07-01 | End: 2026-07-01

## 2025-07-01 RX ORDER — ATORVASTATIN CALCIUM 40 MG/1
40 TABLET, FILM COATED ORAL NIGHTLY
Qty: 90 TABLET | Refills: 3 | Status: SHIPPED | OUTPATIENT
Start: 2025-07-01 | End: 2026-07-01

## 2025-07-01 RX ORDER — DAPAGLIFLOZIN 10 MG/1
10 TABLET, FILM COATED ORAL DAILY
Qty: 90 TABLET | Refills: 3 | Status: SHIPPED | OUTPATIENT
Start: 2025-07-01 | End: 2026-07-01

## 2025-07-01 ASSESSMENT — ENCOUNTER SYMPTOMS
LOSS OF SENSATION IN FEET: 0
DEPRESSION: 1
OCCASIONAL FEELINGS OF UNSTEADINESS: 0

## 2025-07-01 ASSESSMENT — PATIENT HEALTH QUESTIONNAIRE - PHQ9
2. FEELING DOWN, DEPRESSED OR HOPELESS: SEVERAL DAYS
1. LITTLE INTEREST OR PLEASURE IN DOING THINGS: NOT AT ALL
SUM OF ALL RESPONSES TO PHQ9 QUESTIONS 1 AND 2: 1

## 2025-07-01 ASSESSMENT — PAIN SCALES - GENERAL: PAINLEVEL_OUTOF10: 0-NO PAIN

## 2025-07-01 NOTE — PATIENT INSTRUCTIONS
It was a pleasure seeing you today. Please contact myself or my team with any questions.     To reach Ofelia Black' office please call [phone]  Fax: [phone]  To schedule an office appointment [phone].        [unfilled]  1) Please bring a list of your medications to every visit.  2) Continue current medications  3) Labwork: Please have labs drawn in the next couple days to monitor your kidney function. We will call you with any abnormal results only.    4) Follow up: with Dr. Galloway on August 26th      Please take your blood pressure daily in the morning, 1.5 to 2 hours after taking your morning medications, and document the results.    Please weigh yourself daily when you wake up. If you gain 2 to 3 pounds overnight, 5 pounds over one week or experience increased swelling in your lower extremities, abdominal tightness, bloating, or shortness of breath; please take one 20 mg tablet of Furosemide and call the office.

## 2025-07-07 ENCOUNTER — PHARMACY VISIT (OUTPATIENT)
Dept: PHARMACY | Facility: CLINIC | Age: OVER 89
End: 2025-07-07
Payer: COMMERCIAL

## 2025-07-19 ENCOUNTER — PHARMACY VISIT (OUTPATIENT)
Dept: PHARMACY | Facility: CLINIC | Age: OVER 89
End: 2025-07-19
Payer: COMMERCIAL

## 2025-07-19 PROCEDURE — RXMED WILLOW AMBULATORY MEDICATION CHARGE

## 2025-07-21 ENCOUNTER — APPOINTMENT (OUTPATIENT)
Dept: CARDIOLOGY | Facility: CLINIC | Age: OVER 89
End: 2025-07-21
Payer: COMMERCIAL

## 2025-07-22 PROBLEM — H26.9 CATARACT: Status: ACTIVE | Noted: 2019-05-07

## 2025-07-22 PROBLEM — G56.00 CARPAL TUNNEL SYNDROME: Status: ACTIVE | Noted: 2025-07-22

## 2025-07-22 PROBLEM — N18.30 STAGE 3 CHRONIC KIDNEY DISEASE (MULTI): Status: ACTIVE | Noted: 2023-01-18

## 2025-07-22 PROBLEM — H40.059 OCULAR HYPERTENSION: Status: ACTIVE | Noted: 2025-07-22

## 2025-07-22 PROBLEM — R26.2 DISABILITY OF WALKING: Status: ACTIVE | Noted: 2023-01-18

## 2025-07-23 ENCOUNTER — OFFICE VISIT (OUTPATIENT)
Dept: CARDIOLOGY | Facility: CLINIC | Age: OVER 89
End: 2025-07-23
Payer: COMMERCIAL

## 2025-07-23 VITALS
WEIGHT: 169.5 LBS | SYSTOLIC BLOOD PRESSURE: 145 MMHG | HEART RATE: 67 BPM | DIASTOLIC BLOOD PRESSURE: 77 MMHG | HEIGHT: 66 IN | OXYGEN SATURATION: 99 % | BODY MASS INDEX: 27.24 KG/M2

## 2025-07-23 DIAGNOSIS — I48.91 ATRIAL FIBRILLATION, UNSPECIFIED TYPE (MULTI): Primary | ICD-10-CM

## 2025-07-23 PROCEDURE — 3078F DIAST BP <80 MM HG: CPT | Performed by: INTERNAL MEDICINE

## 2025-07-23 PROCEDURE — 1159F MED LIST DOCD IN RCRD: CPT | Performed by: INTERNAL MEDICINE

## 2025-07-23 PROCEDURE — 3077F SYST BP >= 140 MM HG: CPT | Performed by: INTERNAL MEDICINE

## 2025-07-23 PROCEDURE — 1125F AMNT PAIN NOTED PAIN PRSNT: CPT | Performed by: INTERNAL MEDICINE

## 2025-07-23 PROCEDURE — 93005 ELECTROCARDIOGRAM TRACING: CPT | Performed by: INTERNAL MEDICINE

## 2025-07-23 PROCEDURE — 99214 OFFICE O/P EST MOD 30 MIN: CPT | Performed by: INTERNAL MEDICINE

## 2025-07-23 PROCEDURE — 99212 OFFICE O/P EST SF 10 MIN: CPT

## 2025-07-23 RX ORDER — AMIODARONE HYDROCHLORIDE 400 MG/1
400 TABLET ORAL DAILY
Qty: 90 TABLET | Refills: 1 | Status: SHIPPED | OUTPATIENT
Start: 2025-07-23 | End: 2026-01-19

## 2025-07-23 ASSESSMENT — PAIN SCALES - GENERAL: PAINLEVEL_OUTOF10: 8

## 2025-07-23 ASSESSMENT — ENCOUNTER SYMPTOMS
OCCASIONAL FEELINGS OF UNSTEADINESS: 0
DEPRESSION: 1
LOSS OF SENSATION IN FEET: 1

## 2025-07-24 LAB
ATRIAL RATE: 57 BPM
P AXIS: 22 DEGREES
P OFFSET: 210 MS
P ONSET: 150 MS
PR INTERVAL: 152 MS
Q ONSET: 226 MS
QRS COUNT: 10 BEATS
QRS DURATION: 72 MS
QT INTERVAL: 420 MS
QTC CALCULATION(BAZETT): 408 MS
QTC FREDERICIA: 413 MS
R AXIS: -5 DEGREES
T AXIS: 33 DEGREES
T OFFSET: 436 MS
VENTRICULAR RATE: 57 BPM

## 2025-07-29 ENCOUNTER — TELEPHONE (OUTPATIENT)
Dept: CARDIOLOGY | Facility: HOSPITAL | Age: OVER 89
End: 2025-07-29
Payer: COMMERCIAL

## 2025-08-01 NOTE — PROGRESS NOTES
Referred by Dr. Gillombardo for No chief complaint on file.     History Of Present Illness:    Елена Perez is a 89 y.o. female with complex PMH (detailed below) presenting to outpatient cardiology clinic for routine follow up.    The patient reports to clinic in usual state of health, tolerating outpatient med regimen without difficulty and has no acute cardiovascular complaints.  Currently denies all red flag cardiovascular symptoms including no exertional chest discomfort or shortness of breath, no symptoms involving the left jaw or arm, no palpitations, presyncope, syncope, or changes in abdominal or lower extremity edema.  He is able to complete all activities of daily living without limitation. Overall cardiovascular status has remained stable, with no changes in clinical condition in the last several weeks / months.      Past Medical History:  Hypertension  dyslipidemia  Atrial fibrillation with elevated [id number]  Type 2 diabetes   peripheral artery disease   - Incidental finding left superficial femoral artery occlusion  Hypothyroidism       Review of Systems   Constitutional:  No Weight Change, No Fever, No Chills, No Night Sweats, No Fatigue, No Malaise   ENT/Mouth:  No Hearing Changes, No Ear Pain, No Nasal Congestion, No  Sinus Pain, No Hoarseness, No sore throat, No Rhinorrhea, No Swallowing  Difficulty   Eyes:  No Eye Pain, No Swelling, No Redness, No Foreign Body, No Discharge, No Vision Changes   Cardiovascular:  See HPI   Respiratory:  No Cough, No Sputum, No Wheezing, No Smoke Exposure, No Dyspnea   Gastrointestinal:  No Nausea, No Vomiting, No Diarrhea, No  Constipation, No Pain, No Heartburn, No Anorexia, No Dysphagia, No  Hematochezia, No Melena, No Flatulence, No Jaundice   Genitourinary:  No Dysmenorrhea, No DUB, No Dyspareunia, No Dysuria, No  Urinary Frequency, No Hematuria, No Urinary Incontinence, No Urgency,  No Flank Pain, No Urinary Flow Changes   Musculoskeletal:  No Arthralgias,  No Myalgias, No Joint Swelling, No  Joint Stiffness, No Back Pain, No Neck Pain, No Injury History   Skin:  No Skin Lesions, No Pruritis, No Hair Changes, No Breast/Skin Changes, No Nipple Discharge   Neuro:  No Weakness, No Numbness, No Paresthesias, No Loss of  Consciousness, No Syncope, No Dizziness, No Headache, No Coordination  Changes, No Recent Falls   Psych:  No Anxiety/Panic, No Depression, No Insomnia, No Delusions, No Rumination, No SI/HI/AH/VH, No Social Issues,  No Memory Changes, No Violence/Abuse Hx., No Eating Concerns   Heme/Lymph:  No Bruising, No Bleeding, No Transfusions History, No Lymphadenopathy   Endocrine:  No Polyuria, No Polydipsia, No Temperature Intolerance        Past Medical History:  She has a past medical history of Pain in leg, unspecified (02/15/2019).    Past Surgical History:  She has a past surgical history that includes Other surgical history (09/30/2022).      Social History:  She reports that she has been smoking cigarettes. She has never used smokeless tobacco. She reports that she does not currently use alcohol. She reports that she does not use drugs.    Family History:  Family History[1]     Allergies:  Lisinopril    Outpatient Medications:  Current Outpatient Medications   Medication Instructions    acetaminophen (TYLENOL) 325-650 mg, Every 4 hours PRN    amiodarone (PACERONE) 400 mg, oral, Daily    apixaban (ELIQUIS) 5 mg, oral, 2 times daily    atorvastatin (LIPITOR) 40 mg, oral, Nightly    Blood glucose monitoring meter Use to check blood sugar daily.    blood sugar diagnostic (Blood Glucose Test) Use to check blood sugar daily.    cholecalciferol (Vitamin D-3) 5,000 Units tablet 1 tablet, Daily    docusate sodium (COLACE) 100 mg, oral, 2 times daily    Farxiga 10 mg, oral, Daily    fluticasone (Flonase) 50 mcg/actuation nasal spray 1 spray, Daily    furosemide (LASIX) 20 mg, oral, As needed    hydrOXYzine HCL (ATARAX) 25 mg, oral, Every 6 hours PRN    levothyroxine  "(SYNTHROID, LEVOXYL) 62.5 mcg, oral, Daily, Take on an empty stomach at the same time each day, either 30 to 60 minutes prior to breakfast    spironolactone (ALDACTONE) 25 mg, oral, Daily        Last Recorded Vitals:  Vitals:    07/23/25 1551   BP: 145/77   BP Location: Left arm   Patient Position: Sitting   BP Cuff Size: Adult   Pulse: 67   SpO2: 99%   Weight: 76.9 kg (169 lb 8 oz)   Height: 1.676 m (5' 6\")       Physical Exam:  GEN: calm, cooperative, asking appropriate questions  NEURO: Alert and oriented x3, CN2-12 intact, SILT, Moving all extremities without difficulty  HEENT: atraumatic, no goiter, no JVD, normal uvula   CARDS: PMI is non-displaced, RRR, no MRG  PULM: CTAB, no wheezes / rales / rhonchi   ABD: soft, non-distended, non-tender, non-tympanitic, BS in all 4 quadrants. No hepatosplenomegaly  : unremarkable  SKIN: healthy appearing, normal skin turgor   EXT: atraumatic  VASC: b/l radial pulses are brisk and equal            Last Labs:  CBC -  Lab Results   Component Value Date    WBC 10.1 06/21/2025    HGB 13.7 06/21/2025    HCT 42.4 06/21/2025    MCV 84 06/21/2025     06/21/2025       CMP -  Lab Results   Component Value Date    CALCIUM 10.0 06/21/2025    PHOS 3.9 05/15/2025    PROT 8.3 (H) 06/21/2025    ALBUMIN 4.3 06/21/2025    AST 16 06/21/2025    ALT 9 06/21/2025    ALKPHOS 94 06/21/2025    BILITOT 0.4 06/21/2025       LIPID PANEL -   Lab Results   Component Value Date    CHOL 174 05/16/2025    HDL 38.4 05/16/2025    CHHDL 4.5 05/16/2025    VLDL 17 05/16/2025    TRIG 84 05/16/2025    NHDL 136 05/16/2025       RENAL FUNCTION PANEL -   Lab Results   Component Value Date    K 4.3 06/21/2025    PHOS 3.9 05/15/2025       Lab Results   Component Value Date    BNP 94 06/21/2025    HGBA1C 6.7 (H) 04/20/2025           Last Cardiology Tests:    ECG:  Encounter Date: 07/23/25   ECG 12 lead (Clinic Performed)   Result Value    Ventricular Rate 57    Atrial Rate 57    OH Interval 152    QRS Duration " 72    QT Interval 420    QTC Calculation(Bazett) 408    P Axis 22    R Axis -5    T Axis 33    QRS Count 10    Q Onset 226    P Onset 150    P Offset 210    T Offset 436    QTC Fredericia 413    Narrative    Sinus bradycardia  Otherwise normal ECG  When compared with ECG of 21-JUN-2025 21:03,  Sinus rhythm has replaced Atrial flutter  Criteria for Anterior infarct are no longer Present  ST no longer depressed in Inferior leads  ST elevation now present in Lateral leads  T wave inversion no longer evident in Inferior leads  T wave inversion no longer evident in Lateral leads  QT has shortened         Echo:  Echo Results:  Transthoracic Echo (TTE) Complete With Contrast And Ultromics 04/21/2025    [facility], [address] [place] [address]  Tel [phone] and Fax [phone]    TRANSTHORACIC ECHOCARDIOGRAM REPORT      Patient Name:       PEYTON SORIA SUKHWINDER       Reading Physician:    [id number] Kinga Avila MD  Study Date:         4/21/2025           Ordering Provider:    [id number] EZEQUIEL SAGE  MRN/PID:            [id number]            Fellow:  Accession#:         [id number]        Nurse:                Carrie Poole RN  Date of Birth/Age:  1934 / 89     Sonographer:          JOSE Harris RDCS  Gender assigned at  F                   Additional Staff:  Birth:  Height:             167.64 cm           Admit Date:  Weight:             78.02 kg            Admission Status:     Inpatient -  Priority  discharge  BSA / BMI:          1.88 m2 / 27.76     Encounter#:           [id number]  kg/m2  Blood Pressure:     104/52 mmHg         Department Location:  [facility]  Non Invasive    Study Type:    TRANSTHORACIC ECHO (TTE) COMPLETE  Diagnosis/ICD: Acute respiratory failure with hypoxia-J96.01  Indication:    Hypoxic respiratory failure  CPT Code:      Echo Complete w Full [id number]    Patient History:  Smoker:             Current.  Diabetes:          Yes  Pertinent History: A-Fib, HTN and Dyspnea.    Study Detail: The following Echo studies were performed: 2D, M-Mode, Doppler and  color flow. Technically challenging study due to poor acoustic  windows and A-fib. Definity used as a contrast agent for  endocardial border definition and agitated saline used as a  contrast agent for intraseptal flow evaluation. Total contrast  used for this procedure was 2 mL via IV push. Unable to obtain  suprasternal notch view.      PHYSICIAN INTERPRETATION:  Left Ventricle: Left ventricular ejection fraction is normal, by visual estimate at 60-65%. There are no regional left ventricular wall motion abnormalities. The left ventricular cavity size is normal. There is normal septal and normal posterior left ventricular wall thickness. Left ventricular diastolic filling cannot be determined due to atrial fibrillation/flutter.  Left Atrium: The left atrium is mildly dilated. A bubble study using agitated saline was performed. Bubble study is negative.  Right Ventricle: The right ventricle is normal in size. There is normal right ventricular global systolic function.  Right Atrium: The right is enlarged.  Aortic Valve: The aortic valve is trileaflet. There is mild aortic valve cusp calcification. There is no evidence of aortic valve regurgitation. The peak instantaneous gradient of the aortic valve is 6 mmHg.  Mitral Valve: The mitral valve is mildly thickened. There is trace mitral valve regurgitation.  Tricuspid Valve: The tricuspid valve is structurally normal. There is mild tricuspid regurgitation. The Doppler estimated RVSP is within normal limits at 29.8 mmHg.  Pulmonic Valve: The pulmonic valve is structurally normal. There is physiologic pulmonic valve regurgitation.  Pericardium: Trivial pericardial effusion.  Aorta: The aortic root is normal. There is no dilatation of the ascending aorta.  In comparison to the previous echocardiogram(s): There  are no prior studies on this patient for comparison purposes.      CONCLUSIONS:  1. Poorly visualized anatomical structures due to suboptimal image quality.  2. Left ventricular ejection fraction is normal, by visual estimate at 60-65%.  3. There is normal right ventricular global systolic function.  4. The left atrium is mildly dilated.  5. Right ventricular systolic pressure is within normal limits.    RECOMMENDATIONS:  Utilizing an FDA cleared automated machine learning algorithm (EchoGo Heart Failure by [employer]), the analysis of the apical 4-chamber echocardiogram suggests the presence of heart failure with preserved ejection fraction (HFpEF)*. Clinical correlation looking for additional heart failure signs and symptoms is recommended, as a definite diagnosis of heart failure cannot be made by imaging alone.  *Per ACC/AHA/HFSA universal diagnosis of heart failure, HFpEF is defined as 1) signs and symptoms leading to clinical diagnosis of heart failure, 2) an ejection fraction of at least 50%, and 3) evidence of elevated intra-cardiac filling pressures by echocardiography, BNP elevation, or catheterization.    QUANTITATIVE DATA SUMMARY:    2D MEASUREMENTS:         Normal Ranges:  IVSd:            0.80 cm (0.6-1.1cm)  LVPWd:           0.60 cm (0.6-1.1cm)  LVIDd:           4.90 cm (3.9-5.9cm)  LVIDs:           3.10 cm  LV Mass Index:   59 g/m2  LV % FS          36.7 %      LEFT ATRIUM:                  Normal Ranges:  LA Vol A4C:        57.7 ml    (22+/-6mL/m2)  LA Vol A2C:        86.5 ml  LA Vol BP:         72.2 ml  LA Vol Index A4C:  30.7ml/m2  LA Vol Index A2C:  46.1 ml/m2  LA Vol Index BP:   38.5 ml/m2  LA Area A4C:       21.0 cm2  LA Area A2C:       26.3 cm2  LA Major Axis A4C: 6.5 cm  LA Major Axis A2C: 6.8 cm      LV SYSTOLIC FUNCTION:  Normal Ranges:  EF-Visual:      63 %  LV EF Reported: 63 %      LV DIASTOLIC FUNCTION:          Normal Ranges:  MV Peak E:             0.98 m/s (0.7-1.2 m/s)      AORTIC  VALVE:           Normal Ranges:  AoV Vmax:      1.25 m/s (<=1.7m/s)  AoV Peak P.2 mmHg (<20mmHg)  LVOT Max Kvng:  0.74 m/s (<=1.1m/s)  LVOT VTI:      12.10 cm  LVOT Diameter: 2.10 cm  (1.8-2.4cm)  AoV Area,Vmax: 2.04 cm2 (2.5-4.5cm2)      TRICUSPID VALVE/RVSP:          Normal Ranges:  Peak TR Velocity:     2.59 m/s  RV Syst Pressure:     30 mmHg  (< 30mmHg)      PULMONIC VALVE:          Normal Ranges:  PV Max Kvng:     1.0 m/s  (0.6-0.9m/s)  PV Max P.0 mmHg      [id number] Kinga Avila MD  Electronically signed on 2025 at 3:38:26 PM        ** Final **       Ejection Fractions:  EF   Date/Time Value Ref Range Status   2025 10:32 AM 63 %        Cath:  No results found for this or any previous visit from the past 365 days.        Stress Test:  Stress Results:  No results found for this or any previous visit from the past 365 days.       Cardiac Imaging:  ECG 12 lead (Clinic Performed)  Sinus bradycardia  Otherwise normal ECG  When compared with ECG of 2025 21:03,  Sinus rhythm has replaced Atrial flutter  Criteria for Anterior infarct are no longer Present  ST no longer depressed in Inferior leads  ST elevation now present in Lateral leads  T wave inversion no longer evident in Inferior leads  T wave inversion no longer evident in Lateral leads  QT has shortened      Assessment/Plan   This is a clinically stable 89-year-old female here to establish care regarding her multiple cardiovascular conditions.  Improved medication compliance since our last visit, which has resulted in some improvement in her exertional dyspnea.  Plan will be to transition to amiodarone from metoprolol, with close follow up in outpatient cardiology clinic.      Please see detailed problem based assessment / plan below     # A-fib, elevated [id number]  -we will transition to amiodarone from metoprolol  -Apixaban  -Consider referral to structural heart/Dr. Kothari clinic in the future     # Dyspnea on  exertion  -Metoprolol succinate  -Referral to pulmonology  -Transthoracic echocardiogram  -Smoking cessation     # Hypertension, well-controlled  -Continue amlodipine 10 mg  -Continue hydrochlorothiazide 25 mg     # PAD/dyslipidemia/elevated CAD risk  -Continue aspirin 81 mg  -Continue atorvastatin 10 mg, we discussed the need to escalate this dose  -- Patient has elected to hold off on escalation of her lipid-lowering agents at this time, which we will discuss again at our next visit    # Cardiovascular Health Maintenance  - Physical Activity: Encourage 10-15K steps per day  - Healthy Diet: Avoid high fat and high sodium foods (processed meats / fast foods)  --- consider a mediterranean or DASH diet, emphasizing vegetables, fruits, whole grains, lean proteins  - Avoidance of smoking and smoke exposure    Problem List Items Addressed This Visit           ICD-10-CM    A-fib (Multi) - Primary I48.91    Relevant Medications    amiodarone (Pacerone) 400 mg tablet    Other Relevant Orders    ECG 12 lead (Clinic Performed) (Completed)           Time Spent: I spent 40 minutes reviewing medical testing, obtaining medical history and counselling and educating on diagnosis and documenting clinical encounter.   C. Barton Gillombardo, MD  Interventional Cardiology  Pager: [phone]       [1]   Family History  Problem Relation Name Age of Onset    Parkinsonism Mother      Liver cancer Sister      Pancreatic cancer Sister

## 2025-08-04 RX ORDER — METOPROLOL SUCCINATE 50 MG/1
50 TABLET, EXTENDED RELEASE ORAL DAILY
OUTPATIENT
Start: 2025-08-04

## 2025-08-05 ENCOUNTER — TELEPHONE (OUTPATIENT)
Dept: CARDIOLOGY | Facility: CLINIC | Age: OVER 89
End: 2025-08-05
Payer: COMMERCIAL

## 2025-08-05 DIAGNOSIS — I48.91 ATRIAL FIBRILLATION, UNSPECIFIED TYPE (MULTI): ICD-10-CM

## 2025-08-05 PROCEDURE — RXMED WILLOW AMBULATORY MEDICATION CHARGE

## 2025-08-05 RX ORDER — AMIODARONE HYDROCHLORIDE 400 MG/1
400 TABLET ORAL DAILY
Qty: 90 TABLET | Refills: 1 | Status: SHIPPED | OUTPATIENT
Start: 2025-08-05 | End: 2026-02-01

## 2025-08-06 PROCEDURE — RXMED WILLOW AMBULATORY MEDICATION CHARGE

## 2025-08-08 ENCOUNTER — PHARMACY VISIT (OUTPATIENT)
Dept: PHARMACY | Facility: CLINIC | Age: OVER 89
End: 2025-08-08
Payer: COMMERCIAL

## 2025-08-12 ENCOUNTER — TELEPHONE (OUTPATIENT)
Dept: CARDIOLOGY | Facility: CLINIC | Age: OVER 89
End: 2025-08-12
Payer: COMMERCIAL

## 2025-08-12 DIAGNOSIS — I48.0 PAROXYSMAL ATRIAL FIBRILLATION (MULTI): Primary | ICD-10-CM

## 2025-08-13 PROCEDURE — RXMED WILLOW AMBULATORY MEDICATION CHARGE

## 2025-08-13 RX ORDER — METOPROLOL SUCCINATE 50 MG/1
50 TABLET, EXTENDED RELEASE ORAL DAILY
Qty: 30 TABLET | Refills: 11 | Status: SHIPPED | OUTPATIENT
Start: 2025-08-13 | End: 2026-08-13

## 2025-08-18 ENCOUNTER — PHARMACY VISIT (OUTPATIENT)
Dept: PHARMACY | Facility: CLINIC | Age: OVER 89
End: 2025-08-18
Payer: COMMERCIAL

## 2025-08-26 ENCOUNTER — APPOINTMENT (OUTPATIENT)
Dept: CARDIOLOGY | Facility: CLINIC | Age: OVER 89
End: 2025-08-26
Payer: COMMERCIAL

## 2025-08-27 ENCOUNTER — APPOINTMENT (OUTPATIENT)
Dept: OPHTHALMOLOGY | Facility: CLINIC | Age: OVER 89
End: 2025-08-27
Payer: COMMERCIAL

## 2025-08-27 ENCOUNTER — APPOINTMENT (OUTPATIENT)
Dept: CARDIOLOGY | Facility: CLINIC | Age: OVER 89
End: 2025-08-27
Payer: COMMERCIAL

## 2025-08-27 DIAGNOSIS — H34.8110 CENTRAL RETINAL VEIN OCCLUSION WITH MACULAR EDEMA OF RIGHT EYE: Primary | ICD-10-CM

## 2025-08-27 PROCEDURE — 2023F DILAT RTA XM W/O RTNOPTHY: CPT

## 2025-08-27 PROCEDURE — 99213 OFFICE O/P EST LOW 20 MIN: CPT

## 2025-08-27 PROCEDURE — 92134 CPTRZ OPH DX IMG PST SGM RTA: CPT

## 2025-08-27 PROCEDURE — 67028 INJECTION EYE DRUG: CPT | Mod: RIGHT SIDE

## 2025-08-27 ASSESSMENT — VISUAL ACUITY
OD_SC: 20/400
METHOD: SNELLEN - LINEAR
OS_SC: 20/60

## 2025-08-27 ASSESSMENT — ENCOUNTER SYMPTOMS
RESPIRATORY NEGATIVE: 0
CARDIOVASCULAR NEGATIVE: 0
GASTROINTESTINAL NEGATIVE: 0
ENDOCRINE NEGATIVE: 0
HEMATOLOGIC/LYMPHATIC NEGATIVE: 0
EYES NEGATIVE: 1
MUSCULOSKELETAL NEGATIVE: 0
NEUROLOGICAL NEGATIVE: 0
CONSTITUTIONAL NEGATIVE: 0
ALLERGIC/IMMUNOLOGIC NEGATIVE: 0
PSYCHIATRIC NEGATIVE: 0

## 2025-08-27 ASSESSMENT — TONOMETRY
OS_IOP_MMHG: 18
OD_IOP_MMHG: 18
IOP_METHOD: TONOPEN

## 2025-08-27 ASSESSMENT — EXTERNAL EXAM - RIGHT EYE: OD_EXAM: NORMAL

## 2025-08-27 ASSESSMENT — EXTERNAL EXAM - LEFT EYE: OS_EXAM: NORMAL

## 2025-08-27 ASSESSMENT — SLIT LAMP EXAM - LIDS
COMMENTS: DERMATOCHALASIS, MGD
COMMENTS: DERMATOCHALASIS, MGD

## 2025-08-27 ASSESSMENT — CUP TO DISC RATIO
OS_RATIO: .3
OD_RATIO: .3

## 2025-09-02 ENCOUNTER — APPOINTMENT (OUTPATIENT)
Dept: CARDIOLOGY | Facility: CLINIC | Age: OVER 89
End: 2025-09-02
Payer: COMMERCIAL

## 2025-10-01 ENCOUNTER — APPOINTMENT (OUTPATIENT)
Dept: OPHTHALMOLOGY | Facility: CLINIC | Age: OVER 89
End: 2025-10-01
Payer: COMMERCIAL

## 2025-12-12 ENCOUNTER — APPOINTMENT (OUTPATIENT)
Dept: PRIMARY CARE | Facility: CLINIC | Age: OVER 89
End: 2025-12-12
Payer: COMMERCIAL